# Patient Record
Sex: MALE | Race: WHITE | NOT HISPANIC OR LATINO | URBAN - METROPOLITAN AREA
[De-identification: names, ages, dates, MRNs, and addresses within clinical notes are randomized per-mention and may not be internally consistent; named-entity substitution may affect disease eponyms.]

---

## 2017-01-28 ENCOUNTER — INPATIENT (INPATIENT)
Facility: HOSPITAL | Age: 72
LOS: 5 days | Discharge: HOME | End: 2017-02-03
Payer: COMMERCIAL

## 2017-01-28 ENCOUNTER — OUTPATIENT (OUTPATIENT)
Dept: OUTPATIENT SERVICES | Facility: HOSPITAL | Age: 72
LOS: 1 days | End: 2017-01-28

## 2017-01-28 PROCEDURE — 74177 CT ABD & PELVIS W/CONTRAST: CPT | Mod: 26

## 2017-01-28 PROCEDURE — 71010: CPT | Mod: 26

## 2017-01-28 PROCEDURE — 52332 CYSTOSCOPY AND TREATMENT: CPT | Mod: LT

## 2017-01-28 PROCEDURE — 70450 CT HEAD/BRAIN W/O DYE: CPT | Mod: 26

## 2017-01-28 PROCEDURE — 71275 CT ANGIOGRAPHY CHEST: CPT | Mod: 26

## 2017-01-28 PROCEDURE — 99291 CRITICAL CARE FIRST HOUR: CPT

## 2017-01-29 ENCOUNTER — OUTPATIENT (OUTPATIENT)
Dept: OUTPATIENT SERVICES | Facility: HOSPITAL | Age: 72
LOS: 1 days | End: 2017-01-29

## 2017-01-29 PROCEDURE — 71010: CPT | Mod: 26

## 2017-01-30 ENCOUNTER — OUTPATIENT (OUTPATIENT)
Dept: OUTPATIENT SERVICES | Facility: HOSPITAL | Age: 72
LOS: 1 days | End: 2017-01-30

## 2017-01-30 PROCEDURE — 71010: CPT | Mod: 26

## 2017-01-31 PROCEDURE — 71010: CPT | Mod: 26

## 2017-02-01 ENCOUNTER — OUTPATIENT (OUTPATIENT)
Dept: OUTPATIENT SERVICES | Facility: HOSPITAL | Age: 72
LOS: 1 days | End: 2017-02-01

## 2017-02-01 PROCEDURE — 76857 US EXAM PELVIC LIMITED: CPT | Mod: 26

## 2017-02-02 ENCOUNTER — OUTPATIENT (OUTPATIENT)
Dept: OUTPATIENT SERVICES | Facility: HOSPITAL | Age: 72
LOS: 1 days | End: 2017-02-02

## 2017-02-03 ENCOUNTER — EMERGENCY (EMERGENCY)
Facility: HOSPITAL | Age: 72
LOS: 1 days | End: 2017-02-03
Payer: COMMERCIAL

## 2017-02-03 PROCEDURE — 99283 EMERGENCY DEPT VISIT LOW MDM: CPT

## 2017-02-06 ENCOUNTER — APPOINTMENT (OUTPATIENT)
Dept: UROLOGY | Facility: CLINIC | Age: 72
End: 2017-02-06

## 2017-02-06 DIAGNOSIS — N20.0 CALCULUS OF KIDNEY: ICD-10-CM

## 2017-02-07 ENCOUNTER — INPATIENT (INPATIENT)
Facility: HOSPITAL | Age: 72
LOS: 3 days | Discharge: HOME | End: 2017-02-11
Payer: COMMERCIAL

## 2017-02-07 ENCOUNTER — OUTPATIENT (OUTPATIENT)
Dept: OUTPATIENT SERVICES | Facility: HOSPITAL | Age: 72
LOS: 1 days | End: 2017-02-07

## 2017-02-07 PROCEDURE — 74176 CT ABD & PELVIS W/O CONTRAST: CPT | Mod: 26

## 2017-02-07 PROCEDURE — 99285 EMERGENCY DEPT VISIT HI MDM: CPT | Mod: 25

## 2017-02-08 ENCOUNTER — OUTPATIENT (OUTPATIENT)
Dept: OUTPATIENT SERVICES | Facility: HOSPITAL | Age: 72
LOS: 1 days | End: 2017-02-08

## 2017-02-09 ENCOUNTER — OUTPATIENT (OUTPATIENT)
Dept: OUTPATIENT SERVICES | Facility: HOSPITAL | Age: 72
LOS: 1 days | End: 2017-02-09

## 2017-02-10 ENCOUNTER — OUTPATIENT (OUTPATIENT)
Dept: OUTPATIENT SERVICES | Facility: HOSPITAL | Age: 72
LOS: 1 days | End: 2017-02-10

## 2017-02-11 ENCOUNTER — OUTPATIENT (OUTPATIENT)
Dept: OUTPATIENT SERVICES | Facility: HOSPITAL | Age: 72
LOS: 1 days | End: 2017-02-11

## 2017-02-11 PROCEDURE — 99284 EMERGENCY DEPT VISIT MOD MDM: CPT

## 2017-02-11 PROCEDURE — 71010: CPT | Mod: 26

## 2017-02-12 ENCOUNTER — OUTPATIENT (OUTPATIENT)
Dept: OUTPATIENT SERVICES | Facility: HOSPITAL | Age: 72
LOS: 1 days | End: 2017-02-12

## 2017-02-12 ENCOUNTER — INPATIENT (INPATIENT)
Facility: HOSPITAL | Age: 72
LOS: 2 days | Discharge: HOME HEALTH | End: 2017-02-15
Payer: MEDICARE

## 2017-02-12 PROCEDURE — 99285 EMERGENCY DEPT VISIT HI MDM: CPT

## 2017-02-13 ENCOUNTER — OUTPATIENT (OUTPATIENT)
Dept: OUTPATIENT SERVICES | Facility: HOSPITAL | Age: 72
LOS: 1 days | End: 2017-02-13

## 2017-02-13 PROCEDURE — 99232 SBSQ HOSP IP/OBS MODERATE 35: CPT

## 2017-02-14 ENCOUNTER — OUTPATIENT (OUTPATIENT)
Dept: OUTPATIENT SERVICES | Facility: HOSPITAL | Age: 72
LOS: 1 days | End: 2017-02-14

## 2017-02-14 PROCEDURE — 99232 SBSQ HOSP IP/OBS MODERATE 35: CPT

## 2017-02-14 PROCEDURE — 36569 INSJ PICC 5 YR+ W/O IMAGING: CPT

## 2017-02-14 PROCEDURE — 76937 US GUIDE VASCULAR ACCESS: CPT | Mod: 26

## 2017-02-15 ENCOUNTER — OUTPATIENT (OUTPATIENT)
Dept: OUTPATIENT SERVICES | Facility: HOSPITAL | Age: 72
LOS: 1 days | End: 2017-02-15

## 2017-02-15 PROCEDURE — 93306 TTE W/DOPPLER COMPLETE: CPT | Mod: 26

## 2017-02-15 PROCEDURE — 99232 SBSQ HOSP IP/OBS MODERATE 35: CPT

## 2017-02-16 ENCOUNTER — RX RENEWAL (OUTPATIENT)
Age: 72
End: 2017-02-16

## 2017-02-17 ENCOUNTER — MEDICATION RENEWAL (OUTPATIENT)
Age: 72
End: 2017-02-17

## 2017-02-24 ENCOUNTER — EMERGENCY (EMERGENCY)
Facility: HOSPITAL | Age: 72
LOS: 1 days | End: 2017-02-24
Payer: COMMERCIAL

## 2017-02-24 PROCEDURE — 99283 EMERGENCY DEPT VISIT LOW MDM: CPT

## 2017-03-06 PROCEDURE — 74176 CT ABD & PELVIS W/O CONTRAST: CPT | Mod: 26

## 2017-03-06 PROCEDURE — 99285 EMERGENCY DEPT VISIT HI MDM: CPT

## 2017-03-07 ENCOUNTER — INPATIENT (INPATIENT)
Facility: HOSPITAL | Age: 72
LOS: 0 days | Discharge: HOME | End: 2017-03-07
Payer: MEDICARE

## 2017-03-10 ENCOUNTER — APPOINTMENT (OUTPATIENT)
Dept: CARDIOLOGY | Facility: CLINIC | Age: 72
End: 2017-03-10

## 2017-03-31 ENCOUNTER — APPOINTMENT (OUTPATIENT)
Dept: UROLOGY | Facility: HOSPITAL | Age: 72
End: 2017-03-31

## 2017-03-31 ENCOUNTER — OUTPATIENT (OUTPATIENT)
Dept: OUTPATIENT SERVICES | Facility: HOSPITAL | Age: 72
LOS: 1 days | End: 2017-03-31

## 2017-04-07 ENCOUNTER — APPOINTMENT (OUTPATIENT)
Dept: CARDIOLOGY | Facility: CLINIC | Age: 72
End: 2017-04-07

## 2017-04-10 ENCOUNTER — APPOINTMENT (OUTPATIENT)
Dept: UROLOGY | Facility: CLINIC | Age: 72
End: 2017-04-10

## 2017-05-02 ENCOUNTER — APPOINTMENT (OUTPATIENT)
Dept: CARDIOLOGY | Facility: CLINIC | Age: 72
End: 2017-05-02

## 2017-05-11 ENCOUNTER — APPOINTMENT (OUTPATIENT)
Dept: CARDIOLOGY | Facility: CLINIC | Age: 72
End: 2017-05-11

## 2017-05-17 ENCOUNTER — APPOINTMENT (OUTPATIENT)
Dept: CARDIOLOGY | Facility: CLINIC | Age: 72
End: 2017-05-17

## 2017-05-25 ENCOUNTER — APPOINTMENT (OUTPATIENT)
Dept: CARDIOLOGY | Facility: CLINIC | Age: 72
End: 2017-05-25

## 2017-05-31 ENCOUNTER — APPOINTMENT (OUTPATIENT)
Dept: CARDIOLOGY | Facility: CLINIC | Age: 72
End: 2017-05-31

## 2017-10-19 ENCOUNTER — RX RENEWAL (OUTPATIENT)
Age: 72
End: 2017-10-19

## 2017-11-20 ENCOUNTER — RX RENEWAL (OUTPATIENT)
Age: 72
End: 2017-11-20

## 2018-01-17 ENCOUNTER — APPOINTMENT (OUTPATIENT)
Dept: CARDIOLOGY | Facility: CLINIC | Age: 73
End: 2018-01-17
Payer: COMMERCIAL

## 2018-01-17 VITALS
BODY MASS INDEX: 33.4 KG/M2 | HEIGHT: 73 IN | SYSTOLIC BLOOD PRESSURE: 144 MMHG | DIASTOLIC BLOOD PRESSURE: 96 MMHG | HEART RATE: 66 BPM | OXYGEN SATURATION: 100 % | WEIGHT: 252 LBS

## 2018-01-17 DIAGNOSIS — Z87.891 PERSONAL HISTORY OF NICOTINE DEPENDENCE: ICD-10-CM

## 2018-01-17 DIAGNOSIS — Z86.79 PERSONAL HISTORY OF OTHER DISEASES OF THE CIRCULATORY SYSTEM: ICD-10-CM

## 2018-01-17 DIAGNOSIS — Z86.39 PERSONAL HISTORY OF OTHER ENDOCRINE, NUTRITIONAL AND METABOLIC DISEASE: ICD-10-CM

## 2018-01-17 DIAGNOSIS — H90.5 UNSPECIFIED SENSORINEURAL HEARING LOSS: ICD-10-CM

## 2018-01-17 DIAGNOSIS — Z78.9 OTHER SPECIFIED HEALTH STATUS: ICD-10-CM

## 2018-01-17 DIAGNOSIS — Z87.898 PERSONAL HISTORY OF OTHER SPECIFIED CONDITIONS: ICD-10-CM

## 2018-01-17 PROCEDURE — 99214 OFFICE O/P EST MOD 30 MIN: CPT

## 2018-01-17 RX ORDER — FUROSEMIDE 40 MG/1
40 TABLET ORAL DAILY
Qty: 90 | Refills: 1 | Status: DISCONTINUED | COMMUNITY
Start: 2017-11-20 | End: 2018-01-17

## 2018-06-07 ENCOUNTER — RX RENEWAL (OUTPATIENT)
Age: 73
End: 2018-06-07

## 2018-07-12 ENCOUNTER — RX RENEWAL (OUTPATIENT)
Age: 73
End: 2018-07-12

## 2018-07-12 ENCOUNTER — APPOINTMENT (OUTPATIENT)
Dept: CARDIOLOGY | Facility: CLINIC | Age: 73
End: 2018-07-12
Payer: COMMERCIAL

## 2018-07-12 VITALS
DIASTOLIC BLOOD PRESSURE: 68 MMHG | HEIGHT: 73 IN | SYSTOLIC BLOOD PRESSURE: 130 MMHG | BODY MASS INDEX: 33.53 KG/M2 | WEIGHT: 253 LBS | HEART RATE: 65 BPM

## 2018-07-12 DIAGNOSIS — R60.0 LOCALIZED EDEMA: ICD-10-CM

## 2018-07-12 DIAGNOSIS — E78.00 PURE HYPERCHOLESTEROLEMIA, UNSPECIFIED: ICD-10-CM

## 2018-07-12 PROCEDURE — 99214 OFFICE O/P EST MOD 30 MIN: CPT

## 2018-07-13 PROBLEM — E78.00 PURE HYPERCHOLESTEROLEMIA: Status: ACTIVE | Noted: 2018-01-17

## 2018-07-13 PROBLEM — R60.0 EDEMA EXTREMITIES: Status: ACTIVE | Noted: 2018-01-17

## 2018-07-13 RX ORDER — FUROSEMIDE 40 MG/1
40 TABLET ORAL
Qty: 90 | Refills: 3 | Status: DISCONTINUED | COMMUNITY
Start: 2018-07-12 | End: 2018-07-13

## 2018-07-22 PROBLEM — Z78.9 ALCOHOL USE: Status: ACTIVE | Noted: 2018-01-16

## 2018-12-14 ENCOUNTER — RX RENEWAL (OUTPATIENT)
Age: 73
End: 2018-12-14

## 2019-01-28 ENCOUNTER — APPOINTMENT (OUTPATIENT)
Dept: CARDIOLOGY | Facility: CLINIC | Age: 74
End: 2019-01-28
Payer: COMMERCIAL

## 2019-01-28 ENCOUNTER — NON-APPOINTMENT (OUTPATIENT)
Age: 74
End: 2019-01-28

## 2019-01-28 VITALS
HEIGHT: 73 IN | WEIGHT: 250 LBS | BODY MASS INDEX: 33.13 KG/M2 | SYSTOLIC BLOOD PRESSURE: 140 MMHG | DIASTOLIC BLOOD PRESSURE: 80 MMHG | HEART RATE: 66 BPM

## 2019-01-28 PROCEDURE — 93000 ELECTROCARDIOGRAM COMPLETE: CPT

## 2019-01-28 PROCEDURE — 99214 OFFICE O/P EST MOD 30 MIN: CPT

## 2019-02-25 ENCOUNTER — APPOINTMENT (OUTPATIENT)
Dept: CARDIOLOGY | Facility: CLINIC | Age: 74
End: 2019-02-25
Payer: MEDICARE

## 2019-02-25 PROCEDURE — 78452 HT MUSCLE IMAGE SPECT MULT: CPT

## 2019-02-25 PROCEDURE — A9502: CPT

## 2019-02-25 PROCEDURE — 93306 TTE W/DOPPLER COMPLETE: CPT

## 2019-02-25 PROCEDURE — 93015 CV STRESS TEST SUPVJ I&R: CPT

## 2019-03-11 ENCOUNTER — APPOINTMENT (OUTPATIENT)
Dept: CARDIOLOGY | Facility: CLINIC | Age: 74
End: 2019-03-11
Payer: MEDICARE

## 2019-03-11 VITALS
WEIGHT: 240 LBS | SYSTOLIC BLOOD PRESSURE: 144 MMHG | OXYGEN SATURATION: 96 % | DIASTOLIC BLOOD PRESSURE: 74 MMHG | HEIGHT: 73 IN | BODY MASS INDEX: 31.81 KG/M2 | HEART RATE: 77 BPM

## 2019-03-11 PROCEDURE — 99214 OFFICE O/P EST MOD 30 MIN: CPT

## 2019-03-11 RX ORDER — LISINOPRIL 2.5 MG/1
2.5 TABLET ORAL
Qty: 30 | Refills: 11 | Status: DISCONTINUED | COMMUNITY
Start: 2019-01-28 | End: 2019-03-11

## 2019-03-26 ENCOUNTER — OUTPATIENT (OUTPATIENT)
Dept: OUTPATIENT SERVICES | Facility: HOSPITAL | Age: 74
LOS: 1 days | End: 2019-03-26
Payer: MEDICARE

## 2019-03-26 PROCEDURE — 93459 L HRT ART/GRFT ANGIO: CPT | Mod: 26

## 2019-03-26 PROCEDURE — 93010 ELECTROCARDIOGRAM REPORT: CPT

## 2019-03-28 ENCOUNTER — TRANSCRIPTION ENCOUNTER (OUTPATIENT)
Age: 74
End: 2019-03-28

## 2019-03-28 ENCOUNTER — APPOINTMENT (OUTPATIENT)
Dept: CARDIOLOGY | Facility: CLINIC | Age: 74
End: 2019-03-28
Payer: MEDICARE

## 2019-03-28 VITALS
DIASTOLIC BLOOD PRESSURE: 62 MMHG | WEIGHT: 240 LBS | SYSTOLIC BLOOD PRESSURE: 118 MMHG | HEART RATE: 63 BPM | BODY MASS INDEX: 31.81 KG/M2 | HEIGHT: 73 IN | OXYGEN SATURATION: 98 %

## 2019-03-28 PROCEDURE — 99214 OFFICE O/P EST MOD 30 MIN: CPT

## 2019-04-11 ENCOUNTER — OUTPATIENT (OUTPATIENT)
Dept: OUTPATIENT SERVICES | Facility: HOSPITAL | Age: 74
LOS: 1 days | Discharge: HOME | End: 2019-04-11

## 2019-06-24 ENCOUNTER — APPOINTMENT (OUTPATIENT)
Dept: CARDIOLOGY | Facility: CLINIC | Age: 74
End: 2019-06-24

## 2019-07-12 ENCOUNTER — MEDICATION RENEWAL (OUTPATIENT)
Age: 74
End: 2019-07-12

## 2019-08-12 ENCOUNTER — APPOINTMENT (OUTPATIENT)
Dept: CARDIOLOGY | Facility: CLINIC | Age: 74
End: 2019-08-12

## 2019-10-07 ENCOUNTER — FORM ENCOUNTER (OUTPATIENT)
Age: 74
End: 2019-10-07

## 2019-10-07 ENCOUNTER — APPOINTMENT (OUTPATIENT)
Dept: CARDIOLOGY | Facility: CLINIC | Age: 74
End: 2019-10-07
Payer: MEDICARE

## 2019-10-07 VITALS
DIASTOLIC BLOOD PRESSURE: 80 MMHG | HEART RATE: 64 BPM | OXYGEN SATURATION: 98 % | SYSTOLIC BLOOD PRESSURE: 138 MMHG | HEIGHT: 73 IN | BODY MASS INDEX: 32.47 KG/M2 | WEIGHT: 245 LBS

## 2019-10-07 PROCEDURE — 99214 OFFICE O/P EST MOD 30 MIN: CPT

## 2019-10-07 RX ORDER — OMEPRAZOLE 20 MG/1
20 CAPSULE, DELAYED RELEASE ORAL
Qty: 30 | Refills: 0 | Status: DISCONTINUED | COMMUNITY
Start: 2016-09-09 | End: 2019-10-07

## 2019-10-08 ENCOUNTER — APPOINTMENT (OUTPATIENT)
Dept: ULTRASOUND IMAGING | Facility: CLINIC | Age: 74
End: 2019-10-08
Payer: MEDICARE

## 2019-10-08 PROCEDURE — 93970 EXTREMITY STUDY: CPT

## 2019-11-01 ENCOUNTER — APPOINTMENT (OUTPATIENT)
Dept: RADIOLOGY | Facility: CLINIC | Age: 74
End: 2019-11-01
Payer: MEDICARE

## 2019-11-01 PROCEDURE — 71046 X-RAY EXAM CHEST 2 VIEWS: CPT

## 2019-12-06 ENCOUNTER — OUTPATIENT (OUTPATIENT)
Dept: OUTPATIENT SERVICES | Facility: HOSPITAL | Age: 74
LOS: 1 days | End: 2019-12-06

## 2019-12-06 ENCOUNTER — INPATIENT (INPATIENT)
Facility: HOSPITAL | Age: 74
LOS: 0 days | Discharge: LEFT BEFORE DISCHARGE | End: 2019-12-07
Attending: FAMILY MEDICINE | Admitting: FAMILY MEDICINE
Payer: MEDICARE

## 2019-12-06 PROCEDURE — 71046 X-RAY EXAM CHEST 2 VIEWS: CPT | Mod: 26

## 2019-12-06 PROCEDURE — 99285 EMERGENCY DEPT VISIT HI MDM: CPT

## 2019-12-06 PROCEDURE — 76770 US EXAM ABDO BACK WALL COMP: CPT | Mod: 26

## 2019-12-07 ENCOUNTER — OUTPATIENT (OUTPATIENT)
Dept: OUTPATIENT SERVICES | Facility: HOSPITAL | Age: 74
LOS: 1 days | End: 2019-12-07

## 2019-12-07 PROCEDURE — 99223 1ST HOSP IP/OBS HIGH 75: CPT

## 2020-02-28 ENCOUNTER — NON-APPOINTMENT (OUTPATIENT)
Age: 75
End: 2020-02-28

## 2020-02-28 ENCOUNTER — APPOINTMENT (OUTPATIENT)
Dept: CARDIOLOGY | Facility: CLINIC | Age: 75
End: 2020-02-28
Payer: MEDICARE

## 2020-02-28 VITALS
SYSTOLIC BLOOD PRESSURE: 120 MMHG | WEIGHT: 252 LBS | BODY MASS INDEX: 33.4 KG/M2 | OXYGEN SATURATION: 98 % | HEART RATE: 100 BPM | DIASTOLIC BLOOD PRESSURE: 68 MMHG | HEIGHT: 73 IN

## 2020-02-28 PROCEDURE — 99214 OFFICE O/P EST MOD 30 MIN: CPT

## 2020-02-28 PROCEDURE — 93000 ELECTROCARDIOGRAM COMPLETE: CPT

## 2020-03-03 ENCOUNTER — APPOINTMENT (OUTPATIENT)
Dept: CARDIOLOGY | Facility: CLINIC | Age: 75
End: 2020-03-03
Payer: MEDICARE

## 2020-03-03 PROCEDURE — 93306 TTE W/DOPPLER COMPLETE: CPT

## 2020-03-07 ENCOUNTER — APPOINTMENT (OUTPATIENT)
Dept: CT IMAGING | Facility: CLINIC | Age: 75
End: 2020-03-07
Payer: MEDICARE

## 2020-03-07 PROCEDURE — 71250 CT THORAX DX C-: CPT

## 2020-03-16 ENCOUNTER — APPOINTMENT (OUTPATIENT)
Dept: CARDIOLOGY | Facility: CLINIC | Age: 75
End: 2020-03-16

## 2020-04-17 ENCOUNTER — APPOINTMENT (OUTPATIENT)
Dept: CARDIOLOGY | Facility: CLINIC | Age: 75
End: 2020-04-17

## 2020-05-29 DIAGNOSIS — Z01.818 ENCOUNTER FOR OTHER PREPROCEDURAL EXAMINATION: ICD-10-CM

## 2020-05-30 ENCOUNTER — APPOINTMENT (OUTPATIENT)
Dept: DISASTER EMERGENCY | Facility: CLINIC | Age: 75
End: 2020-05-30

## 2020-05-31 LAB — SARS-COV-2 N GENE NPH QL NAA+PROBE: NOT DETECTED

## 2020-06-01 ENCOUNTER — OUTPATIENT (OUTPATIENT)
Dept: OUTPATIENT SERVICES | Facility: HOSPITAL | Age: 75
LOS: 1 days | End: 2020-06-01
Payer: MEDICARE

## 2020-06-01 PROCEDURE — 92960 CARDIOVERSION ELECTRIC EXT: CPT

## 2020-06-01 PROCEDURE — 93312 ECHO TRANSESOPHAGEAL: CPT | Mod: 26

## 2020-06-01 PROCEDURE — 93320 DOPPLER ECHO COMPLETE: CPT | Mod: 26

## 2020-06-03 ENCOUNTER — APPOINTMENT (OUTPATIENT)
Dept: UROLOGY | Facility: CLINIC | Age: 75
End: 2020-06-03

## 2020-06-11 ENCOUNTER — APPOINTMENT (OUTPATIENT)
Dept: UROLOGY | Facility: CLINIC | Age: 75
End: 2020-06-11
Payer: MEDICARE

## 2020-06-11 PROCEDURE — 99204 OFFICE O/P NEW MOD 45 MIN: CPT | Mod: 95

## 2020-06-11 NOTE — HISTORY OF PRESENT ILLNESS
[Home] : at home, [unfilled] , at the time of the visit. [Medical Office: ([facility])___] : at the medical office located in  [Verbal consent obtained from patient] : the patient, [unfilled] [FreeTextEntry1] : 74y/o man\par Chart reviewed, labs reviewed, latest RANDY report reviewed.\par Hx of retention after surgical procedures.\par Hx of kidney stones, ureteral stone causing sepsis in 2017.-- that stone already treated and ureteral stent removed per patient.\par BPH managed by Dr. Edouard for many year. Now on Cardura and Finasteride. \par Prostate is about 150cc as patient can recall.\par He was referred by Dr. Ye Edouard for HOLEP.\par Records requested from Dr. Edouard by me today.\par Pt has researched and read about surgery online and is ready to proceed if he is a candidate.\par He has a trip planned for 2 weeks in August 2020 and hopes to have surgery before that.\par \par \par Discussed Laser enucleation of prostate with morcellation (HOLEP/ThuLEP).\par \par Indications, options including chronic Love catheter, suprapubic catheter, intermittent self-catheterization, transurethral resection of prostate, transurethral vaporization of prostate with laser or electrocautery, open or laparoscopic enucleation of the prostate, all discussed.\par \par Potential complications of transurethral holmium or thulium laser enucleation of prostate with morcellation discussed. This included risk of infection, bleeding, transfusion, conversion to open enucleation, need for staged procedure, adjacent organ injury, bladder injury, clot retention of urine requiring return to operating room for cystoscopy clot evacuation, prolonged duration of Love catheterization, urethral stricture, transient or permanent urinary incontinence, retrograde ejaculation is a permanent and irreversible complication, redo or staged surgery due to equipment malfunction, anesthetic complications, cardiac complications, all discussed. \par \par Printed information including of the above and other more uncommon complications was mailed to patient.\par \par We'll schedule.\par \par ADDENDUM:\par Records from Dr. Edouard reviewed: \par 169cc prostate by ultrasound measurement March 2018\par PSA 1.93 (April 2020)\par

## 2020-06-12 ENCOUNTER — NON-APPOINTMENT (OUTPATIENT)
Age: 75
End: 2020-06-12

## 2020-06-12 ENCOUNTER — APPOINTMENT (OUTPATIENT)
Dept: CARDIOLOGY | Facility: CLINIC | Age: 75
End: 2020-06-12
Payer: MEDICARE

## 2020-06-12 VITALS
DIASTOLIC BLOOD PRESSURE: 76 MMHG | BODY MASS INDEX: 32.46 KG/M2 | TEMPERATURE: 98 F | WEIGHT: 246 LBS | HEART RATE: 96 BPM | OXYGEN SATURATION: 96 % | SYSTOLIC BLOOD PRESSURE: 128 MMHG

## 2020-06-12 PROCEDURE — 93000 ELECTROCARDIOGRAM COMPLETE: CPT

## 2020-06-12 PROCEDURE — 99214 OFFICE O/P EST MOD 30 MIN: CPT

## 2020-06-26 ENCOUNTER — APPOINTMENT (OUTPATIENT)
Dept: UROLOGY | Facility: CLINIC | Age: 75
End: 2020-06-26
Payer: MEDICARE

## 2020-06-26 VITALS
RESPIRATION RATE: 16 BRPM | BODY MASS INDEX: 32.6 KG/M2 | OXYGEN SATURATION: 97 % | HEIGHT: 73 IN | DIASTOLIC BLOOD PRESSURE: 88 MMHG | HEART RATE: 64 BPM | TEMPERATURE: 98.3 F | SYSTOLIC BLOOD PRESSURE: 140 MMHG | WEIGHT: 246 LBS

## 2020-06-26 DIAGNOSIS — R33.9 RETENTION OF URINE, UNSPECIFIED: ICD-10-CM

## 2020-06-26 PROCEDURE — 99214 OFFICE O/P EST MOD 30 MIN: CPT

## 2020-06-26 NOTE — HISTORY OF PRESENT ILLNESS
[FreeTextEntry1] : Pt here with wife to discuss further before proceeding with surgery.\par We discussed his concerns regarding clot retention given his prior experience when he had a stent and Love placed for sepsis.\par \par we reviewed Laser enucleation of prostate procedure, technique, expected hospital stay (overnight), possibly longer if hematuria does not subside enough and if concern for clot retention. \par We reviewed pertinent procedure images and video.\par \par All questions addressed.\par \par Pt had cardioversion on 5/29 and Cardiologist would like him to stay on Eliquis for full 30 days before interrupting. We will postpone surgery to comply with recommendation from his Cardiologist.

## 2020-06-27 ENCOUNTER — APPOINTMENT (OUTPATIENT)
Dept: DISASTER EMERGENCY | Facility: CLINIC | Age: 75
End: 2020-06-27

## 2020-07-06 DIAGNOSIS — Z01.818 ENCOUNTER FOR OTHER PREPROCEDURAL EXAMINATION: ICD-10-CM

## 2020-07-07 ENCOUNTER — APPOINTMENT (OUTPATIENT)
Dept: DISASTER EMERGENCY | Facility: CLINIC | Age: 75
End: 2020-07-07

## 2020-07-08 ENCOUNTER — TRANSCRIPTION ENCOUNTER (OUTPATIENT)
Age: 75
End: 2020-07-08

## 2020-07-08 ENCOUNTER — OUTPATIENT (OUTPATIENT)
Dept: OUTPATIENT SERVICES | Facility: HOSPITAL | Age: 75
LOS: 1 days | End: 2020-07-08

## 2020-07-08 LAB — SARS-COV-2 N GENE NPH QL NAA+PROBE: NOT DETECTED

## 2020-07-09 ENCOUNTER — INPATIENT (INPATIENT)
Facility: HOSPITAL | Age: 75
LOS: 0 days | Discharge: HOME | DRG: 714 | End: 2020-07-10
Attending: UROLOGY | Admitting: UROLOGY
Payer: MEDICARE

## 2020-07-09 ENCOUNTER — RESULT REVIEW (OUTPATIENT)
Age: 75
End: 2020-07-09

## 2020-07-09 ENCOUNTER — APPOINTMENT (OUTPATIENT)
Dept: UROLOGY | Facility: HOSPITAL | Age: 75
End: 2020-07-09

## 2020-07-09 VITALS
SYSTOLIC BLOOD PRESSURE: 122 MMHG | HEIGHT: 73 IN | OXYGEN SATURATION: 98 % | TEMPERATURE: 98 F | RESPIRATION RATE: 16 BRPM | DIASTOLIC BLOOD PRESSURE: 82 MMHG | HEART RATE: 94 BPM | WEIGHT: 242.07 LBS

## 2020-07-09 DIAGNOSIS — Z01.818 ENCOUNTER FOR OTHER PREPROCEDURAL EXAMINATION: ICD-10-CM

## 2020-07-09 DIAGNOSIS — N40.1 BENIGN PROSTATIC HYPERPLASIA WITH LOWER URINARY TRACT SYMPTOMS: ICD-10-CM

## 2020-07-09 LAB
ANION GAP SERPL CALC-SCNC: 14 MMOL/L — SIGNIFICANT CHANGE UP (ref 5–17)
BLD GP AB SCN SERPL QL: NEGATIVE — SIGNIFICANT CHANGE UP
BUN SERPL-MCNC: 16 MG/DL — SIGNIFICANT CHANGE UP (ref 7–23)
CALCIUM SERPL-MCNC: 8.3 MG/DL — LOW (ref 8.4–10.5)
CHLORIDE SERPL-SCNC: 106 MMOL/L — SIGNIFICANT CHANGE UP (ref 96–108)
CO2 SERPL-SCNC: 21 MMOL/L — LOW (ref 22–31)
CREAT SERPL-MCNC: 1.16 MG/DL — SIGNIFICANT CHANGE UP (ref 0.5–1.3)
GLUCOSE SERPL-MCNC: 118 MG/DL — HIGH (ref 70–99)
HCT VFR BLD CALC: 46.3 % — SIGNIFICANT CHANGE UP (ref 39–50)
HGB BLD-MCNC: 14.6 G/DL — SIGNIFICANT CHANGE UP (ref 13–17)
MCHC RBC-ENTMCNC: 26 PG — LOW (ref 27–34)
MCHC RBC-ENTMCNC: 31.5 GM/DL — LOW (ref 32–36)
MCV RBC AUTO: 82.4 FL — SIGNIFICANT CHANGE UP (ref 80–100)
NRBC # BLD: 0 /100 WBCS — SIGNIFICANT CHANGE UP (ref 0–0)
PLATELET # BLD AUTO: 193 K/UL — SIGNIFICANT CHANGE UP (ref 150–400)
POTASSIUM SERPL-MCNC: 5 MMOL/L — SIGNIFICANT CHANGE UP (ref 3.5–5.3)
POTASSIUM SERPL-SCNC: 5 MMOL/L — SIGNIFICANT CHANGE UP (ref 3.5–5.3)
RBC # BLD: 5.62 M/UL — SIGNIFICANT CHANGE UP (ref 4.2–5.8)
RBC # FLD: 16.5 % — HIGH (ref 10.3–14.5)
RH IG SCN BLD-IMP: POSITIVE — SIGNIFICANT CHANGE UP
RH IG SCN BLD-IMP: POSITIVE — SIGNIFICANT CHANGE UP
SODIUM SERPL-SCNC: 141 MMOL/L — SIGNIFICANT CHANGE UP (ref 135–145)
WBC # BLD: 10.74 K/UL — HIGH (ref 3.8–10.5)
WBC # FLD AUTO: 10.74 K/UL — HIGH (ref 3.8–10.5)

## 2020-07-09 PROCEDURE — 88305 TISSUE EXAM BY PATHOLOGIST: CPT | Mod: 26

## 2020-07-09 PROCEDURE — 74018 RADEX ABDOMEN 1 VIEW: CPT | Mod: 26

## 2020-07-09 RX ORDER — HYDROMORPHONE HYDROCHLORIDE 2 MG/ML
0.5 INJECTION INTRAMUSCULAR; INTRAVENOUS; SUBCUTANEOUS
Refills: 0 | Status: DISCONTINUED | OUTPATIENT
Start: 2020-07-09 | End: 2020-07-09

## 2020-07-09 RX ORDER — HYDROMORPHONE HYDROCHLORIDE 2 MG/ML
1 INJECTION INTRAMUSCULAR; INTRAVENOUS; SUBCUTANEOUS
Refills: 0 | Status: DISCONTINUED | OUTPATIENT
Start: 2020-07-09 | End: 2020-07-09

## 2020-07-09 RX ORDER — PANTOPRAZOLE SODIUM 20 MG/1
40 TABLET, DELAYED RELEASE ORAL
Refills: 0 | Status: DISCONTINUED | OUTPATIENT
Start: 2020-07-09 | End: 2020-07-10

## 2020-07-09 RX ORDER — FUROSEMIDE 40 MG
10 TABLET ORAL ONCE
Refills: 0 | Status: COMPLETED | OUTPATIENT
Start: 2020-07-10 | End: 2020-07-10

## 2020-07-09 RX ORDER — DIAZEPAM 5 MG
5 TABLET ORAL ONCE
Refills: 0 | Status: DISCONTINUED | OUTPATIENT
Start: 2020-07-09 | End: 2020-07-09

## 2020-07-09 RX ORDER — FUROSEMIDE 40 MG
10 TABLET ORAL ONCE
Refills: 0 | Status: COMPLETED | OUTPATIENT
Start: 2020-07-09 | End: 2020-07-09

## 2020-07-09 RX ORDER — ATORVASTATIN CALCIUM 80 MG/1
40 TABLET, FILM COATED ORAL AT BEDTIME
Refills: 0 | Status: DISCONTINUED | OUTPATIENT
Start: 2020-07-09 | End: 2020-07-10

## 2020-07-09 RX ORDER — SODIUM CHLORIDE 9 MG/ML
1000 INJECTION, SOLUTION INTRAVENOUS
Refills: 0 | Status: DISCONTINUED | OUTPATIENT
Start: 2020-07-09 | End: 2020-07-10

## 2020-07-09 RX ORDER — ONDANSETRON 8 MG/1
4 TABLET, FILM COATED ORAL ONCE
Refills: 0 | Status: DISCONTINUED | OUTPATIENT
Start: 2020-07-09 | End: 2020-07-09

## 2020-07-09 RX ORDER — DOXAZOSIN MESYLATE 4 MG
8 TABLET ORAL
Refills: 0 | Status: DISCONTINUED | OUTPATIENT
Start: 2020-07-09 | End: 2020-07-10

## 2020-07-09 RX ORDER — METOPROLOL TARTRATE 50 MG
25 TABLET ORAL DAILY
Refills: 0 | Status: DISCONTINUED | OUTPATIENT
Start: 2020-07-09 | End: 2020-07-10

## 2020-07-09 RX ORDER — ACETAMINOPHEN 500 MG
650 TABLET ORAL EVERY 6 HOURS
Refills: 0 | Status: DISCONTINUED | OUTPATIENT
Start: 2020-07-09 | End: 2020-07-10

## 2020-07-09 RX ORDER — SENNA PLUS 8.6 MG/1
2 TABLET ORAL AT BEDTIME
Refills: 0 | Status: DISCONTINUED | OUTPATIENT
Start: 2020-07-09 | End: 2020-07-10

## 2020-07-09 RX ORDER — AMLODIPINE BESYLATE 2.5 MG/1
5 TABLET ORAL DAILY
Refills: 0 | Status: DISCONTINUED | OUTPATIENT
Start: 2020-07-09 | End: 2020-07-10

## 2020-07-09 RX ORDER — HEPARIN SODIUM 5000 [USP'U]/ML
5000 INJECTION INTRAVENOUS; SUBCUTANEOUS EVERY 8 HOURS
Refills: 0 | Status: DISCONTINUED | OUTPATIENT
Start: 2020-07-09 | End: 2020-07-10

## 2020-07-09 RX ORDER — PIPERACILLIN AND TAZOBACTAM 4; .5 G/20ML; G/20ML
3.38 INJECTION, POWDER, LYOPHILIZED, FOR SOLUTION INTRAVENOUS EVERY 8 HOURS
Refills: 0 | Status: DISCONTINUED | OUTPATIENT
Start: 2020-07-09 | End: 2020-07-10

## 2020-07-09 RX ORDER — POLYETHYLENE GLYCOL 3350 17 G/17G
17 POWDER, FOR SOLUTION ORAL DAILY
Refills: 0 | Status: DISCONTINUED | OUTPATIENT
Start: 2020-07-09 | End: 2020-07-10

## 2020-07-09 RX ORDER — FINASTERIDE 5 MG/1
5 TABLET, FILM COATED ORAL DAILY
Refills: 0 | Status: DISCONTINUED | OUTPATIENT
Start: 2020-07-09 | End: 2020-07-10

## 2020-07-09 RX ADMIN — Medication 5 MILLIGRAM(S): at 23:03

## 2020-07-09 RX ADMIN — PIPERACILLIN AND TAZOBACTAM 25 GRAM(S): 4; .5 INJECTION, POWDER, LYOPHILIZED, FOR SOLUTION INTRAVENOUS at 21:15

## 2020-07-09 RX ADMIN — Medication 8 MILLIGRAM(S): at 17:26

## 2020-07-09 RX ADMIN — Medication 10 MILLIGRAM(S): at 16:00

## 2020-07-09 RX ADMIN — ATORVASTATIN CALCIUM 40 MILLIGRAM(S): 80 TABLET, FILM COATED ORAL at 21:15

## 2020-07-09 RX ADMIN — SENNA PLUS 2 TABLET(S): 8.6 TABLET ORAL at 21:15

## 2020-07-09 RX ADMIN — SODIUM CHLORIDE 100 MILLILITER(S): 9 INJECTION, SOLUTION INTRAVENOUS at 17:54

## 2020-07-09 RX ADMIN — PANTOPRAZOLE SODIUM 40 MILLIGRAM(S): 20 TABLET, DELAYED RELEASE ORAL at 23:03

## 2020-07-09 RX ADMIN — HEPARIN SODIUM 5000 UNIT(S): 5000 INJECTION INTRAVENOUS; SUBCUTANEOUS at 21:15

## 2020-07-09 RX ADMIN — Medication 650 MILLIGRAM(S): at 21:16

## 2020-07-09 NOTE — CHART NOTE - NSCHARTNOTEFT_GEN_A_CORE
Called by RN because pt with complaint of 8/10 pain.  Pt seen and evaluated at the bedside.  Upon entering the room, pt appeared comfortable and was on cell phone without any appearance of acute distress.  Pt states he is feeling a little crampy pain in his lower abdomen below his belly button.  On exam: mild TTP of suprapubic region, 3-way bear secured and draining clear peach urine on CBI.  Discomfort most likely 2/2 bladder spasms. Already received Tylenol which appears to have helped pt a little.   Will try 5mg of Valium PO and reassess for pain. Called by RN because pt with complaint of 8/10 pain.  Pt seen and evaluated at the bedside.  Upon entering the room, pt appeared comfortable and was on cell phone without any appearance of acute distress.  Pt states he is feeling a little crampy pain in his lower abdomen below his belly button.  On exam: mild TTP of suprapubic region, 3-way bear secured and draining clear peach urine on CBI. Flowing freely without concern for obstruction.  Discomfort most likely 2/2 bladder spasms. Already received Tylenol which appears to have helped pt a little.   Will try 5mg of Valium PO and reassess for pain.

## 2020-07-09 NOTE — PRE-ANESTHESIA EVALUATION ADULT - NSANTHPMHFT_GEN_ALL_CORE
75M hx of HTN, HLD, obesity, CAD s/p CABG in 2012, afib (previously on eliquis) s/p DCCV 6/1/20. ECHO from 6/1/20 showing EF 50%. Able to lay flat. Able to walk 2-3 flights of stairs w/o SOB.

## 2020-07-09 NOTE — PROGRESS NOTE ADULT - SUBJECTIVE AND OBJECTIVE BOX
Post op Check    Pt seen and examined without complaints. Pain is controlled. Denies SOB/CP/N/V.     Vital Signs Last 24 Hrs  T(C): 36.3 (09 Jul 2020 16:45), Max: 36.8 (09 Jul 2020 08:24)  T(F): 97.3 (09 Jul 2020 16:45), Max: 98.2 (09 Jul 2020 08:24)  HR: 103 (09 Jul 2020 17:30) (94 - 113)  BP: 132/72 (09 Jul 2020 17:30) (122/82 - 152/81)  BP(mean): 97 (09 Jul 2020 17:30) (97 - 108)  RR: 14 (09 Jul 2020 17:30) (14 - 16)  SpO2: 99% (09 Jul 2020 17:30) (96% - 100%)    I&O's Summary    CBI    Physical Exam  Gen: NAD, A&Ox3  Pulm: No respiratory distress, no subcostal retractions  CV: RRR, no JVD  Abd: Soft, NT, ND  : +bear draining clear on moderate rate CBI                           14.6   10.74 )-----------( 193      ( 09 Jul 2020 16:25 )             46.3       07-09    141  |  106  |  16  ----------------------------<  118<H>  5.0   |  21<L>  |  1.16    Ca    8.3<L>      09 Jul 2020 16:25        KUB- stent in place

## 2020-07-09 NOTE — H&P PST ADULT - NSICDXPASTMEDICALHX_GEN_ALL_CORE_FT
PAST MEDICAL HISTORY:  Afib s/p carsioversion May 2020    BPH with urinary obstruction     CAD in native artery s/p cabg    HLD (hyperlipidemia)     HTN (hypertension)     Obesity     Urolithiasis

## 2020-07-09 NOTE — PRE-ANESTHESIA EVALUATION ADULT - NSANTHPEFT_GEN_ALL_CORE
General: well appearing, appears stated age  Cardiovascular: RRR, no murmurs appreciated  Respiratory: CTA B/L

## 2020-07-09 NOTE — PROGRESS NOTE ADULT - ASSESSMENT
A/P: 75y Male s/p hoLEP and resection over right UO s/p right ureteral stent placement , on CBI  DVT prophylaxis/OOB  Incentive spirometry  CBI  clamp in am   tov in am if stays clear off CBI   Analgesia and antiemetics as needed  regular Diet  AM labs  am lasix

## 2020-07-09 NOTE — H&P PST ADULT - HISTORY OF PRESENT ILLNESS
76 yo male with bph with obstruction here fore laser enucleation of prostate.   pst done in [place] 7/7/2020  recent cardioversion with eliquis on hold

## 2020-07-09 NOTE — H&P PST ADULT - ASSESSMENT
74 yo male for laser enucleation of the prostate today   type and screen to be sent   eliquis on hold after recent cardioversion

## 2020-07-09 NOTE — BRIEF OPERATIVE NOTE - NSICDXBRIEFPROCEDURE_GEN_ALL_CORE_FT
PROCEDURES:  Cystoscopy with ablation of prostate using holmium laser 09-Jul-2020 14:51:52  Rubén Green

## 2020-07-10 ENCOUNTER — TRANSCRIPTION ENCOUNTER (OUTPATIENT)
Age: 75
End: 2020-07-10

## 2020-07-10 VITALS
OXYGEN SATURATION: 95 % | RESPIRATION RATE: 18 BRPM | HEART RATE: 79 BPM | SYSTOLIC BLOOD PRESSURE: 136 MMHG | TEMPERATURE: 98 F | DIASTOLIC BLOOD PRESSURE: 92 MMHG

## 2020-07-10 LAB
ANION GAP SERPL CALC-SCNC: 12 MMOL/L — SIGNIFICANT CHANGE UP (ref 5–17)
BUN SERPL-MCNC: 20 MG/DL — SIGNIFICANT CHANGE UP (ref 7–23)
CALCIUM SERPL-MCNC: 8.1 MG/DL — LOW (ref 8.4–10.5)
CHLORIDE SERPL-SCNC: 105 MMOL/L — SIGNIFICANT CHANGE UP (ref 96–108)
CO2 SERPL-SCNC: 22 MMOL/L — SIGNIFICANT CHANGE UP (ref 22–31)
CREAT SERPL-MCNC: 1.28 MG/DL — SIGNIFICANT CHANGE UP (ref 0.5–1.3)
GLUCOSE SERPL-MCNC: 95 MG/DL — SIGNIFICANT CHANGE UP (ref 70–99)
HCT VFR BLD CALC: 42 % — SIGNIFICANT CHANGE UP (ref 39–50)
HGB BLD-MCNC: 13.5 G/DL — SIGNIFICANT CHANGE UP (ref 13–17)
MCHC RBC-ENTMCNC: 26.1 PG — LOW (ref 27–34)
MCHC RBC-ENTMCNC: 32.1 GM/DL — SIGNIFICANT CHANGE UP (ref 32–36)
MCV RBC AUTO: 81.1 FL — SIGNIFICANT CHANGE UP (ref 80–100)
NRBC # BLD: 0 /100 WBCS — SIGNIFICANT CHANGE UP (ref 0–0)
PLATELET # BLD AUTO: 190 K/UL — SIGNIFICANT CHANGE UP (ref 150–400)
POTASSIUM SERPL-MCNC: 4.2 MMOL/L — SIGNIFICANT CHANGE UP (ref 3.5–5.3)
POTASSIUM SERPL-SCNC: 4.2 MMOL/L — SIGNIFICANT CHANGE UP (ref 3.5–5.3)
RBC # BLD: 5.18 M/UL — SIGNIFICANT CHANGE UP (ref 4.2–5.8)
RBC # FLD: 16.6 % — HIGH (ref 10.3–14.5)
SODIUM SERPL-SCNC: 139 MMOL/L — SIGNIFICANT CHANGE UP (ref 135–145)
WBC # BLD: 8.66 K/UL — SIGNIFICANT CHANGE UP (ref 3.8–10.5)
WBC # FLD AUTO: 8.66 K/UL — SIGNIFICANT CHANGE UP (ref 3.8–10.5)

## 2020-07-10 PROCEDURE — 86901 BLOOD TYPING SEROLOGIC RH(D): CPT

## 2020-07-10 PROCEDURE — 88305 TISSUE EXAM BY PATHOLOGIST: CPT

## 2020-07-10 PROCEDURE — 80048 BASIC METABOLIC PNL TOTAL CA: CPT

## 2020-07-10 PROCEDURE — 85027 COMPLETE CBC AUTOMATED: CPT

## 2020-07-10 PROCEDURE — C2625: CPT

## 2020-07-10 PROCEDURE — 86900 BLOOD TYPING SEROLOGIC ABO: CPT

## 2020-07-10 PROCEDURE — C1782: CPT

## 2020-07-10 PROCEDURE — 74018 RADEX ABDOMEN 1 VIEW: CPT

## 2020-07-10 PROCEDURE — C1769: CPT

## 2020-07-10 PROCEDURE — 86850 RBC ANTIBODY SCREEN: CPT

## 2020-07-10 PROCEDURE — C1758: CPT

## 2020-07-10 PROCEDURE — C1889: CPT

## 2020-07-10 RX ORDER — POLYETHYLENE GLYCOL 3350 17 G/17G
17 POWDER, FOR SOLUTION ORAL
Qty: 0 | Refills: 0 | DISCHARGE
Start: 2020-07-10

## 2020-07-10 RX ORDER — DOXAZOSIN MESYLATE 4 MG
1 TABLET ORAL
Qty: 0 | Refills: 0 | DISCHARGE

## 2020-07-10 RX ORDER — SENNA PLUS 8.6 MG/1
2 TABLET ORAL
Qty: 0 | Refills: 0 | DISCHARGE
Start: 2020-07-10

## 2020-07-10 RX ADMIN — AMLODIPINE BESYLATE 5 MILLIGRAM(S): 2.5 TABLET ORAL at 06:50

## 2020-07-10 RX ADMIN — Medication 25 MILLIGRAM(S): at 06:50

## 2020-07-10 RX ADMIN — Medication 10 MILLIGRAM(S): at 06:59

## 2020-07-10 RX ADMIN — FINASTERIDE 5 MILLIGRAM(S): 5 TABLET, FILM COATED ORAL at 12:18

## 2020-07-10 RX ADMIN — HEPARIN SODIUM 5000 UNIT(S): 5000 INJECTION INTRAVENOUS; SUBCUTANEOUS at 06:50

## 2020-07-10 RX ADMIN — PIPERACILLIN AND TAZOBACTAM 25 GRAM(S): 4; .5 INJECTION, POWDER, LYOPHILIZED, FOR SOLUTION INTRAVENOUS at 06:50

## 2020-07-10 RX ADMIN — Medication 8 MILLIGRAM(S): at 06:50

## 2020-07-10 RX ADMIN — POLYETHYLENE GLYCOL 3350 17 GRAM(S): 17 POWDER, FOR SOLUTION ORAL at 12:18

## 2020-07-10 RX ADMIN — PANTOPRAZOLE SODIUM 40 MILLIGRAM(S): 20 TABLET, DELAYED RELEASE ORAL at 06:58

## 2020-07-10 NOTE — PROGRESS NOTE ADULT - ASSESSMENT
A/P: 75y Male s/p hoLEP and resection over right UO s/p right ureteral stent placement, POD1  DVT prophylaxis/OOB  Incentive spirometry  TOV  PVR  regular Diet  labs  lasix   dc home today with Cipro

## 2020-07-10 NOTE — PROGRESS NOTE ADULT - SUBJECTIVE AND OBJECTIVE BOX
UROLOGY DAILY PROGRESS NOTE:     Subjective: Patient seen and examined at bedside. Had bladder spasms overnight.       Objective:  Vital signs  T(F): , Max: 99.2 (07-10-20 @ 06:11)  HR: 105 (07-10-20 @ 06:11)  BP: 142/86 (07-10-20 @ 06:11)  SpO2: 94% (07-10-20 @ 06:11)  Wt(kg): --    I&O's Summary    09 Jul 2020 07:01  -  10 Jul 2020 06:56  --------------------------------------------------------  IN: 1260 mL / OUT: 0 mL / NET: 1260 mL        Gen: NAD  Pulm: No respiratory distress, no subcostal retractions  CV: RRR, no JVD  Abd: Soft, NT, ND  : CBI off- urine [facility]     Labs:  07-09  10.74 / 46.3  /1.16                           14.6   10.74 )-----------( 193      ( 09 Jul 2020 16:25 )             46.3     07-09    141  |  106  |  16  ----------------------------<  118<H>  5.0   |  21<L>  |  1.16    Ca    8.3<L>      09 Jul 2020 16:25      Urine Cx:

## 2020-07-10 NOTE — DISCHARGE NOTE NURSING/CASE MANAGEMENT/SOCIAL WORK - NSDCPNDISPN_GEN_ALL_CORE
Safe use, storage and disposal of opioids when prescribed/Opioids not applicable/not prescribed/Side effects of pain management treatment/Activities of daily living, including home environment that might     exacerbate pain or reduce effectiveness of the pain management plan of care as well as strategies to address these issues/Education provided on the pain management plan of care

## 2020-07-10 NOTE — DISCHARGE NOTE PROVIDER - CARE PROVIDER_API CALL
Chris Morris  UROLOGY  [address] [place], [place] [address]  Phone: [phone]  Fax: [phone]  Follow Up Time:

## 2020-07-10 NOTE — DISCHARGE NOTE NURSING/CASE MANAGEMENT/SOCIAL WORK - NSDCPNINST_GEN_ALL_CORE
instructed pt on s/s of infection, importance of maintaining hydration, and to call md for f/u appt.

## 2020-07-10 NOTE — DISCHARGE NOTE PROVIDER - NSDCCPCAREPLAN_GEN_ALL_CORE_FT
PRINCIPAL DISCHARGE DIAGNOSIS  Diagnosis: BPH with urinary obstruction  Assessment and Plan of Treatment: Please follow up with Dr. Morris in 1-2 weeks.  Call [phone] to schedule/confirm your appointment.  Call or follow up sooner with fevers, chills, nausea, vomiting, increasing pain, or with other concerns.        SECONDARY DISCHARGE DIAGNOSES  Diagnosis: Atrial fibrillation  Assessment and Plan of Treatment: Can restart Eliquis on Tuesday if urine clear PRINCIPAL DISCHARGE DIAGNOSIS  Diagnosis: BPH with urinary obstruction  Assessment and Plan of Treatment: Please follow up with Dr. Morris in 1-2 weeks.  Call [phone] to schedule/confirm your appointment.  Call or follow up sooner with fevers, chills, nausea, vomiting, increasing pain, or with other concerns.  Finish course of Cipro that you have at home.         SECONDARY DISCHARGE DIAGNOSES  Diagnosis: Atrial fibrillation  Assessment and Plan of Treatment: Can restart Eliquis on Tuesday if urine clear

## 2020-07-10 NOTE — DISCHARGE NOTE NURSING/CASE MANAGEMENT/SOCIAL WORK - PATIENT PORTAL LINK FT
You can access the FollowMyHealth Patient Portal offered by Health system by registering at the following website: [email or web address]. By joining [employer]’s FollowMyHealth portal, you will also be able to view your health information using other applications (apps) compatible with our system.

## 2020-07-10 NOTE — DISCHARGE NOTE PROVIDER - HOSPITAL COURSE
Underwent HOLEP on 7/9/20.  Post op was on CBI.  POD1 urine was light pink and his bear was removed.  He passed TOV.    He was sent home on Cipro, and instructed to restart Eliquis on Tuesday if urine is clear. At the time of discharge, the patient was hemodynamically stable, was tolerating PO diet, was voiding urine and passing stool, was ambulating, and was comfortable with adequate pain control.

## 2020-07-10 NOTE — DISCHARGE NOTE PROVIDER - NSDCMRMEDTOKEN_GEN_ALL_CORE_FT
amLODIPine 5 mg oral tablet: 1 tab(s) orally once a day  atorvastatin 40 mg oral tablet: 1 tab(s) orally once a day  finasteride 5 mg oral tablet: 1 tab(s) orally once a day  metoprolol tartrate 25 mg oral tablet: 1 tab(s) orally once a day  omeprazole 20 mg oral delayed release tablet: 1 tab(s) orally once a day  polyethylene glycol 3350 oral powder for reconstitution: 17 gram(s) orally once a day  senna oral tablet: 2 tab(s) orally once a day (at bedtime) amLODIPine 5 mg oral tablet: 1 tab(s) orally once a day  atorvastatin 40 mg oral tablet: 1 tab(s) orally once a day  Cipro 500 mg oral tablet: 1 tab(s) orally every 12 hours  finasteride 5 mg oral tablet: 1 tab(s) orally once a day  metoprolol tartrate 25 mg oral tablet: 1 tab(s) orally once a day  omeprazole 20 mg oral delayed release tablet: 1 tab(s) orally once a day  polyethylene glycol 3350 oral powder for reconstitution: 17 gram(s) orally once a day  senna oral tablet: 2 tab(s) orally once a day (at bedtime)

## 2020-07-13 LAB — SURGICAL PATHOLOGY STUDY: SIGNIFICANT CHANGE UP

## 2020-07-22 ENCOUNTER — OUTPATIENT (OUTPATIENT)
Dept: OUTPATIENT SERVICES | Facility: HOSPITAL | Age: 75
LOS: 1 days | End: 2020-07-22
Payer: MEDICARE

## 2020-07-22 ENCOUNTER — APPOINTMENT (OUTPATIENT)
Dept: UROLOGY | Facility: CLINIC | Age: 75
End: 2020-07-22
Payer: MEDICARE

## 2020-07-22 VITALS
HEART RATE: 86 BPM | DIASTOLIC BLOOD PRESSURE: 95 MMHG | BODY MASS INDEX: 32.6 KG/M2 | WEIGHT: 246 LBS | SYSTOLIC BLOOD PRESSURE: 144 MMHG | RESPIRATION RATE: 17 BRPM | HEIGHT: 73 IN

## 2020-07-22 VITALS — TEMPERATURE: 97.9 F

## 2020-07-22 DIAGNOSIS — T19.9XXA FOREIGN BODY IN GENITOURINARY TRACT, PART UNSPECIFIED, INITIAL ENCOUNTER: ICD-10-CM

## 2020-07-22 PROCEDURE — 52310 CYSTOSCOPY AND TREATMENT: CPT

## 2020-07-22 PROCEDURE — 52310 CYSTOSCOPY AND TREATMENT: CPT | Mod: 58

## 2020-07-22 PROCEDURE — 99024 POSTOP FOLLOW-UP VISIT: CPT

## 2020-07-22 NOTE — HISTORY OF PRESENT ILLNESS
[FreeTextEntry1] : s/p HOLEP and RIGHT ureteral stent insertion on 7/9/20\par path = 183 grams benign\par \par PVR =0cc\par \par Cystoscopy right stent removal today\par \par Plan\par Uroflow and PVR next visit\par PSA next visit\par f/u 3 months

## 2020-07-23 DIAGNOSIS — T19.9XXA FOREIGN BODY IN GENITOURINARY TRACT, PART UNSPECIFIED, INITIAL ENCOUNTER: ICD-10-CM

## 2020-07-23 DIAGNOSIS — N40.1 BENIGN PROSTATIC HYPERPLASIA WITH LOWER URINARY TRACT SYMPTOMS: ICD-10-CM

## 2020-08-20 ENCOUNTER — LABORATORY RESULT (OUTPATIENT)
Age: 75
End: 2020-08-20

## 2020-08-21 ENCOUNTER — TRANSCRIPTION ENCOUNTER (OUTPATIENT)
Age: 75
End: 2020-08-21

## 2020-08-21 RX ORDER — APIXABAN 5 MG/1
5 TABLET, FILM COATED ORAL
Qty: 180 | Refills: 3 | Status: DISCONTINUED | COMMUNITY
Start: 2020-05-29 | End: 2020-08-21

## 2020-08-24 LAB — BACTERIA UR CULT: NORMAL

## 2020-08-31 ENCOUNTER — NON-APPOINTMENT (OUTPATIENT)
Age: 75
End: 2020-08-31

## 2020-08-31 ENCOUNTER — APPOINTMENT (OUTPATIENT)
Dept: CARDIOLOGY | Facility: CLINIC | Age: 75
End: 2020-08-31
Payer: MEDICARE

## 2020-08-31 VITALS
TEMPERATURE: 98.2 F | SYSTOLIC BLOOD PRESSURE: 134 MMHG | DIASTOLIC BLOOD PRESSURE: 84 MMHG | OXYGEN SATURATION: 96 % | WEIGHT: 242 LBS | HEART RATE: 90 BPM | BODY MASS INDEX: 32.07 KG/M2 | HEIGHT: 73 IN

## 2020-08-31 PROBLEM — N40.1 BENIGN PROSTATIC HYPERPLASIA WITH LOWER URINARY TRACT SYMPTOMS: Chronic | Status: ACTIVE | Noted: 2020-07-09

## 2020-08-31 PROBLEM — I25.10 ATHEROSCLEROTIC HEART DISEASE OF NATIVE CORONARY ARTERY WITHOUT ANGINA PECTORIS: Chronic | Status: ACTIVE | Noted: 2020-07-09

## 2020-08-31 PROBLEM — I48.91 UNSPECIFIED ATRIAL FIBRILLATION: Chronic | Status: ACTIVE | Noted: 2020-07-09

## 2020-08-31 PROBLEM — N20.9 URINARY CALCULUS, UNSPECIFIED: Chronic | Status: ACTIVE | Noted: 2020-07-09

## 2020-08-31 PROBLEM — I10 ESSENTIAL (PRIMARY) HYPERTENSION: Chronic | Status: ACTIVE | Noted: 2020-07-09

## 2020-08-31 PROBLEM — E78.5 HYPERLIPIDEMIA, UNSPECIFIED: Chronic | Status: ACTIVE | Noted: 2020-07-09

## 2020-08-31 PROBLEM — E66.9 OBESITY, UNSPECIFIED: Chronic | Status: ACTIVE | Noted: 2020-07-09

## 2020-08-31 PROCEDURE — 93000 ELECTROCARDIOGRAM COMPLETE: CPT

## 2020-08-31 PROCEDURE — 99214 OFFICE O/P EST MOD 30 MIN: CPT

## 2020-08-31 RX ORDER — DOXAZOSIN 8 MG/1
8 TABLET ORAL
Qty: 180 | Refills: 3 | Status: DISCONTINUED | COMMUNITY
Start: 2016-02-29 | End: 2020-08-31

## 2020-08-31 RX ORDER — CIPROFLOXACIN HYDROCHLORIDE 500 MG/1
500 TABLET, FILM COATED ORAL TWICE DAILY
Qty: 10 | Refills: 0 | Status: DISCONTINUED | COMMUNITY
Start: 2020-08-19 | End: 2020-08-31

## 2020-08-31 RX ORDER — CIPROFLOXACIN HYDROCHLORIDE 500 MG/1
500 TABLET, FILM COATED ORAL
Qty: 14 | Refills: 0 | Status: DISCONTINUED | COMMUNITY
Start: 2020-07-06 | End: 2020-08-31

## 2020-08-31 RX ORDER — FINASTERIDE 5 MG/1
5 TABLET, FILM COATED ORAL DAILY
Refills: 0 | Status: DISCONTINUED | COMMUNITY
Start: 2016-08-03 | End: 2020-08-31

## 2020-09-02 ENCOUNTER — APPOINTMENT (OUTPATIENT)
Dept: ELECTROPHYSIOLOGY | Facility: CLINIC | Age: 75
End: 2020-09-02
Payer: MEDICARE

## 2020-09-02 VITALS
DIASTOLIC BLOOD PRESSURE: 84 MMHG | HEIGHT: 73 IN | BODY MASS INDEX: 32.07 KG/M2 | TEMPERATURE: 97.5 F | WEIGHT: 242 LBS | HEART RATE: 85 BPM | SYSTOLIC BLOOD PRESSURE: 130 MMHG | OXYGEN SATURATION: 99 %

## 2020-09-02 PROCEDURE — 99204 OFFICE O/P NEW MOD 45 MIN: CPT

## 2020-09-02 PROCEDURE — 93000 ELECTROCARDIOGRAM COMPLETE: CPT

## 2020-09-11 LAB
APPEARANCE: ABNORMAL
BACTERIA UR CULT: NORMAL
BACTERIA: NEGATIVE
BILIRUBIN URINE: NEGATIVE
BLOOD URINE: ABNORMAL
COLOR: YELLOW
GLUCOSE QUALITATIVE U: NEGATIVE
HYALINE CASTS: 0 /LPF
KETONES URINE: NEGATIVE
LEUKOCYTE ESTERASE URINE: NEGATIVE
MICROSCOPIC-UA: NORMAL
NITRITE URINE: NEGATIVE
PH URINE: 6
PROTEIN URINE: ABNORMAL
RED BLOOD CELLS URINE: >720 /HPF
SPECIFIC GRAVITY URINE: >=1.03
SQUAMOUS EPITHELIAL CELLS: 1 /HPF
UROBILINOGEN URINE: NORMAL
WHITE BLOOD CELLS URINE: 432 /HPF

## 2020-09-20 NOTE — PHYSICAL EXAM
[General Appearance - Well Developed] : well developed [General Appearance - In No Acute Distress] : no acute distress [Normal Conjunctiva] : the conjunctiva exhibited no abnormalities [Normal Jugular Venous V Waves Present] : normal jugular venous V waves present [Heart Sounds] : normal S1 and S2 [Arterial Pulses Normal] : the arterial pulses were normal [Edema] : no peripheral edema present [Auscultation Breath Sounds / Voice Sounds] : lungs were clear to auscultation bilaterally [Abdomen Soft] : soft [Abdomen Tenderness] : non-tender [Nail Clubbing] : no clubbing of the fingernails [Cyanosis, Localized] : no localized cyanosis [] : no rash [No Venous Stasis] : no venous stasis [Impaired Insight] : insight and judgment were intact

## 2020-09-20 NOTE — HISTORY OF PRESENT ILLNESS
[FreeTextEntry1] : Patient is a 75-year-old man seen in evaluation for atrial fibrillation.  He has a prior history of hypertension treated with amlodipine.  He status post coronary artery bypass surgery in 2012.  Previous echo had showed preserved left ventricular function.  The patient presented with increased shortness of breath and dizziness with routine activities.  He was previously able to swim for laps but was get an increase in symptoms of shortness of breath.  He had an EKG done at his primary care physician office that had revealed atrial fibrillation.  He underwent RANDY cardioversion but had recurrent atrial fibrillation fairly quickly thereafter.  He was not aware whether he felt better while in sinus rhythm.  The patient was on metoprolol 50 mg/day.  In addition he was on Eliquis which was discontinued because of laser prostate procedure 8 weeks ago and he had bleeding.  Plan was to resume his Eliquis which he will do shortly.\par \par \par Echocardiogram from 3/3/2020 showed ejection fraction 45 to 50%, left atrial diameter 6.1 cm 1 left atrial volume index 45 cc/m², mild to moderate mitral regurgitation, mild concentric left ventricular hypertrophy, moderate diastolic dysfunction, no pericardial effusion and mild pulmonary hypertension.  The septal thickness was 1.2 cm.\par \par RANDY performed 6/1/2020 at time of cardioversion did not show any evidence of intracardiac clot.  His ejection fraction then was read as 50%.  The mitral regurgitation was noted as mild.\par \par Cardiac catheterization performed 3/26/2019 showed patent LIMA graft to the LAD, saphenous vein graft to the first diagonal was occluded, saphenous vein graft to the second obtuse marginal had luminal irregularities.  The graft to the PDA was patent.\par \par Nuclear stress test performed February 2019 had shown no evidence of ischemia by EKG but small to medium-sized defect in lateral wall that was reversible suggestive of ischemia.  There was an inferoapical scar ejection fraction was noted 45%.\par \par \par need test for sleep apnea test\par \par does not know if he wants an ablation\par Interested in another CV - but may need AAD\par Will get a sleep study first.\par \par Electrocardiogram from 8/31/2020 showed atrial fibrillation with right bundle branch block/left anterior hemiblock.  Possible anterior septal MI.

## 2020-09-20 NOTE — DISCUSSION/SUMMARY
[FreeTextEntry1] : Patient has prior coronary disease and status post coronary artery bypass surgery.  By echo his ejection fraction is 45 to 50%.  His left atrium diameter is 6.1 cm but volume index 45 cc/m².  He has mild mitral regurgitation.  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  He has persistent atrial fibrillation with symptoms of shortness of breath.  The patient would benefit from restoration of sinus rhythm especially given his symptoms, increased ventricular response and mild cardiomyopathy.  He had a relapse soon after the cardioversion.\par \par Options for maintenance of sinus rhythm discussed with the patient.  This would include antiarrhythmic agent versus catheter ablation procedure.  Because of his underlying conduction disease we would favor catheter ablation procedure. The AF ablation procedure, risk, benefits and alternatives fully discussed. Success rate, recurrence rate, redo rates all discussed\par Complications including perforation, vascular/valvular injuries, lung injury, esophageal injury, bleeding requiring transfusion, mortality discussed. \par He does not want ablation procedure this time but will consider the options presented.  We also discussed lifestyle modification and recommended he get a sleep study.  Patient was instructed to see me in follow-up after the sleep study.  In the meantime I recommended he continue on anticoagulation and rate control with higher doses of beta-blocker if needed.

## 2020-09-20 NOTE — REVIEW OF SYSTEMS
[Feeling Fatigued] : feeling fatigued [see HPI] : see HPI [Urinary Frequency] : urinary frequency [Fever] : no fever [Recent Weight Gain (___ Lbs)] : no recent weight gain [Blurry Vision] : no blurred vision [Sore Throat] : no sore throat [Cough] : no cough [Abdominal Pain] : no abdominal pain [Skin: A Rash] : no rash: [Dizziness] : no dizziness [Anxiety] : no anxiety [Easy Bruising] : no tendency for easy bruising

## 2020-10-03 ENCOUNTER — OUTPATIENT (OUTPATIENT)
Dept: OUTPATIENT SERVICES | Facility: HOSPITAL | Age: 75
LOS: 1 days | End: 2020-10-03
Payer: MEDICARE

## 2020-10-03 DIAGNOSIS — G47.33 OBSTRUCTIVE SLEEP APNEA (ADULT) (PEDIATRIC): ICD-10-CM

## 2020-10-03 PROCEDURE — 95806 SLEEP STUDY UNATT&RESP EFFT: CPT | Mod: 26

## 2020-10-03 PROCEDURE — 95806 SLEEP STUDY UNATT&RESP EFFT: CPT

## 2020-10-03 PROCEDURE — G0399: CPT

## 2020-10-14 ENCOUNTER — APPOINTMENT (OUTPATIENT)
Dept: UROLOGY | Facility: CLINIC | Age: 75
End: 2020-10-14
Payer: MEDICARE

## 2020-10-14 VITALS
BODY MASS INDEX: 32.07 KG/M2 | WEIGHT: 242 LBS | SYSTOLIC BLOOD PRESSURE: 141 MMHG | HEIGHT: 73 IN | RESPIRATION RATE: 17 BRPM | DIASTOLIC BLOOD PRESSURE: 95 MMHG | HEART RATE: 82 BPM

## 2020-10-14 VITALS — TEMPERATURE: 97.9 F

## 2020-10-14 DIAGNOSIS — N13.8 BENIGN PROSTATIC HYPERPLASIA WITH LOWER URINARY TRACT SYMPMS: ICD-10-CM

## 2020-10-14 DIAGNOSIS — N40.1 BENIGN PROSTATIC HYPERPLASIA WITH LOWER URINARY TRACT SYMPMS: ICD-10-CM

## 2020-10-14 DIAGNOSIS — R31.0 GROSS HEMATURIA: ICD-10-CM

## 2020-10-14 DIAGNOSIS — R35.0 FREQUENCY OF MICTURITION: ICD-10-CM

## 2020-10-14 PROCEDURE — 99214 OFFICE O/P EST MOD 30 MIN: CPT

## 2020-10-14 NOTE — PHYSICAL EXAM
[General Appearance - Well Developed] : well developed [Abdomen Soft] : soft [Skin Color & Pigmentation] : normal skin color and pigmentation [Heart Rate And Rhythm] : Heart rate and rhythm were normal [] : no respiratory distress [Oriented To Time, Place, And Person] : oriented to person, place, and time [Normal Station and Gait] : the gait and station were normal for the patient's age [No Focal Deficits] : no focal deficits [Urinary Bladder Findings] : the bladder was normal on palpation

## 2020-10-15 PROBLEM — R31.0 GROSS HEMATURIA: Status: ACTIVE | Noted: 2020-08-14

## 2020-10-15 LAB — PSA SERPL-MCNC: 0.15 NG/ML

## 2020-10-15 NOTE — ASSESSMENT
[FreeTextEntry1] : PVR is 39 ml\par \par Plan\par 1) restart Finasteride 5 mg for xarelto associated hematuria\par 2) PSA today, urine culture today\par 3) increase oxybutynin from 10 mg to 15mg \par 3) RTC in 6 months with Uroflow and PVR

## 2020-10-15 NOTE — HISTORY OF PRESENT ILLNESS
[Nocturia] : nocturia [Hematuria - Gross] : gross hematuria [None] : None [Dysuria] : no dysuria [FreeTextEntry1] : 75 yr old male presents to follow up for BPH. \par Good strong urine flow since surgery.\par Pt complains of urgency, taking oxybutynin 10 mg ER, feels like it is helping. \par Pt doing kegel exercises, but not always compliant.\par Nocturia x 3. \par Pt stopped Xarelto, urine was clear for 2-3 weeks, restarted Xarelto and urine became bloody within 30 mins. He has since discontinued the Xarelto for now.\par \par HOLEP and RIGHT ureteral stent insertion on 7/9/20\par path = 183 grams benign \par Right ureteral stent removed on 7/22/20

## 2020-10-16 LAB — BACTERIA UR CULT: NORMAL

## 2020-11-19 ENCOUNTER — APPOINTMENT (OUTPATIENT)
Dept: PULMONOLOGY | Facility: CLINIC | Age: 75
End: 2020-11-19
Payer: MEDICARE

## 2020-11-19 VITALS
SYSTOLIC BLOOD PRESSURE: 140 MMHG | DIASTOLIC BLOOD PRESSURE: 88 MMHG | OXYGEN SATURATION: 97 % | HEART RATE: 158 BPM | HEIGHT: 73 IN | WEIGHT: 240 LBS | BODY MASS INDEX: 31.81 KG/M2 | RESPIRATION RATE: 16 BRPM

## 2020-11-19 PROCEDURE — 99204 OFFICE O/P NEW MOD 45 MIN: CPT

## 2020-11-19 NOTE — PHYSICAL EXAM
[No Acute Distress] : no acute distress [Elongated Uvula] : elongated uvula [Enlarged Base of the Tongue] : enlarged base of the tongue [II] : Mallampati Class: II [Normal Appearance] : normal appearance [Neck Circumference: ___] : neck circumference: [unfilled] [No Neck Mass] : no neck mass [Normal Rate/Rhythm] : normal rate/rhythm [Normal S1, S2] : normal s1, s2 [No Murmurs] : no murmurs [No Resp Distress] : no resp distress [Clear to Auscultation Bilaterally] : clear to auscultation bilaterally [No Abnormalities] : no abnormalities [Benign] : benign [Normal Gait] : normal gait [No Clubbing] : no clubbing [No Cyanosis] : no cyanosis [No Edema] : no edema [Normal Color/ Pigmentation] : normal color/ pigmentation [No Focal Deficits] : no focal deficits [Oriented x3] : oriented x3 [Normal Affect] : normal affect [TextBox_2] : obese

## 2020-11-19 NOTE — CONSULT LETTER
[Dear  ___] : Dear  [unfilled], [Consult Letter:] : I had the pleasure of evaluating your patient, [unfilled]. [Please see my note below.] : Please see my note below. [Consult Closing:] : Thank you very much for allowing me to participate in the care of this patient.  If you have any questions, please do not hesitate to contact me. [Sincerely,] : Sincerely, [DrRema  ___] : Dr. LARRY

## 2020-11-19 NOTE — HISTORY OF PRESENT ILLNESS
[TextBox_4] : Here to review home sleep test ordered by Dr Wakefield (electrophysiology)\par Afib post failed DCCV on Xarelto and considering ablation\par Sleep onset insomnia - reads from 10-12 then falls asleep rapidly\par Nocturia resolved post procedure for BPH [Daytime Somnolence] : daytime somnolence [Nonrestorative Sleep] : nonrestorative sleep [Snoring] : snoring [Tired while Driving] : tired while driving [TextBox_77] : 12 [TextBox_79] : 7 [TextBox_81] : 5 [TextBox_89] : 1 [ESS] : 12

## 2020-11-19 NOTE — ASSESSMENT
[FreeTextEntry1] : Obesity \par Atrial fibrillation - failed DCCV\par At least moderate ZAY - likely severe given poor sleep the night of the Home Sleep Test

## 2020-11-23 ENCOUNTER — RX RENEWAL (OUTPATIENT)
Age: 75
End: 2020-11-23

## 2020-12-04 ENCOUNTER — NON-APPOINTMENT (OUTPATIENT)
Age: 75
End: 2020-12-04

## 2021-01-11 ENCOUNTER — APPOINTMENT (OUTPATIENT)
Dept: CARDIOLOGY | Facility: CLINIC | Age: 76
End: 2021-01-11
Payer: MEDICARE

## 2021-01-11 ENCOUNTER — NON-APPOINTMENT (OUTPATIENT)
Age: 76
End: 2021-01-11

## 2021-01-11 VITALS
OXYGEN SATURATION: 98 % | TEMPERATURE: 97.3 F | DIASTOLIC BLOOD PRESSURE: 80 MMHG | HEART RATE: 79 BPM | HEIGHT: 73 IN | WEIGHT: 235 LBS | SYSTOLIC BLOOD PRESSURE: 136 MMHG | BODY MASS INDEX: 31.14 KG/M2

## 2021-01-11 PROCEDURE — 99214 OFFICE O/P EST MOD 30 MIN: CPT

## 2021-01-11 PROCEDURE — 93000 ELECTROCARDIOGRAM COMPLETE: CPT

## 2021-03-17 ENCOUNTER — NON-APPOINTMENT (OUTPATIENT)
Age: 76
End: 2021-03-17

## 2021-03-17 ENCOUNTER — APPOINTMENT (OUTPATIENT)
Dept: ELECTROPHYSIOLOGY | Facility: CLINIC | Age: 76
End: 2021-03-17
Payer: MEDICARE

## 2021-03-17 VITALS
OXYGEN SATURATION: 98 % | SYSTOLIC BLOOD PRESSURE: 130 MMHG | HEART RATE: 76 BPM | DIASTOLIC BLOOD PRESSURE: 80 MMHG | BODY MASS INDEX: 31.54 KG/M2 | TEMPERATURE: 97.3 F | HEIGHT: 73 IN | WEIGHT: 238 LBS

## 2021-03-17 DIAGNOSIS — E66.9 OBESITY, UNSPECIFIED: ICD-10-CM

## 2021-03-17 PROCEDURE — 99213 OFFICE O/P EST LOW 20 MIN: CPT

## 2021-03-17 PROCEDURE — 93000 ELECTROCARDIOGRAM COMPLETE: CPT

## 2021-03-17 RX ORDER — ASPIRIN ENTERIC COATED TABLETS 81 MG 81 MG/1
81 TABLET, DELAYED RELEASE ORAL DAILY
Refills: 3 | Status: DISCONTINUED | COMMUNITY
End: 2021-03-17

## 2021-03-17 NOTE — PHYSICAL EXAM
[General Appearance - Well Developed] : well developed [General Appearance - In No Acute Distress] : no acute distress [Normal Conjunctiva] : the conjunctiva exhibited no abnormalities [Normal Jugular Venous V Waves Present] : normal jugular venous V waves present [Auscultation Breath Sounds / Voice Sounds] : lungs were clear to auscultation bilaterally [Heart Sounds] : normal S1 and S2 [Arterial Pulses Normal] : the arterial pulses were normal [Edema] : no peripheral edema present [Abdomen Soft] : soft [Abdomen Tenderness] : non-tender [Nail Clubbing] : no clubbing of the fingernails [Cyanosis, Localized] : no localized cyanosis [] : no rash [No Venous Stasis] : no venous stasis [Impaired Insight] : insight and judgment were intact

## 2021-03-28 PROBLEM — E66.9 OBESITY (BMI 30-39.9): Status: ACTIVE | Noted: 2020-11-19

## 2021-03-28 NOTE — HISTORY OF PRESENT ILLNESS
[FreeTextEntry1] : Follow-up evaluation for 76-year-old man who is persistent atrial fibrillation.\par \par Because of his symptoms and mild myopathy it was recommended on his last visit to consider rhythm control option.  Catheter ablation was discussed with the patient did not want procedures done.\par \par Since then has had a sleep study that was abnormal and CPAP was recommended.\par \par Other history noted for  hypertension treated with amlodipine.  He status post coronary artery bypass surgery in 2012.  Previous echo had showed preserved left ventricular function. \par \par Symptoms: Patient had initially presented with increased shortness of breath and dizziness with routine activities.  He was previously able to swim for laps but was get an increase in symptoms of shortness of breath.  He had an EKG done at his primary care physician office that had revealed atrial fibrillation.  He underwent RANDY cardioversion but had recurrent atrial fibrillation fairly quickly thereafter.  He was not aware whether he felt better while in sinus rhythm.  The patient was on metoprolol 50 mg/day.  In addition he was on Eliquis which was discontinued because of laser prostate procedure 8 weeks ago and he had bleeding.  Eliquis was resumed later.\par \par \par Echocardiogram from 3/3/2020 showed ejection fraction 45 to 50%, left atrial diameter 6.1 cm 1 left atrial volume index 45 cc/m², mild to moderate mitral regurgitation, mild concentric left ventricular hypertrophy, moderate diastolic dysfunction, no pericardial effusion and mild pulmonary hypertension.  The septal thickness was 1.2 cm.\par \par RANDY performed 6/1/2020 at time of cardioversion did not show any evidence of intracardiac clot.  His ejection fraction then was read as 50%.  The mitral regurgitation was noted as mild.\par \par Cardiac catheterization performed 3/26/2019 showed patent LIMA graft to the LAD, saphenous vein graft to the first diagonal was occluded, saphenous vein graft to the second obtuse marginal had luminal irregularities.  The graft to the PDA was patent.\par \par Nuclear stress test performed February 2019 had shown no evidence of ischemia by EKG but small to medium-sized defect in lateral wall that was reversible suggestive of ischemia.  There was an inferoapical scar ejection fraction was noted 45%.\par \par Electrocardiogram ordered to assess rhythm, intervals, QT interval.  It atrial fibrillation with right bundle branch block/left anterior hemiblock.

## 2021-03-28 NOTE — REVIEW OF SYSTEMS
[Feeling Fatigued] : feeling fatigued [see HPI] : see HPI [Fever] : no fever [Recent Weight Gain (___ Lbs)] : no recent weight gain [Blurry Vision] : no blurred vision [Sore Throat] : no sore throat [Cough] : no cough [Abdominal Pain] : no abdominal pain [Urinary Frequency] : no change in urinary frequency [Skin: A Rash] : no rash: [Dizziness] : no dizziness [Anxiety] : no anxiety [Easy Bruising] : no tendency for easy bruising

## 2021-03-28 NOTE — DISCUSSION/SUMMARY
[FreeTextEntry1] : \par Push and record symptoms\par Need echo to reassess LA size. \par \par Patient has prior coronary disease and status post coronary artery bypass surgery.  By echo his ejection fraction is 45 to 50%.  His left atrium diameter is 6.1 cm but volume index 45 cc/m².  He has mild mitral regurgitation.  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  He has persistent atrial fibrillation with symptoms of shortness of breath.  The patient would benefit from restoration of sinus rhythm especially given his symptoms, increased ventricular response and mild cardiomyopathy.  He had a relapse soon after the cardioversion.\par \par Options for maintenance of sinus rhythm discussed with the patient.  This would include antiarrhythmic agent versus catheter ablation procedure.  Because of his underlying conduction disease we would favor catheter ablation procedure. The AF ablation procedure, risk, benefits and alternatives re discussed. Success rate, recurrence rate, redo rates all discussed.\par Complications including perforation, vascular/valvular injuries, lung injury, esophageal injury, bleeding requiring transfusion, mortality discussed.\par \par He is considering the options and will reassess himself by indulging in more exercise and  see how he feels.  If he is very symptomatic the patient would then consider ablation.  He  will contact  us  thereafter if he develops symptoms during exercise.

## 2021-03-29 ENCOUNTER — RX RENEWAL (OUTPATIENT)
Age: 76
End: 2021-03-29

## 2021-03-31 ENCOUNTER — RX RENEWAL (OUTPATIENT)
Age: 76
End: 2021-03-31

## 2021-04-14 ENCOUNTER — APPOINTMENT (OUTPATIENT)
Dept: UROLOGY | Facility: CLINIC | Age: 76
End: 2021-04-14

## 2021-05-03 ENCOUNTER — APPOINTMENT (OUTPATIENT)
Dept: ELECTROPHYSIOLOGY | Facility: CLINIC | Age: 76
End: 2021-05-03

## 2021-05-03 ENCOUNTER — APPOINTMENT (OUTPATIENT)
Dept: CARDIOLOGY | Facility: CLINIC | Age: 76
End: 2021-05-03

## 2021-06-23 ENCOUNTER — APPOINTMENT (OUTPATIENT)
Dept: CARDIOLOGY | Facility: CLINIC | Age: 76
End: 2021-06-23
Payer: MEDICARE

## 2021-06-23 ENCOUNTER — NON-APPOINTMENT (OUTPATIENT)
Age: 76
End: 2021-06-23

## 2021-06-23 ENCOUNTER — APPOINTMENT (OUTPATIENT)
Dept: ELECTROPHYSIOLOGY | Facility: CLINIC | Age: 76
End: 2021-06-23
Payer: MEDICARE

## 2021-06-23 VITALS
HEART RATE: 86 BPM | SYSTOLIC BLOOD PRESSURE: 138 MMHG | HEIGHT: 73 IN | BODY MASS INDEX: 32.6 KG/M2 | DIASTOLIC BLOOD PRESSURE: 90 MMHG | WEIGHT: 246 LBS | TEMPERATURE: 97.3 F | OXYGEN SATURATION: 98 %

## 2021-06-23 PROCEDURE — 93306 TTE W/DOPPLER COMPLETE: CPT

## 2021-06-23 PROCEDURE — 99214 OFFICE O/P EST MOD 30 MIN: CPT

## 2021-06-23 PROCEDURE — 93000 ELECTROCARDIOGRAM COMPLETE: CPT

## 2021-06-23 RX ORDER — OXYBUTYNIN CHLORIDE 15 MG/1
15 TABLET, EXTENDED RELEASE ORAL
Qty: 90 | Refills: 2 | Status: DISCONTINUED | COMMUNITY
Start: 2020-09-11 | End: 2021-06-23

## 2021-06-23 NOTE — REVIEW OF SYSTEMS
[Feeling Fatigued] : not feeling fatigued [Blurry Vision] : no blurred vision [Sore Throat] : no sore throat [SOB] : no shortness of breath [Dyspnea on exertion] : not dyspnea during exertion [Cough] : no cough [Abdominal Pain] : no abdominal pain [Rash] : no rash [Dizziness] : no dizziness [Easy Bleeding] : no tendency for easy bleeding

## 2021-06-23 NOTE — DISCUSSION/SUMMARY
[FreeTextEntry1] : \par The patient has asymptomatic atrial fibrillation.  He is reluctant to undergo catheter ablation procedure because he is feeling well.  He has some baseline conduction disease with a right bundle branch block.  His repeat echocardiogram today shows EF 45 to 50%.  There was some improvement in his left atrial diameter.  We rediscussed ablation option but again because he is feeling well he would prefer not to have it done.  \par The plan is the patient will push himself over the next 3 months and return for follow-up discussion regarding whether he wants to have an ablation or antiarrhythmic and RANDY cardioversion.\par \par In the meantime we will continue him on his current medications which include metoprolol as well as Xarelto.

## 2021-06-23 NOTE — CARDIOLOGY SUMMARY
[de-identified] : Atrial fibrillation \par -Right bundle branch block with left axis -bifascicular block. \par  -Old anteroseptal infarct.

## 2021-06-23 NOTE — HISTORY OF PRESENT ILLNESS
[FreeTextEntry1] : Follow-up evaluation for 76-year-old man who is persistent atrial fibrillation.\par \par He has been feeling well and denies any symptoms.  Patient is very active and is not aware of the A. fib.\par \par He had an echocardiogram today that showed EF 45 to 50%, left atrial diameter 5.3 cm, mild mitral regurgitation, mild aortic stenosis.  Left atrial volume index was 48 cc/m².  There is borderline elevation in pulmonary pressure.  No pericardial effusion.  Compared to the echo from March 2020 there are no significant changes. Catheter ablation was previously discussed with the patient did not want procedures done.\par \par He had an sleep study that was abnormal and CPAP was recommended.\par \par Other history noted for  hypertension treated with amlodipine.  He status post coronary artery bypass surgery in 2012.  Previous echo had showed preserved left ventricular function. \par \par Symptoms: Patient currently has no symptoms.  Previously: Patient had initially presented with increased shortness of breath and dizziness with routine activities.  He was previously able to swim for laps but was get an increase in symptoms of shortness of breath.  He had an EKG done at his primary care physician office that had revealed atrial fibrillation.  He underwent RANDY cardioversion but had recurrent atrial fibrillation fairly quickly thereafter.  He was not aware whether he felt better while in sinus rhythm.  The patient was on metoprolol 50 mg/day.  In addition he was on Eliquis which was discontinued because of laser prostate procedure 8 weeks ago and he had bleeding.  Eliquis was resumed later.\par \par \par Echocardiogram from 3/3/2020 showed ejection fraction 45 to 50%, left atrial diameter 6.1 cm 1 left atrial volume index 45 cc/m², mild to moderate mitral regurgitation, mild concentric left ventricular hypertrophy, moderate diastolic dysfunction, no pericardial effusion and mild pulmonary hypertension.  The septal thickness was 1.2 cm.\par \par RANDY performed 6/1/2020 at time of cardioversion did not show any evidence of intracardiac clot.  His ejection fraction then was read as 50%.  The mitral regurgitation was noted as mild.\par \par Cardiac catheterization performed 3/26/2019 showed patent LIMA graft to the LAD, saphenous vein graft to the first diagonal was occluded, saphenous vein graft to the second obtuse marginal had luminal irregularities.  The graft to the PDA was patent.\par \par Nuclear stress test performed February 2019 had shown no evidence of ischemia by EKG but small to medium-sized defect in lateral wall that was reversible suggestive of ischemia.  There was an inferoapical scar ejection fraction was noted 45%.\par \par Electrocardiogram ordered to assess rhythm, intervals, QT interval.  It atrial fibrillation with right bundle branch block/left anterior hemiblock.

## 2021-06-25 ENCOUNTER — RX RENEWAL (OUTPATIENT)
Age: 76
End: 2021-06-25

## 2021-07-12 ENCOUNTER — APPOINTMENT (OUTPATIENT)
Dept: CARDIOLOGY | Facility: CLINIC | Age: 76
End: 2021-07-12

## 2021-10-04 ENCOUNTER — RX RENEWAL (OUTPATIENT)
Age: 76
End: 2021-10-04

## 2021-11-02 ENCOUNTER — NON-APPOINTMENT (OUTPATIENT)
Age: 76
End: 2021-11-02

## 2021-11-02 ENCOUNTER — APPOINTMENT (OUTPATIENT)
Dept: CARDIOLOGY | Facility: CLINIC | Age: 76
End: 2021-11-02
Payer: MEDICARE

## 2021-11-02 VITALS
HEART RATE: 91 BPM | TEMPERATURE: 97 F | DIASTOLIC BLOOD PRESSURE: 86 MMHG | WEIGHT: 250 LBS | HEIGHT: 73 IN | OXYGEN SATURATION: 96 % | BODY MASS INDEX: 33.13 KG/M2 | SYSTOLIC BLOOD PRESSURE: 124 MMHG

## 2021-11-02 PROCEDURE — 93000 ELECTROCARDIOGRAM COMPLETE: CPT

## 2021-11-02 PROCEDURE — 99214 OFFICE O/P EST MOD 30 MIN: CPT

## 2021-11-02 RX ORDER — OMEPRAZOLE 40 MG/1
40 CAPSULE, DELAYED RELEASE ORAL TWICE DAILY
Refills: 0 | Status: DISCONTINUED | COMMUNITY
End: 2021-11-02

## 2021-11-02 RX ORDER — FINASTERIDE 5 MG/1
5 TABLET, FILM COATED ORAL
Qty: 90 | Refills: 3 | Status: DISCONTINUED | COMMUNITY
Start: 2020-10-14 | End: 2021-11-02

## 2021-11-02 NOTE — CARDIOLOGY SUMMARY
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 [de-identified] : 6/23/2021: Ejection fraction 45 to 50%, mild MR, mild aortic stenosis, moderately dilated left atrium, mild global LV systolic dysfunction, mild concentric LVH, mild right atrial enlargement, minimal TR, borderline elevated PASP at 37 mmHg. [de-identified] : 3/26/2019: LIMA to LAD patent, SVG to RPDA and SVG to OM 2 patent.  SVG to D1 likely occluded

## 2021-11-02 NOTE — HISTORY OF PRESENT ILLNESS
[FreeTextEntry1] : 76M w/ PMH of HTN, HLD, obesity, CAD s/p CABG in 2012 (LIMA to LAD, SVG to OM, Diag, RPDA) presents for routine follow up.  Has noted increased SOB and PAULA with regular activities.  Is able to swim about 4 laps before having to stop.  However, when walking upstairs or for prolonged periods he gets PAULA.  Denies chest pain and palpitations.  Reports chronic right lower extremity edema since the CABG.\par \par He had a recent EKG done at his primary care provider's office which revealed atrial fibrillation in addition to stable conduction disease.  He continues to experience dyspnea on mild exertion.  He denies anginal chest pains or lower extremity edema. \par \par 11/2/2021:\par Since our last visit he has had relatively stable symptoms of shortness of breath/dyspnea on exertion.  He had seen Dr. Wakefield of electrophysiology and was offered catheter ablation versus continued observation.  Given the absence of progression of symptoms, he opted for conservative management with blood thinning, rate control and no catheter ablation.  He denies anginal chest pains.  He uses Lasix about twice per week.

## 2021-11-02 NOTE — PHYSICAL EXAM
[Well Developed] : well developed [Well Nourished] : well nourished [No Acute Distress] : no acute distress [Normal Conjunctiva] : normal conjunctiva [Normal Venous Pressure] : normal venous pressure [No Carotid Bruit] : no carotid bruit [Normal S1, S2] : normal S1, S2 [No Rub] : no rub [No Gallop] : no gallop [Clear Lung Fields] : clear lung fields [Good Air Entry] : good air entry [No Respiratory Distress] : no respiratory distress  [Soft] : abdomen soft [Non Tender] : non-tender [No Masses/organomegaly] : no masses/organomegaly [Normal Bowel Sounds] : normal bowel sounds [Normal Gait] : normal gait [No Edema] : no edema [No Cyanosis] : no cyanosis [No Clubbing] : no clubbing [No Varicosities] : no varicosities [No Rash] : no rash [No Skin Lesions] : no skin lesions [Moves all extremities] : moves all extremities [No Focal Deficits] : no focal deficits [Normal Speech] : normal speech [Alert and Oriented] : alert and oriented [Normal memory] : normal memory [de-identified] : 1/6 systolic murmur over the second right intercostal space. [de-identified] : Irregularly irregular

## 2021-11-02 NOTE — DISCUSSION/SUMMARY
[Patient] : the patient [With Me] : with me [___ Month(s)] : in [unfilled] month(s) [FreeTextEntry1] : 76M w/ PMH as above presenting for routine follow up.  In persistent atrial fibrillation, but with overall stable symptoms.\par \par 1. CAD - continue ASA 81 mg PO daily, lipitor 40 mg PO QHS, no myalgias despite mildly elevated CPK\par 2. AF - back in AF, feeling well.  Continue Xarelto 20 mg PO daily - EP follow-up as previously planned.\par 3. HTN - well controlled, continue toprol 50 mg PO daily.  Given LV systolic dysfunction, will switch amlodipine to losartan 25 mg p.o. daily.  I will see him back in 3 months for up titration of his medications.  His creatinine on last lab check was within normal range.\par \par RTC 3 months

## 2022-01-18 ENCOUNTER — NON-APPOINTMENT (OUTPATIENT)
Age: 77
End: 2022-01-18

## 2022-01-18 ENCOUNTER — APPOINTMENT (OUTPATIENT)
Dept: CARDIOLOGY | Facility: CLINIC | Age: 77
End: 2022-01-18
Payer: MEDICARE

## 2022-01-18 VITALS
OXYGEN SATURATION: 96 % | HEART RATE: 65 BPM | DIASTOLIC BLOOD PRESSURE: 98 MMHG | WEIGHT: 240 LBS | BODY MASS INDEX: 31.81 KG/M2 | TEMPERATURE: 97.7 F | HEIGHT: 73 IN | SYSTOLIC BLOOD PRESSURE: 156 MMHG

## 2022-01-18 PROCEDURE — 93306 TTE W/DOPPLER COMPLETE: CPT

## 2022-01-18 PROCEDURE — 99215 OFFICE O/P EST HI 40 MIN: CPT

## 2022-01-18 PROCEDURE — 93000 ELECTROCARDIOGRAM COMPLETE: CPT

## 2022-01-18 RX ORDER — OXYBUTYNIN CHLORIDE 10 MG/1
10 TABLET, EXTENDED RELEASE ORAL
Qty: 30 | Refills: 0 | Status: DISCONTINUED | COMMUNITY
Start: 2020-09-11 | End: 2022-01-18

## 2022-01-18 RX ORDER — AMLODIPINE BESYLATE 5 MG/1
5 TABLET ORAL DAILY
Qty: 90 | Refills: 3 | Status: DISCONTINUED | COMMUNITY
End: 2022-01-18

## 2022-01-18 RX ORDER — PANTOPRAZOLE 20 MG/1
20 TABLET, DELAYED RELEASE ORAL DAILY
Qty: 90 | Refills: 3 | Status: DISCONTINUED | COMMUNITY
End: 2022-01-18

## 2022-02-02 ENCOUNTER — APPOINTMENT (OUTPATIENT)
Dept: ELECTROPHYSIOLOGY | Facility: CLINIC | Age: 77
End: 2022-02-02
Payer: MEDICARE

## 2022-02-02 VITALS
HEIGHT: 73 IN | TEMPERATURE: 97.3 F | OXYGEN SATURATION: 100 % | DIASTOLIC BLOOD PRESSURE: 90 MMHG | BODY MASS INDEX: 31.81 KG/M2 | HEART RATE: 81 BPM | WEIGHT: 240 LBS | SYSTOLIC BLOOD PRESSURE: 146 MMHG

## 2022-02-02 PROCEDURE — 99214 OFFICE O/P EST MOD 30 MIN: CPT

## 2022-02-02 PROCEDURE — 93000 ELECTROCARDIOGRAM COMPLETE: CPT

## 2022-02-03 ENCOUNTER — NON-APPOINTMENT (OUTPATIENT)
Age: 77
End: 2022-02-03

## 2022-02-04 ENCOUNTER — NON-APPOINTMENT (OUTPATIENT)
Age: 77
End: 2022-02-04

## 2022-02-07 ENCOUNTER — LABORATORY RESULT (OUTPATIENT)
Age: 77
End: 2022-02-07

## 2022-02-09 ENCOUNTER — NON-APPOINTMENT (OUTPATIENT)
Age: 77
End: 2022-02-09

## 2022-02-09 ENCOUNTER — RX RENEWAL (OUTPATIENT)
Age: 77
End: 2022-02-09

## 2022-02-10 ENCOUNTER — OUTPATIENT (OUTPATIENT)
Dept: OUTPATIENT SERVICES | Facility: HOSPITAL | Age: 77
LOS: 1 days | End: 2022-02-10
Payer: MEDICARE

## 2022-02-10 PROCEDURE — 93461 R&L HRT ART/VENTRICLE ANGIO: CPT | Mod: 26

## 2022-02-10 PROCEDURE — 93010 ELECTROCARDIOGRAM REPORT: CPT

## 2022-02-11 NOTE — REVIEW OF SYSTEMS
[Feeling Fatigued] : feeling fatigued [Blurry Vision] : no blurred vision [Sore Throat] : no sore throat [SOB] : shortness of breath [Dyspnea on exertion] : dyspnea during exertion [Chest Discomfort] : no chest discomfort [Cough] : no cough [Abdominal Pain] : no abdominal pain [Rash] : no rash [Dizziness] : no dizziness [Easy Bleeding] : no tendency for easy bleeding

## 2022-02-11 NOTE — REASON FOR VISIT
[Arrhythmia/ECG Abnorrmalities] : arrhythmia/ECG abnormalities [Spouse] : spouse [Atrial Fibrillation] : atrial fibrillation [Other: ____] : [unfilled] [FreeTextEntry2] : Follow up [FreeTextEntry1] : PVCs

## 2022-02-11 NOTE — PHYSICAL EXAM
[General Appearance - Well Developed] : well developed [General Appearance - In No Acute Distress] : no acute distress [Normal Conjunctiva] : the conjunctiva exhibited no abnormalities [Normal Jugular Venous V Waves Present] : normal jugular venous V waves present [Auscultation Breath Sounds / Voice Sounds] : lungs were clear to auscultation bilaterally [Heart Sounds] : normal S1 and S2 [Arterial Pulses Normal] : the arterial pulses were normal [Edema] : no peripheral edema present [Abdomen Soft] : soft [Abdomen Tenderness] : non-tender [Nail Clubbing] : no clubbing of the fingernails [Cyanosis, Localized] : no localized cyanosis [] : no rash [No Venous Stasis] : no venous stasis [Impaired Insight] : insight and judgment were intact [FreeTextEntry1] : Irregularly irregular

## 2022-02-11 NOTE — HISTORY OF PRESENT ILLNESS
[FreeTextEntry1] : Patient is a 76-year-old male who is seen for follow-up evaluation for his persistent atrial fibrillation.  He has had increased shortness of breath with fatigue recently.  He denies chest pain.  He denies awareness of a rapid heartbeat sensation in his chest.  \par Previous echocardiogram t showed EF 45 to 50%, left atrial diameter 5.3 cm, mild mitral regurgitation, mild aortic stenosis.  Left atrial volume index was 48 cc/m².  There is borderline elevation in pulmonary pressure.  No pericardial effusion.  Compared to the echo from March 2020 there are no significant changes. Catheter ablation was previously discussed with the patient did not want procedures done.\par \par He had an sleep study that was abnormal and CPAP was recommended.\par \par Other history noted for  hypertension treated with amlodipine.  He status post coronary artery bypass surgery in 2012.  Previous echo had showed preserved left ventricular function. \par \par Previous history: He had an EKG done at his primary care physician office that had revealed atrial fibrillation.  He underwent RANDY cardioversion but had recurrent atrial fibrillation fairly quickly thereafter.  He was not aware whether he felt better while in sinus rhythm.  \par \par Echocardiogram from 3/3/2020 showed ejection fraction 45 to 50%, left atrial diameter 6.1 cm 1 left atrial volume index 45 cc/m², mild to moderate mitral regurgitation, mild concentric left ventricular hypertrophy, moderate diastolic dysfunction, no pericardial effusion and mild pulmonary hypertension.  The septal thickness was 1.2 cm.\par \par RANDY performed 6/1/2020 at time of cardioversion did not show any evidence of intracardiac clot.  His ejection fraction then was read as 50%.  The mitral regurgitation was noted as mild.\par \par Cardiac catheterization performed 3/26/2019 showed patent LIMA graft to the LAD, saphenous vein graft to the first diagonal was occluded, saphenous vein graft to the second obtuse marginal had luminal irregularities.  The graft to the PDA was patent.\par \par Nuclear stress test performed February 2019 had shown no evidence of ischemia by EKG but small to medium-sized defect in lateral wall that was reversible suggestive of ischemia.  There was an inferoapical scar ejection fraction was noted 45%.\par \par Electrocardiogram ordered to assess rhythm, intervals, QT interval.  It atrial fibrillation with right bundle branch block/left anterior hemiblock.

## 2022-02-11 NOTE — DISCUSSION/SUMMARY
[FreeTextEntry1] : \par The patient previously had asymptomatic atrial fibrillation and now has increased shortness of breath.  Is not clear whether this is related to A. fib.  He has no exertional chest discomfort.  He has a prior history of CABG and coronary angiogram 2019 showed graft to the LAD was patent, graft to the first diagonal was occluded and graft to the obtuse marginal had luminal irregularities.  Because of his onset of shortness of breath when he was markedly short of breath during A. fib we would like to evaluate his coronaries first.  If this does not show any significant progression of disease then we would readdress his A. fib in terms of restoring sinus rhythm.  The options for restore sinus rhythm discussed with patient including possible RANDY cardioversion with antiarrhythmic versus ablation.  Plan is to obtain a coronary angiogram and then we will rediscuss further management\par

## 2022-02-14 ENCOUNTER — APPOINTMENT (OUTPATIENT)
Dept: CARDIOLOGY | Facility: CLINIC | Age: 77
End: 2022-02-14
Payer: MEDICARE

## 2022-02-14 VITALS
HEIGHT: 73 IN | HEART RATE: 68 BPM | OXYGEN SATURATION: 96 % | SYSTOLIC BLOOD PRESSURE: 140 MMHG | BODY MASS INDEX: 32.07 KG/M2 | WEIGHT: 242 LBS | DIASTOLIC BLOOD PRESSURE: 90 MMHG | TEMPERATURE: 97.3 F

## 2022-02-14 DIAGNOSIS — I25.10 ATHEROSCLEROTIC HEART DISEASE OF NATIVE CORONARY ARTERY WITHOUT ANGINA PECTORIS: ICD-10-CM

## 2022-02-14 DIAGNOSIS — I11.0 HYPERTENSIVE HEART DISEASE WITH HEART FAILURE: ICD-10-CM

## 2022-02-14 DIAGNOSIS — Z79.82 LONG TERM (CURRENT) USE OF ASPIRIN: ICD-10-CM

## 2022-02-14 DIAGNOSIS — Z79.01 LONG TERM (CURRENT) USE OF ANTICOAGULANTS: ICD-10-CM

## 2022-02-14 DIAGNOSIS — E66.9 OBESITY, UNSPECIFIED: ICD-10-CM

## 2022-02-14 DIAGNOSIS — E78.5 HYPERLIPIDEMIA, UNSPECIFIED: ICD-10-CM

## 2022-02-14 DIAGNOSIS — I08.8 OTHER RHEUMATIC MULTIPLE VALVE DISEASES: ICD-10-CM

## 2022-02-14 DIAGNOSIS — I27.20 PULMONARY HYPERTENSION, UNSPECIFIED: ICD-10-CM

## 2022-02-14 DIAGNOSIS — N40.0 BENIGN PROSTATIC HYPERPLASIA WITHOUT LOWER URINARY TRACT SYMPTOMS: ICD-10-CM

## 2022-02-14 DIAGNOSIS — I48.19 OTHER PERSISTENT ATRIAL FIBRILLATION: ICD-10-CM

## 2022-02-14 DIAGNOSIS — R60.0 LOCALIZED EDEMA: ICD-10-CM

## 2022-02-14 DIAGNOSIS — I25.82 CHRONIC TOTAL OCCLUSION OF CORONARY ARTERY: ICD-10-CM

## 2022-02-14 DIAGNOSIS — Z87.891 PERSONAL HISTORY OF NICOTINE DEPENDENCE: ICD-10-CM

## 2022-02-14 DIAGNOSIS — I50.9 HEART FAILURE, UNSPECIFIED: ICD-10-CM

## 2022-02-14 DIAGNOSIS — Z95.1 PRESENCE OF AORTOCORONARY BYPASS GRAFT: ICD-10-CM

## 2022-02-14 PROCEDURE — 99214 OFFICE O/P EST MOD 30 MIN: CPT

## 2022-02-14 RX ORDER — LOSARTAN POTASSIUM 25 MG/1
25 TABLET, FILM COATED ORAL
Qty: 90 | Refills: 3 | Status: DISCONTINUED | COMMUNITY
Start: 2021-11-02 | End: 2022-02-14

## 2022-02-15 ENCOUNTER — NON-APPOINTMENT (OUTPATIENT)
Age: 77
End: 2022-02-15

## 2022-02-17 ENCOUNTER — NON-APPOINTMENT (OUTPATIENT)
Age: 77
End: 2022-02-17

## 2022-02-18 ENCOUNTER — NON-APPOINTMENT (OUTPATIENT)
Age: 77
End: 2022-02-18

## 2022-03-21 ENCOUNTER — NON-APPOINTMENT (OUTPATIENT)
Age: 77
End: 2022-03-21

## 2022-03-21 ENCOUNTER — APPOINTMENT (OUTPATIENT)
Dept: ELECTROPHYSIOLOGY | Facility: CLINIC | Age: 77
End: 2022-03-21
Payer: MEDICARE

## 2022-03-21 VITALS
TEMPERATURE: 97.3 F | OXYGEN SATURATION: 97 % | HEIGHT: 73 IN | DIASTOLIC BLOOD PRESSURE: 88 MMHG | SYSTOLIC BLOOD PRESSURE: 160 MMHG | HEART RATE: 69 BPM | BODY MASS INDEX: 31.94 KG/M2 | WEIGHT: 241 LBS

## 2022-03-21 PROCEDURE — 93000 ELECTROCARDIOGRAM COMPLETE: CPT

## 2022-03-21 PROCEDURE — 99214 OFFICE O/P EST MOD 30 MIN: CPT

## 2022-04-08 ENCOUNTER — OUTPATIENT (OUTPATIENT)
Dept: OUTPATIENT SERVICES | Facility: HOSPITAL | Age: 77
LOS: 1 days | Discharge: HOME | End: 2022-04-08
Payer: MEDICARE

## 2022-04-08 ENCOUNTER — NON-APPOINTMENT (OUTPATIENT)
Age: 77
End: 2022-04-08

## 2022-04-08 PROCEDURE — 93325 DOPPLER ECHO COLOR FLOW MAPG: CPT | Mod: 26

## 2022-04-08 PROCEDURE — 92960 CARDIOVERSION ELECTRIC EXT: CPT

## 2022-04-08 PROCEDURE — 93320 DOPPLER ECHO COMPLETE: CPT | Mod: 26

## 2022-04-08 PROCEDURE — 93312 ECHO TRANSESOPHAGEAL: CPT | Mod: 26

## 2022-04-12 ENCOUNTER — APPOINTMENT (OUTPATIENT)
Dept: CARDIOLOGY | Facility: CLINIC | Age: 77
End: 2022-04-12
Payer: MEDICARE

## 2022-04-12 ENCOUNTER — NON-APPOINTMENT (OUTPATIENT)
Age: 77
End: 2022-04-12

## 2022-04-12 VITALS
TEMPERATURE: 97.3 F | WEIGHT: 235 LBS | HEART RATE: 75 BPM | OXYGEN SATURATION: 96 % | DIASTOLIC BLOOD PRESSURE: 82 MMHG | SYSTOLIC BLOOD PRESSURE: 130 MMHG | HEIGHT: 73 IN | BODY MASS INDEX: 31.14 KG/M2

## 2022-04-12 DIAGNOSIS — I11.0 HYPERTENSIVE HEART DISEASE WITH HEART FAILURE: ICD-10-CM

## 2022-04-12 DIAGNOSIS — I10 ESSENTIAL (PRIMARY) HYPERTENSION: ICD-10-CM

## 2022-04-12 DIAGNOSIS — I25.10 ATHEROSCLEROTIC HEART DISEASE OF NATIVE CORONARY ARTERY WITHOUT ANGINA PECTORIS: ICD-10-CM

## 2022-04-12 DIAGNOSIS — I42.9 CARDIOMYOPATHY, UNSPECIFIED: ICD-10-CM

## 2022-04-12 DIAGNOSIS — I48.91 UNSPECIFIED ATRIAL FIBRILLATION: ICD-10-CM

## 2022-04-12 DIAGNOSIS — I70.0 ATHEROSCLEROSIS OF AORTA: ICD-10-CM

## 2022-04-12 DIAGNOSIS — R94.31 ABNORMAL ELECTROCARDIOGRAM [ECG] [EKG]: ICD-10-CM

## 2022-04-12 DIAGNOSIS — I50.9 HEART FAILURE, UNSPECIFIED: ICD-10-CM

## 2022-04-12 DIAGNOSIS — I08.1 RHEUMATIC DISORDERS OF BOTH MITRAL AND TRICUSPID VALVES: ICD-10-CM

## 2022-04-12 DIAGNOSIS — Z79.01 LONG TERM (CURRENT) USE OF ANTICOAGULANTS: ICD-10-CM

## 2022-04-12 DIAGNOSIS — G47.30 SLEEP APNEA, UNSPECIFIED: ICD-10-CM

## 2022-04-12 PROCEDURE — 99214 OFFICE O/P EST MOD 30 MIN: CPT

## 2022-04-12 PROCEDURE — 93000 ELECTROCARDIOGRAM COMPLETE: CPT

## 2022-04-17 NOTE — REVIEW OF SYSTEMS
[Feeling Fatigued] : feeling fatigued [SOB] : shortness of breath [Dyspnea on exertion] : dyspnea during exertion [Blurry Vision] : no blurred vision [Sore Throat] : no sore throat [Chest Discomfort] : no chest discomfort [Cough] : no cough [Abdominal Pain] : no abdominal pain [Rash] : no rash [Dizziness] : no dizziness [Easy Bleeding] : no tendency for easy bleeding

## 2022-04-17 NOTE — HISTORY OF PRESENT ILLNESS
[FreeTextEntry1] : Patient seen in follow-up regarding symptoms of shortness of breath and persistent A. fib.  As part of his evaluation he underwent coronary angiography on 2/10/2022.  It showed that his LIMA to the LAD was patent.  His saphenous vein vein graft to obtuse marginal and RPDA were also patent.  It was known that he had an occluded graft to the diagonal branch and we do not think this is the cause of his symptoms.  It was felt that intervention was not needed.  Patient returns today with similar complaints of shortness of breath with mild exertion.  He also describes shortness of breath when bending to tie his shoe.\par \par His last echo from January 2022 which showed an EF of 45 to 50%.\par \par The patient was switched from losartan to Entresto and he initially felt somewhat better after this change in medication.  But he felt he was urinating a fair amount and had decrease his Entresto to once a day.\par \par \par Previous echocardiogram t showed EF 45 to 50%, left atrial diameter 5.3 cm, mild mitral regurgitation, mild aortic stenosis.  Left atrial volume index was 48 cc/m².  There is borderline elevation in pulmonary pressure.  No pericardial effusion.  Compared to the echo from March 2020 there are no significant changes. Catheter ablation was previously discussed with the patient did not want procedures done.\par \par He had an sleep study that was abnormal and CPAP was recommended.\par \par Other history noted for  hypertension treated with amlodipine.  He status post coronary artery bypass surgery in 2012.  Previous echo had showed preserved left ventricular function. \par \par Previous history: He had an EKG done at his primary care physician office that had revealed atrial fibrillation.  He underwent RANDY cardioversion but had recurrent atrial fibrillation fairly quickly thereafter.  He was not aware whether he felt better while in sinus rhythm.  \par \par Echocardiogram from 3/3/2020 showed ejection fraction 45 to 50%, left atrial diameter 6.1 cm 1 left atrial volume index 45 cc/m², mild to moderate mitral regurgitation, mild concentric left ventricular hypertrophy, moderate diastolic dysfunction, no pericardial effusion and mild pulmonary hypertension.  The septal thickness was 1.2 cm.\par \par RANDY performed 6/1/2020 at time of cardioversion did not show any evidence of intracardiac clot.  His ejection fraction then was read as 50%.  The mitral regurgitation was noted as mild.\par \par Cardiac catheterization performed 3/26/2019 showed patent LIMA graft to the LAD, saphenous vein graft to the first diagonal was occluded, saphenous vein graft to the second obtuse marginal had luminal irregularities.  The graft to the PDA was patent.\par \par Nuclear stress test performed February 2019 had shown no evidence of ischemia by EKG but small to medium-sized defect in lateral wall that was reversible suggestive of ischemia.  There was an inferoapical scar ejection fraction was noted 45%.\par \par

## 2022-04-17 NOTE — PHYSICAL EXAM
[General Appearance - Well Developed] : well developed [General Appearance - In No Acute Distress] : no acute distress [Normal Conjunctiva] : the conjunctiva exhibited no abnormalities [Normal Jugular Venous V Waves Present] : normal jugular venous V waves present [Auscultation Breath Sounds / Voice Sounds] : lungs were clear to auscultation bilaterally [Heart Sounds] : normal S1 and S2 [Arterial Pulses Normal] : the arterial pulses were normal [Edema] : no peripheral edema present [Abdomen Soft] : soft [Abdomen Tenderness] : non-tender [Nail Clubbing] : no clubbing of the fingernails [Cyanosis, Localized] : no localized cyanosis [No Venous Stasis] : no venous stasis [] : no rash [Impaired Insight] : insight and judgment were intact [FreeTextEntry1] : Irregularly irregular

## 2022-04-17 NOTE — CARDIOLOGY SUMMARY
[de-identified] : Atrial fibrillation \par -Right bundle branch block with left axis -bifascicular block. \par

## 2022-04-17 NOTE — DISCUSSION/SUMMARY
[FreeTextEntry1] : Patient ejection fraction is 45 to 50% and he has patent grafts with exception of the diagonal graft.  This should not be causing his symptoms of shortness of breath.  At this point his shortness of breath is either related to pulmonary conditions or possibly the A. fib.  The best option in this patient this point would be for us to restore sinus rhythm and see if his  symptoms improve.\par We discussed options in terms of restoring sinus rhythm.  Options would be electrical cardioversion versus electrical cardioversion on antiarrhythmic therapy versus catheter ablation procedure.  Would recommend restoring sinus rhythm and see if patient feels better in sinus rhythm.  If he feels better in sinus rhythm then we will consider catheter ablation.    Patient understands and would prefer that approach.  We do hope that he stays in sinus rhythm at least for a period time to assess his response.    We discussed the option of an antiarrhythmic to help maintain  sinus rhythm but concerned that he might be more symptomatic from the antiarrhythmic.  We also discussed his dosage of metoprolol ER 100mg daily.  Of note his symptoms of shortness of breath seem to have increased after the dose was increased.  Patient currently needs a higher dose for mainly rate control.  We might consider decreasing dose of metoprolol and adding adding a different agent to help with rate control.\par \par Plans to proceed with a RANDY/cardioversion.  We will schedule.\par \par Patient also will concurrently see a pulmonologist to reassess his lung function.\par \par \par \par

## 2022-04-21 NOTE — PRE-ANESTHESIA EVALUATION ADULT - NSPROPOSEDPROCEDFT_GEN_ALL_CORE
Cystoscopy, laser enucleation of prostate with morcellation Erythromycin Pregnancy And Lactation Text: This medication is Pregnancy Category B and is considered safe during pregnancy. It is also excreted in breast milk.

## 2022-05-13 ENCOUNTER — APPOINTMENT (OUTPATIENT)
Dept: CARDIOLOGY | Facility: CLINIC | Age: 77
End: 2022-05-13
Payer: MEDICARE

## 2022-05-13 ENCOUNTER — NON-APPOINTMENT (OUTPATIENT)
Age: 77
End: 2022-05-13

## 2022-05-13 VITALS
BODY MASS INDEX: 31.81 KG/M2 | OXYGEN SATURATION: 97 % | DIASTOLIC BLOOD PRESSURE: 80 MMHG | HEART RATE: 61 BPM | SYSTOLIC BLOOD PRESSURE: 132 MMHG | WEIGHT: 240 LBS | HEIGHT: 73 IN | TEMPERATURE: 97.1 F

## 2022-05-13 PROCEDURE — 93000 ELECTROCARDIOGRAM COMPLETE: CPT

## 2022-05-13 PROCEDURE — 99214 OFFICE O/P EST MOD 30 MIN: CPT

## 2022-06-01 ENCOUNTER — APPOINTMENT (OUTPATIENT)
Dept: ELECTROPHYSIOLOGY | Facility: CLINIC | Age: 77
End: 2022-06-01
Payer: MEDICARE

## 2022-06-01 ENCOUNTER — NON-APPOINTMENT (OUTPATIENT)
Age: 77
End: 2022-06-01

## 2022-06-01 VITALS
HEIGHT: 73 IN | BODY MASS INDEX: 31.81 KG/M2 | OXYGEN SATURATION: 100 % | HEART RATE: 77 BPM | SYSTOLIC BLOOD PRESSURE: 130 MMHG | DIASTOLIC BLOOD PRESSURE: 84 MMHG | WEIGHT: 240 LBS

## 2022-06-01 DIAGNOSIS — G47.33 OBSTRUCTIVE SLEEP APNEA (ADULT) (PEDIATRIC): ICD-10-CM

## 2022-06-01 PROCEDURE — 93000 ELECTROCARDIOGRAM COMPLETE: CPT

## 2022-06-01 PROCEDURE — 99214 OFFICE O/P EST MOD 30 MIN: CPT

## 2022-06-05 PROBLEM — G47.33 OSA (OBSTRUCTIVE SLEEP APNEA): Status: ACTIVE | Noted: 2020-11-19

## 2022-06-28 ENCOUNTER — NON-APPOINTMENT (OUTPATIENT)
Age: 77
End: 2022-06-28

## 2022-06-29 NOTE — CARDIOLOGY SUMMARY
[de-identified] : Atrial fibrillation \par Right bundle branch block with left axis -bifascicular block.

## 2022-06-29 NOTE — HISTORY OF PRESENT ILLNESS
[FreeTextEntry1] : Follow-up evaluation for 77-year-old man who has had symptoms of shortness of breath and persistent atrial fibrillation.\par \par He underwent RANDY cardioversion 4/8/2022.  While in sinus rhythm he felt better.  He has recurrence of atrial fibrillation and has recurrence of shortness of breath/fatigue.  \par Coronary angiography on 2/10/2022: LIMA to the LAD was patent.  His saphenous vein vein graft to obtuse marginal and RPDA were also patent.  It was known that he had an occluded graft to the diagonal branch and we do not think this is the cause of his symptoms.  It was felt that intervention was not needed. \par \par Echo from January 2022 which showed an EF of 45 to 50%.\par The patient was switched from losartan to Entresto and he initially felt somewhat better after this change in medication.  But he felt he was urinating a fair amount and had decrease his Entresto to once a day.\par \par \par Previous echocardiogram  showed EF 45 to 50%, left atrial diameter 5.3 cm, mild mitral regurgitation, mild aortic stenosis.  Left atrial volume index was 48 cc/m².  There is borderline elevation in pulmonary pressure.  No pericardial effusion.  Compared to the echo from March 2020 there are no significant changes. Catheter ablation was previously discussed with the patient did not want procedures done.\par \par He had an sleep study that was abnormal and CPAP was recommended.\par \par Other history noted for  hypertension treated with amlodipine.  He status post coronary artery bypass surgery in 2012.  Previous echo had showed preserved left ventricular function. \par \par \par Echocardiogram from 3/3/2020 showed ejection fraction 45 to 50%, left atrial diameter 6.1 cm , left atrial volume index 45 cc/m², mild to moderate mitral regurgitation, mild concentric left ventricular hypertrophy, moderate diastolic dysfunction, no pericardial effusion and mild pulmonary hypertension.  The septal thickness was 1.2 cm.\par \par RANDY performed 6/1/2020 at time of cardioversion did not show any evidence of intracardiac clot.  His ejection fraction then was read as 50%.  The mitral regurgitation was noted as mild.\par \par Cardiac catheterization performed 3/26/2019 showed patent LIMA graft to the LAD, saphenous vein graft to the first diagonal was occluded, saphenous vein graft to the second obtuse marginal had luminal irregularities.  The graft to the PDA was patent.\par \par Nuclear stress test performed February 2019 had shown no evidence of ischemia by EKG but small to medium-sized defect in lateral wall that was reversible suggestive of ischemia.  There was an inferoapical scar ejection fraction was noted 45%.\par \par

## 2022-06-29 NOTE — DISCUSSION/SUMMARY
[FreeTextEntry1] : Patient is a 77-year-old man with recurrent atrial fibrillation persistent and symptoms of shortness of breath as well as fatigue.  I have shown that when he was in sinus rhythm his symptoms improved.  Options discussed in detail with the patient.  Catheter ablation procedure discussed.  The success rate, complication rate, recurrence rate, redo rates were all discussed.  He does have enlarged left atrium longstanding A. fib and his success rate may be somewhat reduced.  \par He like to consider repeat cardioversion and antiarrhythmic.  We discussed amiodarone includes efficacy and side effects.  We will start amnio and schedule cardioversion.  He will decide on catheter ablation afterwards.\par \par \par

## 2022-06-30 ENCOUNTER — NON-APPOINTMENT (OUTPATIENT)
Age: 77
End: 2022-06-30

## 2022-07-07 ENCOUNTER — NON-APPOINTMENT (OUTPATIENT)
Age: 77
End: 2022-07-07

## 2022-07-08 ENCOUNTER — NON-APPOINTMENT (OUTPATIENT)
Age: 77
End: 2022-07-08

## 2022-07-12 ENCOUNTER — OUTPATIENT (OUTPATIENT)
Dept: OUTPATIENT SERVICES | Facility: HOSPITAL | Age: 77
LOS: 1 days | Discharge: HOME | End: 2022-07-12

## 2022-07-12 PROCEDURE — 93306 TTE W/DOPPLER COMPLETE: CPT | Mod: 26

## 2022-07-12 PROCEDURE — 92960 CARDIOVERSION ELECTRIC EXT: CPT

## 2022-07-12 PROCEDURE — 93010 ELECTROCARDIOGRAM REPORT: CPT

## 2022-07-14 ENCOUNTER — NON-APPOINTMENT (OUTPATIENT)
Age: 77
End: 2022-07-14

## 2022-07-15 ENCOUNTER — NON-APPOINTMENT (OUTPATIENT)
Age: 77
End: 2022-07-15

## 2022-07-15 ENCOUNTER — APPOINTMENT (OUTPATIENT)
Dept: CARDIOLOGY | Facility: CLINIC | Age: 77
End: 2022-07-15

## 2022-07-15 VITALS
HEART RATE: 94 BPM | HEIGHT: 73 IN | BODY MASS INDEX: 31.81 KG/M2 | SYSTOLIC BLOOD PRESSURE: 120 MMHG | DIASTOLIC BLOOD PRESSURE: 70 MMHG | TEMPERATURE: 97.5 F | WEIGHT: 240 LBS | OXYGEN SATURATION: 97 %

## 2022-07-15 PROCEDURE — 99214 OFFICE O/P EST MOD 30 MIN: CPT

## 2022-07-20 ENCOUNTER — OUTPATIENT (OUTPATIENT)
Dept: OUTPATIENT SERVICES | Facility: HOSPITAL | Age: 77
LOS: 1 days | End: 2022-07-20
Payer: MEDICAID

## 2022-07-20 PROCEDURE — 88305 TISSUE EXAM BY PATHOLOGIST: CPT | Mod: 26

## 2022-07-20 PROCEDURE — 88312 SPECIAL STAINS GROUP 1: CPT | Mod: 26

## 2022-07-21 ENCOUNTER — NON-APPOINTMENT (OUTPATIENT)
Age: 77
End: 2022-07-21

## 2022-07-21 DIAGNOSIS — I37.1 NONRHEUMATIC PULMONARY VALVE INSUFFICIENCY: ICD-10-CM

## 2022-07-21 DIAGNOSIS — Z01.810 ENCOUNTER FOR PREPROCEDURAL CARDIOVASCULAR EXAMINATION: ICD-10-CM

## 2022-07-21 DIAGNOSIS — I34.0 NONRHEUMATIC MITRAL (VALVE) INSUFFICIENCY: ICD-10-CM

## 2022-07-21 DIAGNOSIS — I07.1 RHEUMATIC TRICUSPID INSUFFICIENCY: ICD-10-CM

## 2022-07-26 ENCOUNTER — OUTPATIENT (OUTPATIENT)
Dept: OUTPATIENT SERVICES | Facility: HOSPITAL | Age: 77
LOS: 1 days | End: 2022-07-26
Payer: MEDICARE

## 2022-07-26 DIAGNOSIS — D64.9 ANEMIA, UNSPECIFIED: ICD-10-CM

## 2022-07-27 ENCOUNTER — RESULT REVIEW (OUTPATIENT)
Age: 77
End: 2022-07-27

## 2022-07-27 ENCOUNTER — APPOINTMENT (OUTPATIENT)
Age: 77
End: 2022-07-27

## 2022-07-27 VITALS
DIASTOLIC BLOOD PRESSURE: 76 MMHG | SYSTOLIC BLOOD PRESSURE: 147 MMHG | BODY MASS INDEX: 31.81 KG/M2 | HEART RATE: 73 BPM | HEIGHT: 73 IN | WEIGHT: 240 LBS | TEMPERATURE: 97.2 F | OXYGEN SATURATION: 95 %

## 2022-07-27 LAB
BASOPHILS # BLD AUTO: 0.05 K/UL — SIGNIFICANT CHANGE UP (ref 0–0.2)
BASOPHILS NFR BLD AUTO: 0.9 % — SIGNIFICANT CHANGE UP (ref 0–2)
EOSINOPHIL # BLD AUTO: 0.15 K/UL — SIGNIFICANT CHANGE UP (ref 0–0.5)
EOSINOPHIL NFR BLD AUTO: 2.6 % — SIGNIFICANT CHANGE UP (ref 0–6)
HCT VFR BLD CALC: 30.9 % — LOW (ref 39–50)
HGB BLD-MCNC: 9.2 G/DL — LOW (ref 13–17)
IMM GRANULOCYTES NFR BLD AUTO: 0.4 % — SIGNIFICANT CHANGE UP (ref 0–1.5)
LYMPHOCYTES # BLD AUTO: 1.53 K/UL — SIGNIFICANT CHANGE UP (ref 1–3.3)
LYMPHOCYTES # BLD AUTO: 26.8 % — SIGNIFICANT CHANGE UP (ref 13–44)
MCHC RBC-ENTMCNC: 21.4 PG — LOW (ref 27–34)
MCHC RBC-ENTMCNC: 29.8 GM/DL — LOW (ref 32–36)
MCV RBC AUTO: 71.9 FL — LOW (ref 80–100)
MONOCYTES # BLD AUTO: 0.66 K/UL — SIGNIFICANT CHANGE UP (ref 0–0.9)
MONOCYTES NFR BLD AUTO: 11.6 % — SIGNIFICANT CHANGE UP (ref 2–14)
NEUTROPHILS # BLD AUTO: 3.29 K/UL — SIGNIFICANT CHANGE UP (ref 1.8–7.4)
NEUTROPHILS NFR BLD AUTO: 57.7 % — SIGNIFICANT CHANGE UP (ref 43–77)
NRBC # BLD: 0 /100 WBCS — SIGNIFICANT CHANGE UP (ref 0–0)
PLATELET # BLD AUTO: 226 K/UL — SIGNIFICANT CHANGE UP (ref 150–400)
RBC # BLD: 4.3 M/UL — SIGNIFICANT CHANGE UP (ref 4.2–5.8)
RBC # FLD: 16.7 % — HIGH (ref 10.3–14.5)
WBC # BLD: 5.7 K/UL — SIGNIFICANT CHANGE UP (ref 3.8–10.5)
WBC # FLD AUTO: 5.7 K/UL — SIGNIFICANT CHANGE UP (ref 3.8–10.5)

## 2022-07-27 PROCEDURE — 99204 OFFICE O/P NEW MOD 45 MIN: CPT

## 2022-07-28 LAB
FOLATE SERPL-MCNC: 17.5 NG/ML
VIT B12 SERPL-MCNC: 240 PG/ML

## 2022-07-28 NOTE — CONSULT LETTER
[Dear  ___] : Dear ~NILDA, [Courtesy Letter:] : I had the pleasure of seeing your patient, [unfilled], in my office today. [Please see my note below.] : Please see my note below. [Consult Closing:] : Thank you very much for allowing me to participate in the care of this patient.  If you have any questions, please do not hesitate to contact me. [Sincerely,] : Sincerely, [FreeTextEntry2] : Dr. Rubén Drake [FreeTextEntry3] : Eladio Solis MD\par

## 2022-07-28 NOTE — HISTORY OF PRESENT ILLNESS
[de-identified] : Mr. Morocho has been following with his cardiologist for atrial fibrillation, a new diagnosis.  He has extensive coronary artery disease history, but had been noticing worsening fatigue while exercising, with associated shortness of breath and dyspnea on exertion with regular activities.\par \par After a cardioversion for his atrial fibrillation, he felt some relief.  However, when the fatigue and other symptoms persisted, he went back to his cardiologist.  There, he was found to have a significant drop in his hemoglobin.  On my review of his labs, in February, 2022, he had a hemoglobin of 13.6 g.\par \par Labs drawn on July 13, revealed a hemoglobin of 8.7 g.  MCV 74.6.  RDW 16%.  There were no other CBC abnormalities.  He underwent EGD and colonoscopy with Dr. Estevez, who found no evidence of bleeding.  He is pending capsule endoscopy.\par \par Ferritin checked at a later visit resulted at 8, percent saturation 4. He states that he is not a red meat eater.\par \par When questioned, Ye states that he has not a few weeks of dark stools in May, but did not make much of it.  He has been on aspirin and xarelto, the latter of which was initially held, then restarted at a lower dose 15 mg.  His stools are now of normal coloration.

## 2022-07-28 NOTE — REVIEW OF SYSTEMS
[Patient Intake Form Reviewed] : Patient intake form was reviewed [Fatigue] : fatigue [SOB on Exertion] : shortness of breath during exertion [Fever] : no fever [Chills] : no chills [Chest Pain] : no chest pain [Abdominal Pain] : no abdominal pain [Joint Pain] : no joint pain [Joint Stiffness] : no joint stiffness [Anxiety] : no anxiety [Depression] : no depression [Easy Bleeding] : no tendency for easy bleeding [Easy Bruising] : no tendency for easy bruising [Swollen Glands] : no swollen glands [FreeTextEntry4] : GERD history, had been significant

## 2022-07-28 NOTE — PHYSICAL EXAM
[Restricted in physically strenuous activity but ambulatory and able to carry out work of a light or sedentary nature] : Status 1- Restricted in physically strenuous activity but ambulatory and able to carry out work of a light or sedentary nature, e.g., light house work, office work [Normal] : affect appropriate [de-identified] : anicteric

## 2022-07-28 NOTE — REASON FOR VISIT
[Initial Consultation] : an initial consultation for [Spouse] : spouse [FreeTextEntry2] : Iron Deficiency Anemia

## 2022-07-28 NOTE — RESULTS/DATA
[FreeTextEntry1] : Mr. Morocho is a pleasant 77 year-old man with significant cardiac history, on aspirin and xarelto, here for iron deficiency anemia.

## 2022-07-29 ENCOUNTER — APPOINTMENT (OUTPATIENT)
Age: 77
End: 2022-07-29

## 2022-07-29 DIAGNOSIS — I50.22 CHRONIC SYSTOLIC (CONGESTIVE) HEART FAILURE: ICD-10-CM

## 2022-07-29 DIAGNOSIS — K44.9 DIAPHRAGMATIC HERNIA WITHOUT OBSTRUCTION OR GANGRENE: ICD-10-CM

## 2022-07-29 DIAGNOSIS — N40.1 BENIGN PROSTATIC HYPERPLASIA WITH LOWER URINARY TRACT SYMPTOMS: ICD-10-CM

## 2022-07-29 DIAGNOSIS — K57.90 DIVERTICULOSIS OF INTESTINE, PART UNSPECIFIED, WITHOUT PERFORATION OR ABSCESS WITHOUT BLEEDING: ICD-10-CM

## 2022-07-29 DIAGNOSIS — I11.0 HYPERTENSIVE HEART DISEASE WITH HEART FAILURE: ICD-10-CM

## 2022-07-29 DIAGNOSIS — D50.9 IRON DEFICIENCY ANEMIA, UNSPECIFIED: ICD-10-CM

## 2022-07-29 DIAGNOSIS — I25.10 ATHEROSCLEROTIC HEART DISEASE OF NATIVE CORONARY ARTERY WITHOUT ANGINA PECTORIS: ICD-10-CM

## 2022-07-29 DIAGNOSIS — Z95.1 PRESENCE OF AORTOCORONARY BYPASS GRAFT: ICD-10-CM

## 2022-07-29 DIAGNOSIS — Z79.01 LONG TERM (CURRENT) USE OF ANTICOAGULANTS: ICD-10-CM

## 2022-07-29 DIAGNOSIS — K64.8 OTHER HEMORRHOIDS: ICD-10-CM

## 2022-07-29 DIAGNOSIS — E78.5 HYPERLIPIDEMIA, UNSPECIFIED: ICD-10-CM

## 2022-07-29 DIAGNOSIS — I48.91 UNSPECIFIED ATRIAL FIBRILLATION: ICD-10-CM

## 2022-07-29 DIAGNOSIS — N13.8 OTHER OBSTRUCTIVE AND REFLUX UROPATHY: ICD-10-CM

## 2022-07-29 RX ORDER — FINASTERIDE 5 MG/1
1 TABLET, FILM COATED ORAL
Qty: 0 | Refills: 0 | DISCHARGE

## 2022-07-29 RX ORDER — MOXIFLOXACIN HYDROCHLORIDE TABLETS, 400 MG 400 MG/1
1 TABLET, FILM COATED ORAL
Qty: 0 | Refills: 0 | DISCHARGE

## 2022-07-29 RX ORDER — AMLODIPINE BESYLATE 2.5 MG/1
1 TABLET ORAL
Qty: 0 | Refills: 0 | DISCHARGE

## 2022-08-01 ENCOUNTER — APPOINTMENT (OUTPATIENT)
Dept: ELECTROPHYSIOLOGY | Facility: CLINIC | Age: 77
End: 2022-08-01

## 2022-08-01 DIAGNOSIS — D50.9 IRON DEFICIENCY ANEMIA, UNSPECIFIED: ICD-10-CM

## 2022-08-04 ENCOUNTER — APPOINTMENT (OUTPATIENT)
Age: 77
End: 2022-08-04

## 2022-08-04 ENCOUNTER — RESULT CHARGE (OUTPATIENT)
Age: 77
End: 2022-08-04

## 2022-08-04 PROCEDURE — 96365 THER/PROPH/DIAG IV INF INIT: CPT

## 2022-08-04 PROCEDURE — 96374 THER/PROPH/DIAG INJ IV PUSH: CPT

## 2022-08-04 PROCEDURE — 85027 COMPLETE CBC AUTOMATED: CPT

## 2022-08-05 ENCOUNTER — NON-APPOINTMENT (OUTPATIENT)
Age: 77
End: 2022-08-05

## 2022-08-05 ENCOUNTER — APPOINTMENT (OUTPATIENT)
Dept: CARDIOLOGY | Facility: CLINIC | Age: 77
End: 2022-08-05

## 2022-08-05 VITALS
BODY MASS INDEX: 32.74 KG/M2 | HEIGHT: 73 IN | HEART RATE: 82 BPM | OXYGEN SATURATION: 96 % | WEIGHT: 247 LBS | SYSTOLIC BLOOD PRESSURE: 136 MMHG | TEMPERATURE: 97.1 F | DIASTOLIC BLOOD PRESSURE: 74 MMHG

## 2022-08-05 DIAGNOSIS — R93.1 ABNORMAL FINDINGS ON DIAGNOSTIC IMAGING OF HEART AND CORONARY CIRCULATION: ICD-10-CM

## 2022-08-05 DIAGNOSIS — R06.02 SHORTNESS OF BREATH: ICD-10-CM

## 2022-08-05 PROCEDURE — 93000 ELECTROCARDIOGRAM COMPLETE: CPT

## 2022-08-05 PROCEDURE — 99214 OFFICE O/P EST MOD 30 MIN: CPT

## 2022-08-05 RX ORDER — SACUBITRIL AND VALSARTAN 24; 26 MG/1; MG/1
24-26 TABLET, FILM COATED ORAL TWICE DAILY
Qty: 180 | Refills: 3 | Status: DISCONTINUED | COMMUNITY
Start: 2022-02-14 | End: 2022-08-05

## 2022-08-05 RX ORDER — RIVAROXABAN 20 MG/1
20 TABLET, FILM COATED ORAL
Qty: 90 | Refills: 3 | Status: DISCONTINUED | COMMUNITY
Start: 2020-08-21 | End: 2022-08-05

## 2022-08-08 PROBLEM — R06.02 SHORT OF BREATH ON EXERTION: Status: ACTIVE | Noted: 2022-02-02

## 2022-08-08 PROBLEM — R93.1 DECREASED CARDIAC EJECTION FRACTION: Status: ACTIVE | Noted: 2022-08-08

## 2022-08-08 RX ORDER — RIVAROXABAN 20 MG/1
20 TABLET, FILM COATED ORAL
Qty: 90 | Refills: 1 | Status: DISCONTINUED | COMMUNITY
End: 2022-08-08

## 2022-08-08 NOTE — REASON FOR VISIT
[Symptom and Test Evaluation] : symptom and test evaluation [Cardiac Failure] : cardiac failure [Arrhythmia/ECG Abnorrmalities] : arrhythmia/ECG abnormalities [Hyperlipidemia] : hyperlipidemia [Hypertension] : hypertension [Coronary Artery Disease] : coronary artery disease [Family Member] : family member [FreeTextEntry3] : Dr. Reno Lund

## 2022-08-08 NOTE — CARDIOLOGY SUMMARY
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 [de-identified] : 3/26/2019: LIMA to LAD patent, SVG to RPDA and SVG to OM 2 patent.  SVG to D1 likely occluded\par 2//2022: LIMA to LAD patent, SVG to RPDA and SVG to OM 2 patent.  SVG to D1 likely occluded

## 2022-08-08 NOTE — DISCUSSION/SUMMARY
[Patient] : the patient [With Me] : with me [___ Week(s)] : in [unfilled] week(s) [FreeTextEntry1] : 77M w/ PMH as above presenting for routine follow up.  He remains in atrial fibrillation after cardioversion.  His course has been complicated by a newly diagnosed anemia. He has had 2 iron infusion. He is waiting for his Capsule study\par Pt was cleared to restart Xarelto 20mg by hematology. Rx sent\par \par 1. CAD -   Continue drop lipitor 40 mg PO QHS, no myalgias despite mildly elevated CPK\par 2. AF -Restart Xarelto 20mg, on Amiodarone and Toprol \par 3. HTN/chronic systolic CHF - controlled, continue Toprol 100 mg p.o. daily and\par 4. Reduced EF 35% on recent Echo: pt did not restart Entresto previously. I have advised him to restart Entresto 24-26 mg p.o. twice daily. new Rx sent\par 5. Anemia. Hgb 9.2 on last CBC. no active bleeding at this time\par \par Pt has a follow up at the end of the month\par Repeat BMP 1 week after starting Entresto. Baseline Creat 1.34\par \par Sincerely,\par \par Margaret Ginnyfe ANP-C\par Patients history, testing, and plan reviewed with supervising MD: Dr. Elgin Pisano MD, [facility], [facility]

## 2022-08-08 NOTE — PHYSICAL EXAM
[Well Developed] : well developed [Well Nourished] : well nourished [No Acute Distress] : no acute distress [Normal S1, S2] : normal S1, S2 [No Rub] : no rub [No Gallop] : no gallop [Clear Lung Fields] : clear lung fields [Good Air Entry] : good air entry [No Respiratory Distress] : no respiratory distress  [Normal Gait] : normal gait [No Rash] : no rash [Moves all extremities] : moves all extremities [Normal Speech] : normal speech [Alert and Oriented] : alert and oriented [Normal memory] : normal memory [de-identified] : pale conjunctiva [de-identified] : 1/6 systolic murmur over the second right intercostal space. [de-identified] : Irregularly irregular [de-identified] : trace edema

## 2022-08-08 NOTE — REVIEW OF SYSTEMS
[Feeling Fatigued] : feeling fatigued [Dyspnea on exertion] : dyspnea during exertion [Negative] : Heme/Lymph [SOB] : no shortness of breath

## 2022-08-08 NOTE — HISTORY OF PRESENT ILLNESS
[FreeTextEntry1] : 77M w/ PMH of HTN, HLD, obesity, CAD s/p CABG in 2012 (LIMA to LAD, SVG to OM, Diag, RPDA) presents for routine follow up.  Has noted increased SOB and PAULA with regular activities.  Is able to swim about 4 laps before having to stop.  However, when walking upstairs or for prolonged periods he gets PAULA.  Denies chest pain and palpitations.  Reports chronic right lower extremity edema since the CABG.\par \par He had a recent EKG done at his primary care provider's office which revealed atrial fibrillation in addition to stable conduction disease.  He continues to experience dyspnea on mild exertion.  He denies anginal chest pains or lower extremity edema. \par \par 7/15/2022:\par Since our last visit he underwent cardioversion and had recurrence of his atrial fibrillation after 1 to 2 hours.  He did feel symptomatically better while walking around the recovery room after the cardioversion.  He then began to feel fatigue and shortness of breath.  He had repeat blood work which revealed a five-point drop in his hemoglobin.  He is due to see his primary care doctor next week.  He denies any black stools or bright red blood per rectum.\par \par 8/5/2022:\par Patient still complains of fatigue however improving and shortness of breath that has remained the same. he is waiting for his Capsule study. He is now following with hematology.  He has had 2 iron infusions.  Hematology cleared him to restart Xarelto 20 mg daily.  Most recent hemoglobin 9.2.  Last creatinine 1.34.  Patient does not remember why he stopped his Entresto however now with decreased EF to 35% on recent echo from hospital 7/12/2022.  Patient follows with Dr. Wakefield\par \par EKG today showing he is back in Afib rate controlled, RBBB with LAFB. He is maintained on Xarelto, Amiodarone and Metoprolol.

## 2022-09-12 ENCOUNTER — OUTPATIENT (OUTPATIENT)
Dept: OUTPATIENT SERVICES | Facility: HOSPITAL | Age: 77
LOS: 1 days | End: 2022-09-12
Payer: MEDICARE

## 2022-09-12 ENCOUNTER — RESULT REVIEW (OUTPATIENT)
Age: 77
End: 2022-09-12

## 2022-09-12 ENCOUNTER — APPOINTMENT (OUTPATIENT)
Age: 77
End: 2022-09-12

## 2022-09-12 VITALS
HEART RATE: 73 BPM | DIASTOLIC BLOOD PRESSURE: 92 MMHG | OXYGEN SATURATION: 98 % | HEIGHT: 73 IN | BODY MASS INDEX: 32.2 KG/M2 | TEMPERATURE: 97.4 F | SYSTOLIC BLOOD PRESSURE: 147 MMHG | WEIGHT: 243 LBS

## 2022-09-12 DIAGNOSIS — D50.9 IRON DEFICIENCY ANEMIA, UNSPECIFIED: ICD-10-CM

## 2022-09-12 DIAGNOSIS — D64.9 ANEMIA, UNSPECIFIED: ICD-10-CM

## 2022-09-12 LAB
BASOPHILS # BLD AUTO: 0.05 K/UL — SIGNIFICANT CHANGE UP (ref 0–0.2)
BASOPHILS NFR BLD AUTO: 0.8 % — SIGNIFICANT CHANGE UP (ref 0–2)
EOSINOPHIL # BLD AUTO: 0.12 K/UL — SIGNIFICANT CHANGE UP (ref 0–0.5)
EOSINOPHIL NFR BLD AUTO: 2 % — SIGNIFICANT CHANGE UP (ref 0–6)
HCT VFR BLD CALC: 41.8 % — SIGNIFICANT CHANGE UP (ref 39–50)
HGB BLD-MCNC: 12.9 G/DL — LOW (ref 13–17)
IMM GRANULOCYTES NFR BLD AUTO: 0.2 % — SIGNIFICANT CHANGE UP (ref 0–1.5)
LYMPHOCYTES # BLD AUTO: 1.06 K/UL — SIGNIFICANT CHANGE UP (ref 1–3.3)
LYMPHOCYTES # BLD AUTO: 17.9 % — SIGNIFICANT CHANGE UP (ref 13–44)
MCHC RBC-ENTMCNC: 22.8 PG — LOW (ref 27–34)
MCHC RBC-ENTMCNC: 30.9 GM/DL — LOW (ref 32–36)
MCV RBC AUTO: 73.9 FL — LOW (ref 80–100)
MONOCYTES # BLD AUTO: 0.61 K/UL — SIGNIFICANT CHANGE UP (ref 0–0.9)
MONOCYTES NFR BLD AUTO: 10.3 % — SIGNIFICANT CHANGE UP (ref 2–14)
NEUTROPHILS # BLD AUTO: 4.06 K/UL — SIGNIFICANT CHANGE UP (ref 1.8–7.4)
NEUTROPHILS NFR BLD AUTO: 68.8 % — SIGNIFICANT CHANGE UP (ref 43–77)
NRBC # BLD: 0 /100 WBCS — SIGNIFICANT CHANGE UP (ref 0–0)
PLATELET # BLD AUTO: 246 K/UL — SIGNIFICANT CHANGE UP (ref 150–400)
RBC # BLD: 5.66 M/UL — SIGNIFICANT CHANGE UP (ref 4.2–5.8)
RBC # FLD: 22.9 % — HIGH (ref 10.3–14.5)
WBC # BLD: 5.91 K/UL — SIGNIFICANT CHANGE UP (ref 3.8–10.5)
WBC # FLD AUTO: 5.91 K/UL — SIGNIFICANT CHANGE UP (ref 3.8–10.5)

## 2022-09-12 PROCEDURE — 99213 OFFICE O/P EST LOW 20 MIN: CPT

## 2022-09-12 NOTE — PHYSICAL EXAM
[Restricted in physically strenuous activity but ambulatory and able to carry out work of a light or sedentary nature] : Status 1- Restricted in physically strenuous activity but ambulatory and able to carry out work of a light or sedentary nature, e.g., light house work, office work [Normal] : affect appropriate [de-identified] : anicteric

## 2022-09-12 NOTE — REVIEW OF SYSTEMS
[Fatigue] : fatigue [SOB on Exertion] : shortness of breath during exertion [Fever] : no fever [Chills] : no chills [Chest Pain] : no chest pain [Abdominal Pain] : no abdominal pain [Joint Pain] : no joint pain [Joint Stiffness] : no joint stiffness [Anxiety] : no anxiety [Depression] : no depression [Easy Bleeding] : no tendency for easy bleeding [Easy Bruising] : no tendency for easy bruising [Swollen Glands] : no swollen glands [FreeTextEntry4] : GERD history, had been significant

## 2022-09-12 NOTE — REASON FOR VISIT
[Spouse] : spouse [Follow-Up Visit] : a follow-up visit for [FreeTextEntry2] : Iron Deficiency Anemia

## 2022-09-12 NOTE — HISTORY OF PRESENT ILLNESS
[de-identified] : Mr. Morocho has been following with his cardiologist for atrial fibrillation, a new diagnosis.  He has extensive coronary artery disease history, but had been noticing worsening fatigue while exercising, with associated shortness of breath and dyspnea on exertion with regular activities.\par \par After a cardioversion for his atrial fibrillation, he felt some relief.  However, when the fatigue and other symptoms persisted, he went back to his cardiologist.  There, he was found to have a significant drop in his hemoglobin.  On my review of his labs, in February, 2022, he had a hemoglobin of 13.6 g.\par \par Labs drawn on July 13, revealed a hemoglobin of 8.7 g.  MCV 74.6.  RDW 16%.  There were no other CBC abnormalities.  He underwent EGD and colonoscopy with Dr. Estevez, who found no evidence of bleeding.  He is pending capsule endoscopy.\par \par Ferritin checked at a later visit resulted at 8, percent saturation 4. He states that he is not a red meat eater.\par \par When questioned, Ye states that he has not a few weeks of dark stools in May, but did not make much of it.  He has been on aspirin and xarelto, the latter of which was initially held, then restarted at a lower dose 15 mg.  His stools are now of normal coloration. [de-identified] : Received IV iron at the end of July and early August of this year.  Tolerated well without any toxicity. Initially felt very well, went on vacation to [place]. has been feeling less well more recently. Has been changing his entresto dosing because it makes him urinate.\par \par Pending capsule endoscopy.

## 2022-09-13 LAB — FERRITIN SERPL-MCNC: 24 NG/ML

## 2022-09-20 ENCOUNTER — APPOINTMENT (OUTPATIENT)
Age: 77
End: 2022-09-20

## 2022-09-20 RX ORDER — OMEPRAZOLE 10 MG/1
1 CAPSULE, DELAYED RELEASE ORAL
Qty: 0 | Refills: 0 | DISCHARGE

## 2022-09-27 ENCOUNTER — NON-APPOINTMENT (OUTPATIENT)
Age: 77
End: 2022-09-27

## 2022-09-27 ENCOUNTER — APPOINTMENT (OUTPATIENT)
Age: 77
End: 2022-09-27

## 2022-09-27 ENCOUNTER — APPOINTMENT (OUTPATIENT)
Dept: CARDIOLOGY | Facility: CLINIC | Age: 77
End: 2022-09-27

## 2022-09-27 VITALS
SYSTOLIC BLOOD PRESSURE: 138 MMHG | HEIGHT: 73 IN | TEMPERATURE: 97.3 F | OXYGEN SATURATION: 96 % | WEIGHT: 239 LBS | HEART RATE: 70 BPM | BODY MASS INDEX: 31.68 KG/M2 | DIASTOLIC BLOOD PRESSURE: 80 MMHG

## 2022-09-27 PROCEDURE — 96374 THER/PROPH/DIAG INJ IV PUSH: CPT

## 2022-09-27 PROCEDURE — 96365 THER/PROPH/DIAG IV INF INIT: CPT

## 2022-09-27 PROCEDURE — 99215 OFFICE O/P EST HI 40 MIN: CPT

## 2022-09-27 PROCEDURE — 36415 COLL VENOUS BLD VENIPUNCTURE: CPT

## 2022-09-27 PROCEDURE — 85027 COMPLETE CBC AUTOMATED: CPT

## 2022-09-27 RX ORDER — PANTOPRAZOLE 40 MG/1
40 TABLET, DELAYED RELEASE ORAL DAILY
Refills: 0 | Status: DISCONTINUED | COMMUNITY
End: 2022-09-27

## 2022-09-27 RX ORDER — DEXLANSOPRAZOLE 60 MG/1
60 CAPSULE, DELAYED RELEASE ORAL DAILY
Refills: 0 | Status: ACTIVE | COMMUNITY

## 2022-10-06 ENCOUNTER — NON-APPOINTMENT (OUTPATIENT)
Age: 77
End: 2022-10-06

## 2022-10-06 ENCOUNTER — APPOINTMENT (OUTPATIENT)
Dept: ELECTROPHYSIOLOGY | Facility: CLINIC | Age: 77
End: 2022-10-06

## 2022-10-06 VITALS
WEIGHT: 242 LBS | OXYGEN SATURATION: 96 % | SYSTOLIC BLOOD PRESSURE: 120 MMHG | HEART RATE: 76 BPM | BODY MASS INDEX: 32.07 KG/M2 | DIASTOLIC BLOOD PRESSURE: 72 MMHG | TEMPERATURE: 98.9 F | HEIGHT: 73 IN

## 2022-10-06 PROCEDURE — 93000 ELECTROCARDIOGRAM COMPLETE: CPT

## 2022-10-06 PROCEDURE — 99205 OFFICE O/P NEW HI 60 MIN: CPT

## 2022-10-06 RX ORDER — ASPIRIN 81 MG
81 TABLET, DELAYED RELEASE (ENTERIC COATED) ORAL DAILY
Refills: 0 | Status: DISCONTINUED | COMMUNITY
End: 2022-10-06

## 2022-10-06 RX ORDER — HYDROCODONE BITARTRATE AND ACETAMINOPHEN 10; 325 MG/1; MG/1
10-325 TABLET ORAL
Qty: 60 | Refills: 0 | Status: DISCONTINUED | COMMUNITY
Start: 2020-12-17 | End: 2022-10-06

## 2022-10-06 RX ORDER — HYDROCODONE BITARTRATE AND HOMATROPINE METHYLBROMIDE 5; 1.5 MG/5ML; MG/5ML
5-1.5 SYRUP ORAL
Qty: 473 | Refills: 0 | Status: DISCONTINUED | COMMUNITY
Start: 2020-12-17 | End: 2022-10-06

## 2022-10-06 RX ORDER — ZOLPIDEM TARTRATE 5 MG/1
5 TABLET ORAL
Qty: 10 | Refills: 0 | Status: DISCONTINUED | COMMUNITY
Start: 2022-02-09 | End: 2022-10-06

## 2022-10-06 NOTE — CARDIOLOGY SUMMARY
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  SVG to D1 likely occluded\par 2//2022: LIMA to LAD patent, SVG to RPDA and SVG to OM 2 patent.  SVG to D1 likely occluded

## 2022-10-06 NOTE — HISTORY OF PRESENT ILLNESS
[FreeTextEntry1] : 77 gentleman with history of HTN, HLD, obesity, CAD s/p CABG in 2012 (LIMA to LAD, SVG to OM, Diag, RPDA), RBBB/bifasicular block, who presents for evaluation of persistent atrial fibrillation.   \par \par He was initially diagnosed with AF 2/2020 on routine ECG with his primary doctor. He reported associated increased SOB and PAULA with regular activities. He has been an active swimmer and is able to swim about 4 laps before having to stop. However, when walking upstairs or exercising for prolonged periods he gets short of breath. He underwent cardioversion 6/1/2020, but had recurrence of AF 2-3 months later. He remained in atrial fibrillation until 4/8/22, when cardioversion was performed again. However, he then had recurrent AF within 1 month.  He was started on amiodarone and has continued on it for the last several months. He had another DCCV in 7/2022, but again AF recurred several weeks later. He does note that he feels better after DCCV for a couple weeks, with improved energy and dyspnea, but gets worse again when AF recurs.  \par \par Complicating this has been recurrent anemia. Due to his fatigue he has also had workup notable for microcytic anemia with hgb 8.7 in 7/2022. He underwent colonoscopy and EGD which were unremarkable. Recently he had pill endoscopy and is awaiting follow-up- he was told there was something noted, and he was told to stop ASA. He has follow-up with GI pending. He has also received iron transfusions, and felt better after this, and notes his Hgb did go up to >12 with this. He has also been noted to have GERD and hiatal hernia, and is now on PPI.  \par \par He had been on Eliquis for anticoagulation initially, but changed to Xarelto due to stomach upset. He reports he is tolerating Xarelto better.  \par \par He was previously seen by Dr. Wakefield and AF ablation was planned, but this was deferred due to anemia, and now he requested another opinion.  \par \par In addition, he has seen Dr Park in the past who has recommended left atrial appendage occlusion.  \par \par ECG today reveals atrial fibrillation with RBBB and LAFB, rate 74 bpm,  ms.  \par \par Of note ECG in 2019 noted LAFB without RBBB (qR, QRS duration 118 ms). By ECG 2/2020 bifasicular block and AF were present.  \par \par

## 2022-10-06 NOTE — DISCUSSION/SUMMARY
[FreeTextEntry1] : 77 gentleman with history of HTN, HLD, obesity, CAD s/p CABG in 2012 (LIMA to LAD, SVG to OM, Diag, RPDA), RBBB/bifasicular block, who presents for evaluation of persistent atrial fibrillation.  He has had persistent atrial fibrillation for the last 2.5 years, which has been associated with exertional dyspnea, exercise intolerance and fatigue. He has felt better after DCCV, but has had recurrent persistent atrial fibrillation shortly after DCCV on 3 occasions, the last one (in 7/2022) AF recurred despite starting amiodarone. His course has been complicated by symptomatic anemia with likely slow GI bleeding, and he has felt worse when he was both anemic and in atrial fibrillation, and has had some improvement his his anemia has improved with iron infusions. Despite this, he remains symptomatic likely in part related to persistent atrial fibrillation. In addition, he has had progressive cardiomyopathy with LVEF noted to be 45-50% in setting of AF (whereas LVEF was 60% in 6/2021). Given this, I do think he will feel better with rhythm control. Given recurrence despite amiodarone and now essentially long-standing persistent AF, an AF ablation would be the best option for attempted rhythm control. We did discuss AF ablation in detail, including suboptimal success rates in the setting of longstanding persistent AF, and potential procedure related complications including bleeding, vascular injury, cardiac perforation, stroke and esophageal injury. He expressed understanding, and does want to proceed with AF Ablation.  \par \par We did also discuss the importance of continued anticoagulation for the post-procedure period, which could be complicated by suspected GI bleed. He will followup with GI for the results of his endoscopy, and if intervention is required this should be done prior to ablation. Furthermore, if there is a source of bleeding that may impede his ability to stay on anticoagulation, this may preclude AF ablation at this time. Given his high risk of bleeding complications with long-term anticoagulation, as well as need for concomitant antiplatelet therapy, he is also a good candidate for left atrial appendage occlusion- we reviewed this as well, and he did express interest in this. Given his symptoms, will prioritize AF ablation, and proceed with LAAO in the future to avoid long-term anticoagulation.  \par \par -AF ablation. RANDY pre ablation (evaluate for LAAO as well). Continue Xarelto through day prior to procedure. Will do EP study to evaluate extent of conduction disease during ablation. Given bifaiscular block, there is a chance he will need ppm in future as well, which we also discussed.  \par \par -GI follow-up as above to be done prior to proceeding with ablation \par \par -can hold ASA for now but will followup pending course.  \par \par -likely LAAO in future.   [EKG obtained to assist in diagnosis and management of assessed problem(s)] : EKG obtained to assist in diagnosis and management of assessed problem(s)

## 2022-10-06 NOTE — REVIEW OF SYSTEMS
[Feeling Fatigued] : feeling fatigued [Dyspnea on exertion] : dyspnea during exertion [Negative] : Heme/Lymph [SOB] : no shortness of breath [Lower Ext Edema] : no extremity edema [Palpitations] : no palpitations [Syncope] : no syncope

## 2022-10-06 NOTE — PHYSICAL EXAM
[Well Developed] : well developed [Well Nourished] : well nourished [No Acute Distress] : no acute distress [Normal Conjunctiva] : normal conjunctiva [Normal Venous Pressure] : normal venous pressure [No Carotid Bruit] : no carotid bruit [Normal S1, S2] : normal S1, S2 [No Rub] : no rub [No Gallop] : no gallop [Clear Lung Fields] : clear lung fields [Good Air Entry] : good air entry [No Respiratory Distress] : no respiratory distress  [Soft] : abdomen soft [Non Tender] : non-tender [No Masses/organomegaly] : no masses/organomegaly [Normal Bowel Sounds] : normal bowel sounds [Normal Gait] : normal gait [No Edema] : no edema [No Cyanosis] : no cyanosis [No Clubbing] : no clubbing [No Varicosities] : no varicosities [No Rash] : no rash [No Skin Lesions] : no skin lesions [Moves all extremities] : moves all extremities [No Focal Deficits] : no focal deficits [Normal Speech] : normal speech [Alert and Oriented] : alert and oriented [Normal memory] : normal memory [de-identified] : 1/6 systolic murmur over the second right intercostal space. [de-identified] : Irregularly irregular

## 2022-10-06 NOTE — REASON FOR VISIT
[Arrhythmia/ECG Abnorrmalities] : arrhythmia/ECG abnormalities [Spouse] : spouse [FreeTextEntry3] : Dr Darke

## 2022-10-26 ENCOUNTER — OUTPATIENT (OUTPATIENT)
Dept: OUTPATIENT SERVICES | Facility: HOSPITAL | Age: 77
LOS: 1 days | End: 2022-10-26
Payer: MEDICARE

## 2022-10-26 DIAGNOSIS — C50.921 MALIGNANT NEOPLASM OF UNSPECIFIED SITE OF RIGHT MALE BREAST: ICD-10-CM

## 2022-10-31 ENCOUNTER — RESULT REVIEW (OUTPATIENT)
Age: 77
End: 2022-10-31

## 2022-10-31 ENCOUNTER — APPOINTMENT (OUTPATIENT)
Age: 77
End: 2022-10-31

## 2022-10-31 LAB
BASOPHILS # BLD AUTO: 0.04 K/UL — SIGNIFICANT CHANGE UP (ref 0–0.2)
BASOPHILS NFR BLD AUTO: 0.7 % — SIGNIFICANT CHANGE UP (ref 0–2)
EOSINOPHIL # BLD AUTO: 0.12 K/UL — SIGNIFICANT CHANGE UP (ref 0–0.5)
EOSINOPHIL NFR BLD AUTO: 2.1 % — SIGNIFICANT CHANGE UP (ref 0–6)
HCT VFR BLD CALC: 52.8 % — HIGH (ref 39–50)
HGB BLD-MCNC: 16.6 G/DL — SIGNIFICANT CHANGE UP (ref 13–17)
IMM GRANULOCYTES NFR BLD AUTO: 0.2 % — SIGNIFICANT CHANGE UP (ref 0–0.9)
LYMPHOCYTES # BLD AUTO: 1.42 K/UL — SIGNIFICANT CHANGE UP (ref 1–3.3)
LYMPHOCYTES # BLD AUTO: 25 % — SIGNIFICANT CHANGE UP (ref 13–44)
MCHC RBC-ENTMCNC: 24.3 PG — LOW (ref 27–34)
MCHC RBC-ENTMCNC: 31.4 GM/DL — LOW (ref 32–36)
MCV RBC AUTO: 77.3 FL — LOW (ref 80–100)
MONOCYTES # BLD AUTO: 0.63 K/UL — SIGNIFICANT CHANGE UP (ref 0–0.9)
MONOCYTES NFR BLD AUTO: 11.1 % — SIGNIFICANT CHANGE UP (ref 2–14)
NEUTROPHILS # BLD AUTO: 3.45 K/UL — SIGNIFICANT CHANGE UP (ref 1.8–7.4)
NEUTROPHILS NFR BLD AUTO: 60.9 % — SIGNIFICANT CHANGE UP (ref 43–77)
NRBC # BLD: 0 /100 WBCS — SIGNIFICANT CHANGE UP (ref 0–0)
PLATELET # BLD AUTO: 164 K/UL — SIGNIFICANT CHANGE UP (ref 150–400)
RBC # BLD: 6.83 M/UL — HIGH (ref 4.2–5.8)
RBC # FLD: 24.1 % — HIGH (ref 10.3–14.5)
WBC # BLD: 5.67 K/UL — SIGNIFICANT CHANGE UP (ref 3.8–10.5)
WBC # FLD AUTO: 5.67 K/UL — SIGNIFICANT CHANGE UP (ref 3.8–10.5)

## 2022-10-31 PROCEDURE — 99213 OFFICE O/P EST LOW 20 MIN: CPT

## 2022-10-31 PROCEDURE — 85027 COMPLETE CBC AUTOMATED: CPT

## 2022-10-31 NOTE — HISTORY OF PRESENT ILLNESS
[de-identified] : Mr. Morocho has been following with his cardiologist for atrial fibrillation, a new diagnosis.  He has extensive coronary artery disease history, but had been noticing worsening fatigue while exercising, with associated shortness of breath and dyspnea on exertion with regular activities.\par \par After a cardioversion for his atrial fibrillation, he felt some relief.  However, when the fatigue and other symptoms persisted, he went back to his cardiologist.  There, he was found to have a significant drop in his hemoglobin.  On my review of his labs, in February, 2022, he had a hemoglobin of 13.6 g.\par \par Labs drawn on July 13, revealed a hemoglobin of 8.7 g.  MCV 74.6.  RDW 16%.  There were no other CBC abnormalities.  He underwent EGD and colonoscopy with Dr. Estevez, who found no evidence of bleeding.  He is pending capsule endoscopy.\par \par Ferritin checked at a later visit resulted at 8, percent saturation 4. He states that he is not a red meat eater.\par \par When questioned, Ye states that he has not a few weeks of dark stools in May, but did not make much of it.  He has been on aspirin and xarelto, the latter of which was initially held, then restarted at a lower dose 15 mg.  His stools are now of normal coloration.\par \par 9/22 - Received IV iron at the end of July and early August of this year.  Tolerated well without any toxicity. Initially felt very well, went on vacation to [place]. [de-identified] : On Xarelto for atrial fibrillation that recurred after cardioversion.  Referred back to electrophysiology for consideration of ablation at his last visit with Dr. Drake, may have procedure done as soon as January. Capsule endoscopy reportedly showed an area of ulceration. He has stopped aspirin.\par \par Ferritin at our last visit was 24. More doses of IV iron in September as a result. Feels well.\par \par Hgb today over 16g.

## 2022-10-31 NOTE — RESULTS/DATA
[FreeTextEntry1] : Mr. Morocho is a pleasant 77 year-old man with significant cardiac history, previously on aspirin and xarelto (now just the latter), here for iron deficiency anemia.

## 2022-10-31 NOTE — REVIEW OF SYSTEMS
[SOB on Exertion] : shortness of breath during exertion [Fever] : no fever [Chills] : no chills [Fatigue] : no fatigue [Chest Pain] : no chest pain [Abdominal Pain] : no abdominal pain [Joint Pain] : no joint pain [Joint Stiffness] : no joint stiffness [Anxiety] : no anxiety [Depression] : no depression [Easy Bleeding] : no tendency for easy bleeding [Easy Bruising] : no tendency for easy bruising [Swollen Glands] : no swollen glands

## 2022-10-31 NOTE — PHYSICAL EXAM
[Restricted in physically strenuous activity but ambulatory and able to carry out work of a light or sedentary nature] : Status 1- Restricted in physically strenuous activity but ambulatory and able to carry out work of a light or sedentary nature, e.g., light house work, office work [Normal] : affect appropriate [de-identified] : anicteric

## 2022-10-31 NOTE — REASON FOR VISIT
[Follow-Up Visit] : a follow-up visit for [Spouse] : spouse [FreeTextEntry2] : Iron Deficiency Anemia

## 2022-11-01 LAB
FERRITIN SERPL-MCNC: 82 NG/ML
IRON SATN MFR SERPL: 49 %
IRON SERPL-MCNC: 127 UG/DL
TIBC SERPL-MCNC: 262 UG/DL
TRANSFERRIN SERPL-MCNC: 209 MG/DL
UIBC SERPL-MCNC: 135 UG/DL
VIT B12 SERPL-MCNC: 259 PG/ML

## 2022-11-08 ENCOUNTER — NON-APPOINTMENT (OUTPATIENT)
Age: 77
End: 2022-11-08

## 2022-11-08 DIAGNOSIS — Z79.899 OTHER LONG TERM (CURRENT) DRUG THERAPY: ICD-10-CM

## 2022-12-13 ENCOUNTER — APPOINTMENT (OUTPATIENT)
Age: 77
End: 2022-12-13

## 2022-12-22 ENCOUNTER — APPOINTMENT (OUTPATIENT)
Dept: HEMATOLOGY ONCOLOGY | Facility: CLINIC | Age: 77
End: 2022-12-22

## 2023-01-06 ENCOUNTER — OUTPATIENT (OUTPATIENT)
Dept: OUTPATIENT SERVICES | Facility: HOSPITAL | Age: 78
LOS: 1 days | End: 2023-01-06
Payer: MEDICARE

## 2023-01-06 VITALS
DIASTOLIC BLOOD PRESSURE: 90 MMHG | OXYGEN SATURATION: 98 % | RESPIRATION RATE: 18 BRPM | HEART RATE: 68 BPM | TEMPERATURE: 97 F | HEIGHT: 73 IN | SYSTOLIC BLOOD PRESSURE: 160 MMHG | WEIGHT: 233.69 LBS

## 2023-01-06 DIAGNOSIS — I48.91 UNSPECIFIED ATRIAL FIBRILLATION: ICD-10-CM

## 2023-01-06 DIAGNOSIS — I10 ESSENTIAL (PRIMARY) HYPERTENSION: ICD-10-CM

## 2023-01-06 DIAGNOSIS — Z29.9 ENCOUNTER FOR PROPHYLACTIC MEASURES, UNSPECIFIED: ICD-10-CM

## 2023-01-06 DIAGNOSIS — Z01.818 ENCOUNTER FOR OTHER PREPROCEDURAL EXAMINATION: ICD-10-CM

## 2023-01-06 DIAGNOSIS — G47.33 OBSTRUCTIVE SLEEP APNEA (ADULT) (PEDIATRIC): ICD-10-CM

## 2023-01-06 DIAGNOSIS — Z95.1 PRESENCE OF AORTOCORONARY BYPASS GRAFT: Chronic | ICD-10-CM

## 2023-01-06 DIAGNOSIS — I25.10 ATHEROSCLEROTIC HEART DISEASE OF NATIVE CORONARY ARTERY WITHOUT ANGINA PECTORIS: ICD-10-CM

## 2023-01-06 LAB
ANION GAP SERPL CALC-SCNC: 11 MMOL/L — SIGNIFICANT CHANGE UP (ref 5–17)
APTT BLD: 37.2 SEC — HIGH (ref 27.5–35.5)
BASOPHILS # BLD AUTO: 0.03 K/UL — SIGNIFICANT CHANGE UP (ref 0–0.2)
BASOPHILS NFR BLD AUTO: 0.6 % — SIGNIFICANT CHANGE UP (ref 0–2)
BLD GP AB SCN SERPL QL: SIGNIFICANT CHANGE UP
BUN SERPL-MCNC: 21 MG/DL — HIGH (ref 8–20)
CALCIUM SERPL-MCNC: 8.6 MG/DL — SIGNIFICANT CHANGE UP (ref 8.4–10.5)
CHLORIDE SERPL-SCNC: 109 MMOL/L — HIGH (ref 96–108)
CO2 SERPL-SCNC: 23 MMOL/L — SIGNIFICANT CHANGE UP (ref 22–29)
CREAT SERPL-MCNC: 1.23 MG/DL — SIGNIFICANT CHANGE UP (ref 0.5–1.3)
EGFR: 61 ML/MIN/1.73M2 — SIGNIFICANT CHANGE UP
EOSINOPHIL # BLD AUTO: 0.08 K/UL — SIGNIFICANT CHANGE UP (ref 0–0.5)
EOSINOPHIL NFR BLD AUTO: 1.5 % — SIGNIFICANT CHANGE UP (ref 0–6)
GLUCOSE SERPL-MCNC: 97 MG/DL — SIGNIFICANT CHANGE UP (ref 70–99)
HCT VFR BLD CALC: 50.4 % — HIGH (ref 39–50)
HGB BLD-MCNC: 16.2 G/DL — SIGNIFICANT CHANGE UP (ref 13–17)
IMM GRANULOCYTES NFR BLD AUTO: 0.2 % — SIGNIFICANT CHANGE UP (ref 0–0.9)
INR BLD: 2.51 RATIO — HIGH (ref 0.88–1.16)
LYMPHOCYTES # BLD AUTO: 1.14 K/UL — SIGNIFICANT CHANGE UP (ref 1–3.3)
LYMPHOCYTES # BLD AUTO: 21.3 % — SIGNIFICANT CHANGE UP (ref 13–44)
MAGNESIUM SERPL-MCNC: 1.9 MG/DL — SIGNIFICANT CHANGE UP (ref 1.6–2.6)
MCHC RBC-ENTMCNC: 26.3 PG — LOW (ref 27–34)
MCHC RBC-ENTMCNC: 32.1 GM/DL — SIGNIFICANT CHANGE UP (ref 32–36)
MCV RBC AUTO: 81.7 FL — SIGNIFICANT CHANGE UP (ref 80–100)
MONOCYTES # BLD AUTO: 0.72 K/UL — SIGNIFICANT CHANGE UP (ref 0–0.9)
MONOCYTES NFR BLD AUTO: 13.4 % — SIGNIFICANT CHANGE UP (ref 2–14)
NEUTROPHILS # BLD AUTO: 3.38 K/UL — SIGNIFICANT CHANGE UP (ref 1.8–7.4)
NEUTROPHILS NFR BLD AUTO: 63 % — SIGNIFICANT CHANGE UP (ref 43–77)
PLATELET # BLD AUTO: 189 K/UL — SIGNIFICANT CHANGE UP (ref 150–400)
POTASSIUM SERPL-MCNC: 4.5 MMOL/L — SIGNIFICANT CHANGE UP (ref 3.5–5.3)
POTASSIUM SERPL-SCNC: 4.5 MMOL/L — SIGNIFICANT CHANGE UP (ref 3.5–5.3)
PROTHROM AB SERPL-ACNC: 29.4 SEC — HIGH (ref 10.5–13.4)
RBC # BLD: 6.17 M/UL — HIGH (ref 4.2–5.8)
RBC # FLD: 19.2 % — HIGH (ref 10.3–14.5)
SARS-COV-2 RNA SPEC QL NAA+PROBE: SIGNIFICANT CHANGE UP
SODIUM SERPL-SCNC: 142 MMOL/L — SIGNIFICANT CHANGE UP (ref 135–145)
WBC # BLD: 5.36 K/UL — SIGNIFICANT CHANGE UP (ref 3.8–10.5)
WBC # FLD AUTO: 5.36 K/UL — SIGNIFICANT CHANGE UP (ref 3.8–10.5)

## 2023-01-06 PROCEDURE — 93010 ELECTROCARDIOGRAM REPORT: CPT

## 2023-01-06 PROCEDURE — 93005 ELECTROCARDIOGRAM TRACING: CPT

## 2023-01-06 RX ORDER — METOPROLOL TARTRATE 50 MG
1 TABLET ORAL
Qty: 0 | Refills: 0 | DISCHARGE

## 2023-01-06 NOTE — H&P PST ADULT - HISTORY OF PRESENT ILLNESS
77y Male with history of HTN, HLD, CAD s/p CABG x4 in 2010 (LIMA to LAD, SVG to OM, Diag, RPDA, RBBB/fasciular block, BPH s/p enucleation, and symptomatic **paroxysmal or persistent** atrial fibrillation.   diagnosed with AF 2/2020, s/p cardioversion 6/2020 with reoccurence 2-3  months later, s/p cardioversion April 2022 and July 2022,  reports dyspnea on exertion  denies palpitations, chest pain, dizziness, LE edema, near syncope or syncope, melena, brbpr        Male now presents in anticipation of elective radiofrequency ablation of atrial fibrillation.       Echocardiogram (date):   Stress Test (date):  Cardiac CT or MRI (date):   Cardiac Cath (date):   Cardiac surgery (date):  77y Male with history of HTN, HLD, CAD s/p CABG x4 in 2010 (LIMA to LAD, SVG to OM, Diag, RPDA, RBBB/fasciular block, anemia s/p iron transfusion, BPH s/p enucleation 2020, and symptomatic persistent atrial fibrillation. Patient was first diagnosed with AF 2/2020, s/p cardioversion 6/2020 with reoccurrence 2-3 months later, s/p cardioversion April 2022 and July 2022 without resolution of AFIB.  Patient presents to PST today with complaint of persistent reports dyspnea on exertion. He reports anemia work up was negative, however there was concern of GI bleeding from aspirin use, and aspirin was discontinued.  He continues to take Xarelto, amiodarone metoprolol and Entresto. He denies palpitations, chest pain, dizziness, LE edema, near syncope or syncope, melena, brbpr.        Male now presents in anticipation of elective radiofrequency ablation of atrial fibrillation.       Echocardiogram 7/2022 moderately reduced LV systolic function, moderate MR, moderate TR, moderate phtn   Cardiac Cath 2/2022: LIMA to LAD patent, SVG to RPDA and SVG to OM 2 patent, SVG to D1 likely occluded.   Cardiac surgery: CABG x4 2010

## 2023-01-06 NOTE — H&P PST ADULT - ASSESSMENT
CAPRINI SCORE    AGE RELATED RISK FACTORS                                                             [ ] Age 41-60 years                                            (1 Point)  [ ] Age: 61-74 years                                           (2 Points)                 [x ] Age= 75 years                                                (3 Points)             DISEASE RELATED RISK FACTORS                                                       [ ] Edema in the lower extremities                 (1 Point)                     [ ] Varicose veins                                               (1 Point)                                 [x ] BMI > 25 Kg/m2                                            (1 Point)                                  [ ] Serious infection (ie PNA)                            (1 Point)                     [ ] Lung disease ( COPD, Emphysema)            (1 Point)                                                                          [ ] Acute myocardial infarction                         (1 Point)                  [ ] Congestive heart failure (in the previous month)  (1 Point)         [ ] Inflammatory bowel disease                            (1 Point)                  [ ] Central venous access, PICC or Port               (2 points)       (within the last month)                                                                [ ] Stroke (in the previous month)                        (5 Points)    [ ] Previous or present malignancy                       (2 points)                                                                                                                                                         HEMATOLOGY RELATED FACTORS                                                         [ ] Prior episodes of VTE                                     (3 Points)                     [ ] Positive family history for VTE                      (3 Points)                  [ ] Prothrombin 76809 A                                     (3 Points)                     [ ] Factor V Leiden                                                (3 Points)                        [ ] Lupus anticoagulants                                      (3 Points)                                                           [ ] Anticardiolipin antibodies                              (3 Points)                                                       [ ] High homocysteine in the blood                   (3 Points)                                             [ ] Other congenital or acquired thrombophilia      (3 Points)                                                [ ] Heparin induced thrombocytopenia                  (3 Points)                                        MOBILITY RELATED FACTORS  [ ] Bed rest                                                         (1 Point)  [ ] Plaster cast                                                    (2 points)  [ ] Bed bound for more than 72 hours           (2 Points)    GENDER SPECIFIC FACTORS  [ ] Pregnancy or had a baby within the last month   (1 Point)  [ ] Post-partum < 6 weeks                                   (1 Point)  [ ] Hormonal therapy  or oral contraception   (1 Point)  [ ] History of pregnancy complications              (1 point)  [ ] Unexplained or recurrent              (1 Point)    OTHER RISK FACTORS                                           (1 Point)  [ ] BMI >40, smoking, diabetes requiring insulin, chemotherapy  blood transfusions and length of surgery over 2 hours    SURGERY RELATED RISK FACTORS  [ ]  Section within the last month     (1 Point)  [ ] Minor surgery                                                  (1 Point)  [ ] Arthroscopic surgery                                       (2 Points)  [x] Planned major surgery lasting more            (2 Points)      than 45 minutes     [ ] Elective hip or knee joint replacement       (5 points)       surgery                                                TRAUMA RELATED RISK FACTORS  [ ] Fracture of the hip, pelvis, or leg                       (5 Points)  [ ] Spinal cord injury resulting in paralysis             (5 points)       (in the previous month)    [ ] Paralysis  (less than 1 month)                             (5 Points)  [ ] Multiple Trauma within 1 month                        (5 Points)    Total Score [        ]    Caprini Score 0-2: Low Risk, NO VTE prophylaxis required for most patients, encourage ambulation  Caprini Score 3-6: Moderate Risk , pharmacologic VTE prophylaxis is indicated for most patients (in the absence of contraindications)  Caprini Score Greater than or =7: High risk, pharmocologic VTE prophylaxis indicated for most patients (in the absence of contraindications)      OPIOID RISK TOOL    VENTURA EACH BOX THAT APPLIES AND ADD TOTALS AT THE END    FAMILY HISTORY OF SUBSTANCE ABUSE                 FEMALE         MALE                                                Alcohol                             [  ]1 pt          [  ]3pts                                               Illegal Durgs                     [  ]2 pts        [  ]3pts                                               Rx Drugs                           [  ]4 pts        [  ]4 pts    PERSONAL HISTORY OF SUBSTANCE ABUSE                                                                                          Alcohol                             [  ]3 pts       [  ]3 pts                                               Illegal Durgs                     [  ]4 pts        [  ]4 pts                                               Rx Drugs                           [  ]5 pts        [  ]5 pts    AGE BETWEEN 16-45 YEARS                                      [  ]1 pt         [  ]1 pt    HISTORY OF PREADOLESCENT   SEXUAL ABUSE                                                             [  ]3 pts        [  ]0pts    PSYCHOLOGICAL DISEASE                     ADD, OCD, Bipolar, Schizophrenia        [  ]2 pts         [  ]2 pts                      Depression                                               [  ]1 pt           [  ]1 pt           SCORING TOTAL   0                                A score of 3 or lower indicated LOW risk for future opiod abuse  A score of 4 to 7 indicated moderate risk for future opiod abuse  A score of 8 or higher indicates a high risk for opiod abuse               CAPRINI SCORE    AGE RELATED RISK FACTORS                                                             [ ] Age 41-60 years                                            (1 Point)  [ ] Age: 61-74 years                                           (2 Points)                 [x ] Age= 75 years                                                (3 Points)             DISEASE RELATED RISK FACTORS                                                       [ ] Edema in the lower extremities                 (1 Point)                     [ ] Varicose veins                                               (1 Point)                                 [x ] BMI > 25 Kg/m2                                            (1 Point)                                  [ ] Serious infection (ie PNA)                            (1 Point)                     [ ] Lung disease ( COPD, Emphysema)            (1 Point)                                                                          [ ] Acute myocardial infarction                         (1 Point)                  [ ] Congestive heart failure (in the previous month)  (1 Point)         [ ] Inflammatory bowel disease                            (1 Point)                  [ ] Central venous access, PICC or Port               (2 points)       (within the last month)                                                                [ ] Stroke (in the previous month)                        (5 Points)    [ ] Previous or present malignancy                       (2 points)                                                                                                                                                         HEMATOLOGY RELATED FACTORS                                                         [ ] Prior episodes of VTE                                     (3 Points)                     [ ] Positive family history for VTE                      (3 Points)                  [ ] Prothrombin 03484 A                                     (3 Points)                     [ ] Factor V Leiden                                                (3 Points)                        [ ] Lupus anticoagulants                                      (3 Points)                                                           [ ] Anticardiolipin antibodies                              (3 Points)                                                       [ ] High homocysteine in the blood                   (3 Points)                                             [ ] Other congenital or acquired thrombophilia      (3 Points)                                                [ ] Heparin induced thrombocytopenia                  (3 Points)                                        MOBILITY RELATED FACTORS  [ ] Bed rest                                                         (1 Point)  [ ] Plaster cast                                                    (2 points)  [ ] Bed bound for more than 72 hours           (2 Points)    GENDER SPECIFIC FACTORS  [ ] Pregnancy or had a baby within the last month   (1 Point)  [ ] Post-partum < 6 weeks                                   (1 Point)  [ ] Hormonal therapy  or oral contraception   (1 Point)  [ ] History of pregnancy complications              (1 point)  [ ] Unexplained or recurrent              (1 Point)    OTHER RISK FACTORS                                           (1 Point)  [ ] BMI >40, smoking, diabetes requiring insulin, chemotherapy  blood transfusions and length of surgery over 2 hours    SURGERY RELATED RISK FACTORS  [ ]  Section within the last month     (1 Point)  [ ] Minor surgery                                                  (1 Point)  [ ] Arthroscopic surgery                                       (2 Points)  [x] Planned major surgery lasting more            (2 Points)      than 45 minutes     [ ] Elective hip or knee joint replacement       (5 points)       surgery                                                TRAUMA RELATED RISK FACTORS  [ ] Fracture of the hip, pelvis, or leg                       (5 Points)  [ ] Spinal cord injury resulting in paralysis             (5 points)       (in the previous month)    [ ] Paralysis  (less than 1 month)                             (5 Points)  [ ] Multiple Trauma within 1 month                        (5 Points)    Total Score [     6   ]    Caprini Score 0-2: Low Risk, NO VTE prophylaxis required for most patients, encourage ambulation  Caprini Score 3-6: Moderate Risk , pharmacologic VTE prophylaxis is indicated for most patients (in the absence of contraindications)  Caprini Score Greater than or =7: High risk, pharmocologic VTE prophylaxis indicated for most patients (in the absence of contraindications)      OPIOID RISK TOOL    VENTURA EACH BOX THAT APPLIES AND ADD TOTALS AT THE END    FAMILY HISTORY OF SUBSTANCE ABUSE                 FEMALE         MALE                                                Alcohol                             [  ]1 pt          [  ]3pts                                               Illegal Durgs                     [  ]2 pts        [  ]3pts                                               Rx Drugs                           [  ]4 pts        [  ]4 pts    PERSONAL HISTORY OF SUBSTANCE ABUSE                                                                                          Alcohol                             [  ]3 pts       [  ]3 pts                                               Illegal Durgs                     [  ]4 pts        [  ]4 pts                                               Rx Drugs                           [  ]5 pts        [  ]5 pts    AGE BETWEEN 16-45 YEARS                                      [  ]1 pt         [  ]1 pt    HISTORY OF PREADOLESCENT   SEXUAL ABUSE                                                             [  ]3 pts        [  ]0pts    PSYCHOLOGICAL DISEASE                     ADD, OCD, Bipolar, Schizophrenia        [  ]2 pts         [  ]2 pts                      Depression                                               [  ]1 pt           [  ]1 pt           SCORING TOTAL   0                                A score of 3 or lower indicated LOW risk for future opiod abuse  A score of 4 to 7 indicated moderate risk for future opiod abuse  A score of 8 or higher indicates a high risk for opiod abuse      Plan: AFIB ablation/RANDY/Carto with Dr Sherman on 1/10/23  Labs pending.   Pre-procedure instructions provided (verbal & written) as follows: Patient educated on preadmission instructions, and day of procedure medications, verbalizes understanding. Pt instructed to stop vitamins/supplements/herbal medications/NSAIDS for one week prior to surgery and discuss with PMD.   - Continue xarelto uninterrupted.    - NPO after midnight prior except meds w/ sips of water.

## 2023-01-06 NOTE — H&P PST ADULT - NSICDXFAMILYHX_GEN_ALL_CORE_FT
FAMILY HISTORY:  Father  Still living? Unknown  FH: CVA (cerebrovascular accident), Age at diagnosis: Age Unknown

## 2023-01-06 NOTE — H&P PST ADULT - MUSCULOSKELETAL
ROM intact/no joint swelling/no joint erythema/no calf tenderness/normal gait/strength 5/5 bilateral upper extremities/strength 5/5 bilateral lower extremities details…

## 2023-01-10 ENCOUNTER — TRANSCRIPTION ENCOUNTER (OUTPATIENT)
Age: 78
End: 2023-01-10

## 2023-01-10 ENCOUNTER — OUTPATIENT (OUTPATIENT)
Dept: OUTPATIENT SERVICES | Facility: HOSPITAL | Age: 78
LOS: 1 days | End: 2023-01-10
Payer: MEDICARE

## 2023-01-10 DIAGNOSIS — Z95.1 PRESENCE OF AORTOCORONARY BYPASS GRAFT: Chronic | ICD-10-CM

## 2023-01-10 RX ORDER — ATORVASTATIN CALCIUM 80 MG/1
1 TABLET, FILM COATED ORAL
Qty: 0 | Refills: 0 | DISCHARGE

## 2023-01-10 RX ORDER — FAMOTIDINE 10 MG/ML
1 INJECTION INTRAVENOUS
Qty: 0 | Refills: 0 | DISCHARGE

## 2023-01-10 RX ORDER — OXYBUTYNIN CHLORIDE 5 MG
1 TABLET ORAL
Qty: 0 | Refills: 0 | DISCHARGE

## 2023-01-10 RX ORDER — RIVAROXABAN 15 MG-20MG
1 KIT ORAL
Qty: 0 | Refills: 0 | DISCHARGE

## 2023-01-10 RX ORDER — METOPROLOL TARTRATE 50 MG
1 TABLET ORAL
Qty: 0 | Refills: 0 | DISCHARGE

## 2023-01-10 RX ORDER — SACUBITRIL AND VALSARTAN 24; 26 MG/1; MG/1
1 TABLET, FILM COATED ORAL
Qty: 0 | Refills: 0 | DISCHARGE

## 2023-01-11 ENCOUNTER — TRANSCRIPTION ENCOUNTER (OUTPATIENT)
Age: 78
End: 2023-01-11

## 2023-01-11 ENCOUNTER — OUTPATIENT (OUTPATIENT)
Dept: OUTPATIENT SERVICES | Facility: HOSPITAL | Age: 78
LOS: 1 days | End: 2023-01-11
Payer: MEDICARE

## 2023-01-11 DIAGNOSIS — C50.921 MALIGNANT NEOPLASM OF UNSPECIFIED SITE OF RIGHT MALE BREAST: ICD-10-CM

## 2023-01-11 DIAGNOSIS — Z95.1 PRESENCE OF AORTOCORONARY BYPASS GRAFT: Chronic | ICD-10-CM

## 2023-01-11 PROCEDURE — 93320 DOPPLER ECHO COMPLETE: CPT

## 2023-01-11 PROCEDURE — C1732: CPT

## 2023-01-11 PROCEDURE — 36415 COLL VENOUS BLD VENIPUNCTURE: CPT

## 2023-01-11 PROCEDURE — 93005 ELECTROCARDIOGRAM TRACING: CPT

## 2023-01-11 PROCEDURE — C1894: CPT

## 2023-01-11 PROCEDURE — 83735 ASSAY OF MAGNESIUM: CPT

## 2023-01-11 PROCEDURE — 93655 ICAR CATH ABLTJ DSCRT ARRHYT: CPT

## 2023-01-11 PROCEDURE — 80048 BASIC METABOLIC PNL TOTAL CA: CPT

## 2023-01-11 PROCEDURE — C1889: CPT

## 2023-01-11 PROCEDURE — C1759: CPT

## 2023-01-11 PROCEDURE — 93325 DOPPLER ECHO COLOR FLOW MAPG: CPT

## 2023-01-11 PROCEDURE — 93312 ECHO TRANSESOPHAGEAL: CPT

## 2023-01-11 PROCEDURE — 85027 COMPLETE CBC AUTOMATED: CPT

## 2023-01-11 PROCEDURE — C1766: CPT

## 2023-01-11 PROCEDURE — C1731: CPT

## 2023-01-11 PROCEDURE — 93656 COMPRE EP EVAL ABLTJ ATR FIB: CPT

## 2023-01-11 RX ORDER — AMIODARONE HYDROCHLORIDE 400 MG/1
1 TABLET ORAL
Qty: 0 | Refills: 0 | DISCHARGE

## 2023-01-13 ENCOUNTER — RESULT REVIEW (OUTPATIENT)
Age: 78
End: 2023-01-13

## 2023-01-13 ENCOUNTER — APPOINTMENT (OUTPATIENT)
Age: 78
End: 2023-01-13
Payer: MEDICARE

## 2023-01-13 VITALS
HEART RATE: 56 BPM | OXYGEN SATURATION: 97 % | TEMPERATURE: 97.2 F | WEIGHT: 233.6 LBS | RESPIRATION RATE: 16 BRPM | SYSTOLIC BLOOD PRESSURE: 167 MMHG | BODY MASS INDEX: 30.82 KG/M2 | DIASTOLIC BLOOD PRESSURE: 87 MMHG

## 2023-01-13 LAB
BASOPHILS # BLD AUTO: 0.03 K/UL — SIGNIFICANT CHANGE UP (ref 0–0.2)
BASOPHILS NFR BLD AUTO: 0.5 % — SIGNIFICANT CHANGE UP (ref 0–2)
EOSINOPHIL # BLD AUTO: 0.32 K/UL — SIGNIFICANT CHANGE UP (ref 0–0.5)
EOSINOPHIL NFR BLD AUTO: 5.3 % — SIGNIFICANT CHANGE UP (ref 0–6)
HCT VFR BLD CALC: 48.7 % — SIGNIFICANT CHANGE UP (ref 39–50)
HGB BLD-MCNC: 15.8 G/DL — SIGNIFICANT CHANGE UP (ref 13–17)
IMM GRANULOCYTES NFR BLD AUTO: 0.5 % — SIGNIFICANT CHANGE UP (ref 0–0.9)
LYMPHOCYTES # BLD AUTO: 1.51 K/UL — SIGNIFICANT CHANGE UP (ref 1–3.3)
LYMPHOCYTES # BLD AUTO: 25.1 % — SIGNIFICANT CHANGE UP (ref 13–44)
MCHC RBC-ENTMCNC: 26.9 PG — LOW (ref 27–34)
MCHC RBC-ENTMCNC: 32.4 GM/DL — SIGNIFICANT CHANGE UP (ref 32–36)
MCV RBC AUTO: 83 FL — SIGNIFICANT CHANGE UP (ref 80–100)
MONOCYTES # BLD AUTO: 1 K/UL — HIGH (ref 0–0.9)
MONOCYTES NFR BLD AUTO: 16.6 % — HIGH (ref 2–14)
NEUTROPHILS # BLD AUTO: 3.12 K/UL — SIGNIFICANT CHANGE UP (ref 1.8–7.4)
NEUTROPHILS NFR BLD AUTO: 52 % — SIGNIFICANT CHANGE UP (ref 43–77)
NRBC # BLD: 0 /100 WBCS — SIGNIFICANT CHANGE UP (ref 0–0)
PLATELET # BLD AUTO: 170 K/UL — SIGNIFICANT CHANGE UP (ref 150–400)
RBC # BLD: 5.87 M/UL — HIGH (ref 4.2–5.8)
RBC # FLD: 16.8 % — HIGH (ref 10.3–14.5)
WBC # BLD: 6.01 K/UL — SIGNIFICANT CHANGE UP (ref 3.8–10.5)
WBC # FLD AUTO: 6.01 K/UL — SIGNIFICANT CHANGE UP (ref 3.8–10.5)

## 2023-01-13 PROCEDURE — 99213 OFFICE O/P EST LOW 20 MIN: CPT

## 2023-01-13 PROCEDURE — 85027 COMPLETE CBC AUTOMATED: CPT

## 2023-01-17 ENCOUNTER — APPOINTMENT (OUTPATIENT)
Age: 78
End: 2023-01-17

## 2023-01-19 DIAGNOSIS — I48.91 UNSPECIFIED ATRIAL FIBRILLATION: ICD-10-CM

## 2023-01-19 LAB
ALBUMIN SERPL ELPH-MCNC: 4 G/DL
ALP BLD-CCNC: 102 U/L
ALT SERPL-CCNC: 18 U/L
ANION GAP SERPL CALC-SCNC: 11 MMOL/L
AST SERPL-CCNC: 21 U/L
BILIRUB SERPL-MCNC: 1.3 MG/DL
BUN SERPL-MCNC: 22 MG/DL
CALCIUM SERPL-MCNC: 8.9 MG/DL
CHLORIDE SERPL-SCNC: 101 MMOL/L
CO2 SERPL-SCNC: 28 MMOL/L
CREAT SERPL-MCNC: 1.34 MG/DL
EGFR: 55 ML/MIN/1.73M2
FERRITIN SERPL-MCNC: 150 NG/ML
GLUCOSE SERPL-MCNC: 102 MG/DL
IRON SATN MFR SERPL: 23 %
IRON SERPL-MCNC: 65 UG/DL
POTASSIUM SERPL-SCNC: 4.4 MMOL/L
PROT SERPL-MCNC: 7 G/DL
SODIUM SERPL-SCNC: 140 MMOL/L
TIBC SERPL-MCNC: 279 UG/DL
TRANSFERRIN SERPL-MCNC: 208 MG/DL
UIBC SERPL-MCNC: 213 UG/DL

## 2023-02-13 ENCOUNTER — APPOINTMENT (OUTPATIENT)
Dept: OPHTHALMOLOGY | Facility: CLINIC | Age: 78
End: 2023-02-13
Payer: MEDICARE

## 2023-02-13 ENCOUNTER — NON-APPOINTMENT (OUTPATIENT)
Age: 78
End: 2023-02-13

## 2023-02-13 PROCEDURE — 99203 OFFICE O/P NEW LOW 30 MIN: CPT

## 2023-03-02 ENCOUNTER — APPOINTMENT (OUTPATIENT)
Dept: ELECTROPHYSIOLOGY | Facility: CLINIC | Age: 78
End: 2023-03-02
Payer: MEDICARE

## 2023-03-02 ENCOUNTER — APPOINTMENT (OUTPATIENT)
Dept: OPHTHALMOLOGY | Facility: CLINIC | Age: 78
End: 2023-03-02

## 2023-03-02 ENCOUNTER — NON-APPOINTMENT (OUTPATIENT)
Age: 78
End: 2023-03-02

## 2023-03-02 VITALS
HEIGHT: 73 IN | TEMPERATURE: 98.7 F | DIASTOLIC BLOOD PRESSURE: 72 MMHG | WEIGHT: 235 LBS | BODY MASS INDEX: 31.14 KG/M2 | HEART RATE: 60 BPM | OXYGEN SATURATION: 96 % | SYSTOLIC BLOOD PRESSURE: 134 MMHG

## 2023-03-02 PROCEDURE — 93000 ELECTROCARDIOGRAM COMPLETE: CPT

## 2023-03-02 PROCEDURE — 99214 OFFICE O/P EST MOD 30 MIN: CPT

## 2023-03-02 RX ORDER — AMIODARONE HYDROCHLORIDE 200 MG/1
200 TABLET ORAL DAILY
Qty: 90 | Refills: 0 | Status: DISCONTINUED | COMMUNITY
Start: 2022-06-01 | End: 2023-03-02

## 2023-03-02 NOTE — DISCUSSION/SUMMARY
[EKG obtained to assist in diagnosis and management of assessed problem(s)] : EKG obtained to assist in diagnosis and management of assessed problem(s) [FreeTextEntry1] : 77 year old gentleman with history of HTN, HLD, CAD s/p CABG, bifascicular block, and symptomatic drug refractory long-standing persistent AF with associated cardiomyopathy (LVEF 30-35%) now s/p AF ablation who presents for post ablation follow-up. Amiodarone was discontinued post procedure. Maintaining Sinus rhythm and reports significant symptomatic improvement since ablation with decreased PAULA and improved energy levels. Overall doing well and denies CP, SOB, PAULA, dizziness, palpitations, syncope or presyncope.\par \par - Doing well and maintaining SR off ATT\par - Continue a/c with Xarelto.\par - Decrease Metoprolol to 50mg daily in the setting of bradycardia, although asymptomatic\par - RANDY at time of ablation (presented in AF) showed LVEF 30-35%. On GDMT for LV dysfunction. Recommend TTE at 3 months post ablation to confirm LV function improved. \par - Discussed long term monitoring for AF recurrence including ILR vs noninvasive. GIven h/o AF associated CM, may consider ILR but expressed consider about prior sternotomy and known sternal click. Will start with 1 week MCOT at 3 months post ablation and consider ILR at next f/up with Dr. Sherman\par \par Danyelle Quinn PAC\par

## 2023-03-02 NOTE — HISTORY OF PRESENT ILLNESS
[FreeTextEntry1] : 77 year old gentleman with history of HTN, HLD, CAD s/p CABG, bifascicular block, and symptomatic long-standing persistent AF with associated cardiomyopathy now AF ablation who presents for post ablation follow-up. He was initially found to have AF in 2/2020 and has had progressive dyspnea and decreased exercise tolerance in this setting. He has undergone prior cardioversion 6/2020 and again in 4/2022, but had recurrent AF. He was started on amiodarone and again underwent DCCV in 7/2022, but had recurrent AF several weeks later. He has noted symptomatic improvement in sinus rhythm. Last TTE did note mild LV dysfunction in the setting of AF. He has had anemia and suspected GI bleeding, but has been stable and has been cleared to remain on anticoagulation. \par Was in AF upon presentation for AF ablation. RANDY showed moderate to severe LV dysfunction EF 35% and no intracardiac thrombus. \par \par Presents for post ablation follow-up. Amiodarone was discontinued post procedure. Significant symptomatic improvement since ablation with decreased PAULA and improved energy levels.

## 2023-03-02 NOTE — PHYSICAL EXAM
[Well Developed] : well developed [No Acute Distress] : no acute distress [No Respiratory Distress] : no respiratory distress  [Normal Gait] : normal gait [No Edema] : no edema [No Rash] : no rash [Moves all extremities] : moves all extremities [Alert and Oriented] : alert and oriented

## 2023-04-13 ENCOUNTER — OUTPATIENT (OUTPATIENT)
Dept: OUTPATIENT SERVICES | Facility: HOSPITAL | Age: 78
LOS: 1 days | End: 2023-04-13
Payer: MEDICARE

## 2023-04-13 DIAGNOSIS — C50.921 MALIGNANT NEOPLASM OF UNSPECIFIED SITE OF RIGHT MALE BREAST: ICD-10-CM

## 2023-04-13 DIAGNOSIS — Z95.1 PRESENCE OF AORTOCORONARY BYPASS GRAFT: Chronic | ICD-10-CM

## 2023-04-13 NOTE — HISTORY OF PRESENT ILLNESS
[de-identified] : Mr. Morocho has been following with his cardiologist for atrial fibrillation, a new diagnosis.  He has extensive coronary artery disease history, but had been noticing worsening fatigue while exercising, with associated shortness of breath and dyspnea on exertion with regular activities.\par \par After a cardioversion for his atrial fibrillation, he felt some relief.  However, when the fatigue and other symptoms persisted, he went back to his cardiologist.  There, he was found to have a significant drop in his hemoglobin.  On my review of his labs, in February, 2022, he had a hemoglobin of 13.6 g.\par \par Labs drawn on July 13, revealed a hemoglobin of 8.7 g.  MCV 74.6.  RDW 16%.  There were no other CBC abnormalities.  He underwent EGD and colonoscopy with Dr. Estevez, who found no evidence of bleeding.  He is pending capsule endoscopy.\par \par Ferritin checked at a later visit resulted at 8, percent saturation 4. He states that he is not a red meat eater.\par \par When questioned, Ye states that he has not a few weeks of dark stools in May, but did not make much of it.  He has been on aspirin and xarelto, the latter of which was initially held, then restarted at a lower dose 15 mg.  His stools are now of normal coloration.\par \par 9/22 - Received IV iron at the end of July and early August of this year.  Tolerated well without any toxicity. Initially felt very well, went on vacation to [place]. [de-identified] : S/P cardioversion this week. He is feeling well. Improved fatigue and no SOB/PAULA. Denies dark stools, hematuria or blood per rectum. Capsule endoscopy reportedly showed an area of ulceration. He has stopped aspirin.\par \par Ferritin at our last visit was up to 82. Hgb today is 15.8g.

## 2023-04-13 NOTE — PHYSICAL EXAM
[Restricted in physically strenuous activity but ambulatory and able to carry out work of a light or sedentary nature] : Status 1- Restricted in physically strenuous activity but ambulatory and able to carry out work of a light or sedentary nature, e.g., light house work, office work [Normal] : affect appropriate [de-identified] : anicteric [de-identified] : Gr 1/6 ROLO, RRR

## 2023-04-13 NOTE — RESULTS/DATA
[FreeTextEntry1] : Mr. Morocho is a pleasant 77 year-old man with significant cardiac history, previously on aspirin and xarelto (now just the latter), here for iron deficiency anemia. Patient being seen as per physician's primary plan of care.\par

## 2023-04-13 NOTE — REVIEW OF SYSTEMS
[Fever] : no fever [Chills] : no chills [Fatigue] : no fatigue [Chest Pain] : no chest pain [Palpitations] : no palpitations [Shortness Of Breath] : no shortness of breath [Wheezing] : no wheezing [SOB on Exertion] : no shortness of breath during exertion [Abdominal Pain] : no abdominal pain [Vomiting] : no vomiting [Constipation] : no constipation [Diarrhea] : no diarrhea [Joint Pain] : no joint pain [Joint Stiffness] : no joint stiffness [Dizziness] : no dizziness [Difficulty Walking] : no difficulty walking [Anxiety] : no anxiety [Depression] : no depression [Muscle Weakness] : no muscle weakness [Easy Bleeding] : no tendency for easy bleeding [Easy Bruising] : no tendency for easy bruising [Swollen Glands] : no swollen glands [FreeTextEntry5] : occ. RLE edema from prior SVG for CABG

## 2023-04-17 ENCOUNTER — APPOINTMENT (OUTPATIENT)
Age: 78
End: 2023-04-17
Payer: MEDICARE

## 2023-04-17 ENCOUNTER — NON-APPOINTMENT (OUTPATIENT)
Age: 78
End: 2023-04-17

## 2023-05-06 NOTE — HISTORY OF PRESENT ILLNESS
[de-identified] : Mr. Morocho has been following with his cardiologist for atrial fibrillation, a new diagnosis.  He has extensive coronary artery disease history, but had been noticing worsening fatigue while exercising, with associated shortness of breath and dyspnea on exertion with regular activities.\par \par After a cardioversion for his atrial fibrillation, he felt some relief.  However, when the fatigue and other symptoms persisted, he went back to his cardiologist.  There, he was found to have a significant drop in his hemoglobin.  On my review of his labs, in February, 2022, he had a hemoglobin of 13.6 g.\par \par Labs drawn on July 13, revealed a hemoglobin of 8.7 g.  MCV 74.6.  RDW 16%.  There were no other CBC abnormalities.  He underwent EGD and colonoscopy with Dr. Estevez, who found no evidence of bleeding.  He is pending capsule endoscopy.\par \par Ferritin checked at a later visit resulted at 8, percent saturation 4. He states that he is not a red meat eater.\par \par When questioned, Ye states that he has not a few weeks of dark stools in May, but did not make much of it.  He has been on aspirin and xarelto, the latter of which was initially held, then restarted at a lower dose 15 mg.  His stools are now of normal coloration.\par \par 9/22 - Received IV iron at the end of July and early August of this year.  Tolerated well without any toxicity. Initially felt very well, went on vacation to [place]. [de-identified] : S/P cardioversion this week. He is feeling well. Improved fatigue and no SOB/PAULA. Denies dark stools, hematuria or blood per rectum. Capsule endoscopy reportedly showed an area of ulceration. He has stopped aspirin.\par \par Ferritin at our last visit was up to 82. Hgb today is 15.8g.

## 2023-05-08 ENCOUNTER — APPOINTMENT (OUTPATIENT)
Age: 78
End: 2023-05-08
Payer: MEDICARE

## 2023-05-08 ENCOUNTER — NON-APPOINTMENT (OUTPATIENT)
Age: 78
End: 2023-05-08

## 2023-05-09 ENCOUNTER — RX RENEWAL (OUTPATIENT)
Age: 78
End: 2023-05-09

## 2023-05-18 ENCOUNTER — NON-APPOINTMENT (OUTPATIENT)
Age: 78
End: 2023-05-18

## 2023-05-18 ENCOUNTER — APPOINTMENT (OUTPATIENT)
Dept: ELECTROPHYSIOLOGY | Facility: CLINIC | Age: 78
End: 2023-05-18
Payer: MEDICARE

## 2023-05-18 VITALS
BODY MASS INDEX: 31.54 KG/M2 | OXYGEN SATURATION: 96 % | DIASTOLIC BLOOD PRESSURE: 80 MMHG | HEART RATE: 60 BPM | SYSTOLIC BLOOD PRESSURE: 148 MMHG | WEIGHT: 238 LBS | HEIGHT: 73 IN

## 2023-05-18 DIAGNOSIS — Z98.890 OTHER SPECIFIED POSTPROCEDURAL STATES: ICD-10-CM

## 2023-05-18 DIAGNOSIS — Z86.79 OTHER SPECIFIED POSTPROCEDURAL STATES: ICD-10-CM

## 2023-05-18 PROCEDURE — 93000 ELECTROCARDIOGRAM COMPLETE: CPT | Mod: 59

## 2023-05-18 PROCEDURE — 99214 OFFICE O/P EST MOD 30 MIN: CPT

## 2023-05-18 PROCEDURE — 93242 EXT ECG>48HR<7D RECORDING: CPT

## 2023-05-18 NOTE — PHYSICAL EXAM
[No Respiratory Distress] : no respiratory distress  [No Edema] : no edema [Moves all extremities] : moves all extremities [Alert and Oriented] : alert and oriented

## 2023-05-18 NOTE — ADDENDUM
[FreeTextEntry1] : I was present for the above evaluation and agree with the history, physical exam, assessment and plan as above.  Pt doing very well following Ablation of persistent AF with associated CMP, with symptomatic resolution and in sinus rhythm today. Will repeat monitorign and TTE to reassess as above. \par He will remain on anticoagulation for now. If bleeding concerns on long-term anticoagulation can consider CORBIN occlusion in the future.

## 2023-05-18 NOTE — HISTORY OF PRESENT ILLNESS
[FreeTextEntry1] : 78 year old gentleman with history of HTN, HLD, CAD s/p CABG, bifascicular block, and symptomatic long-standing persistent AF with associated cardiomyopathy now AF ablation who presents for post ablation follow-up. He was initially found to have AF in 2/2020 and has had progressive dyspnea and decreased exercise tolerance in this setting. He has undergone prior cardioversion 6/2020 and again in 4/2022, but had recurrent AF. He was started on amiodarone and again underwent DCCV in 7/2022, but had recurrent AF several weeks later. He has noted symptomatic improvement in sinus rhythm. Last TTE did note mild LV dysfunction in the setting of AF. He has had anemia and suspected GI bleeding, but has been stable and has been cleared to remain on anticoagulation. \par Was in AF upon presentation for AF ablation. RANDY showed moderate to severe LV dysfunction EF 35% and no intracardiac thrombus. \par \par Underwent ablation for persistent AF ( PVI, posterior wall isolation, focal ablation also performed at regions of scar/fractionation around the anterior mitral annulus, CTI line) on 1/10/23. \par \par Today, reports he has been feeling very well with significant symptomatic improvement post procedure. PAULA resolved, more exercise tolerance. ECG reveals SR with bifascicular block c/w history. Has not yet worn external monitoring however utilizing [employer] ricardo at home which has revealed no abnormal readings. \par \par Tolerating Xarelto without c/o easy bruising, bleeding, or falls. Following with hematology for anemia which has been stable per patient with no iron infusions in approximately 6 months.

## 2023-05-18 NOTE — DISCUSSION/SUMMARY
[EKG obtained to assist in diagnosis and management of assessed problem(s)] : EKG obtained to assist in diagnosis and management of assessed problem(s) [FreeTextEntry1] : 78 year old gentleman with history of HTN, HLD, CAD s/p CABG, bifascicular block, and symptomatic long-standing persistent AF with associated cardiomyopathy now AF ablation who presents for post ablation follow-up. He was initially found to have AF in 2/2020 and has had progressive dyspnea and decreased exercise tolerance in this setting. He has undergone prior cardioversion 6/2020 and again in 4/2022, but had recurrent AF. He was started on amiodarone and again underwent DCCV in 7/2022, but had recurrent AF several weeks later. He has noted symptomatic improvement in sinus rhythm. Last TTE did note mild LV dysfunction in the setting of AF. He has had anemia and suspected GI bleeding, but has been stable and has been cleared to remain on anticoagulation. \par Was in AF upon presentation for AF ablation. RANDY showed moderate to severe LV dysfunction EF 35% and no intracardiac thrombus. \par \par Underwent ablation for persistent AF ( PVI, posterior wall isolation, focal ablation also performed at regions of scar/fractionation around the anterior mitral annulus, CTI line) on 1/10/23. \par \par Today, reports he has been feeling very well with significant symptomatic improvement post procedure. PAULA resolved, more exercise tolerance. ECG reveals SR with bifascicular block c/w history. Has not yet worn external monitoring however utilizing [email or web address] ricardo at home which has revealed no abnormal readings. \par \par Tolerating Xarelto without c/o easy bruising, bleeding, or falls. Following with hematology for anemia which has been stable per patient with no iron infusions in approximately 6 months. \par \par Recommendation:\par \par Mr. Morocho has been doing very well s/p ablation for persistent AF 1/10/23 reporting significant symptomatic improvement in office today. Will place 1 week extended holter in office for arrhythmia surveillance today. Repeat echo to reassess EF in SR (prefers to have arranged at Dr. Drake office. Briefly discussed if EF remained severely depressed the need for f/u to discuss device therapy. [id number] , to continue Xarelto for stroke prophylaxis. If progressive anemia noted or concern for bleeding in future we discussed LAAO as alternative. \par -EP follow up in 6 months or sooner PRN.\par \par Chantale Bradshaw ANP-C \par \par

## 2023-05-18 NOTE — REVIEW OF SYSTEMS
[Feeling Fatigued] : not feeling fatigued [Dyspnea on exertion] : not dyspnea during exertion [Chest Discomfort] : no chest discomfort [Palpitations] : no palpitations [Dizziness] : no dizziness [Easy Bleeding] : no tendency for easy bleeding

## 2023-06-09 ENCOUNTER — RESULT REVIEW (OUTPATIENT)
Age: 78
End: 2023-06-09

## 2023-06-09 ENCOUNTER — APPOINTMENT (OUTPATIENT)
Age: 78
End: 2023-06-09
Payer: MEDICARE

## 2023-06-09 VITALS
TEMPERATURE: 97.3 F | WEIGHT: 238 LBS | DIASTOLIC BLOOD PRESSURE: 91 MMHG | SYSTOLIC BLOOD PRESSURE: 165 MMHG | OXYGEN SATURATION: 96 % | BODY MASS INDEX: 31.54 KG/M2 | HEIGHT: 73 IN | HEART RATE: 62 BPM

## 2023-06-09 DIAGNOSIS — D50.9 IRON DEFICIENCY ANEMIA, UNSPECIFIED: ICD-10-CM

## 2023-06-09 LAB
BASOPHILS # BLD AUTO: 0.05 K/UL — SIGNIFICANT CHANGE UP (ref 0–0.2)
BASOPHILS NFR BLD AUTO: 0.8 % — SIGNIFICANT CHANGE UP (ref 0–2)
EOSINOPHIL # BLD AUTO: 0.12 K/UL — SIGNIFICANT CHANGE UP (ref 0–0.5)
EOSINOPHIL NFR BLD AUTO: 1.8 % — SIGNIFICANT CHANGE UP (ref 0–6)
HCT VFR BLD CALC: 49.8 % — SIGNIFICANT CHANGE UP (ref 39–50)
HGB BLD-MCNC: 15.9 G/DL — SIGNIFICANT CHANGE UP (ref 13–17)
IMM GRANULOCYTES NFR BLD AUTO: 0.2 % — SIGNIFICANT CHANGE UP (ref 0–0.9)
LYMPHOCYTES # BLD AUTO: 1.71 K/UL — SIGNIFICANT CHANGE UP (ref 1–3.3)
LYMPHOCYTES # BLD AUTO: 25.9 % — SIGNIFICANT CHANGE UP (ref 13–44)
MCHC RBC-ENTMCNC: 27.2 PG — SIGNIFICANT CHANGE UP (ref 27–34)
MCHC RBC-ENTMCNC: 31.9 GM/DL — LOW (ref 32–36)
MCV RBC AUTO: 85.1 FL — SIGNIFICANT CHANGE UP (ref 80–100)
MONOCYTES # BLD AUTO: 0.85 K/UL — SIGNIFICANT CHANGE UP (ref 0–0.9)
MONOCYTES NFR BLD AUTO: 12.9 % — SIGNIFICANT CHANGE UP (ref 2–14)
NEUTROPHILS # BLD AUTO: 3.87 K/UL — SIGNIFICANT CHANGE UP (ref 1.8–7.4)
NEUTROPHILS NFR BLD AUTO: 58.4 % — SIGNIFICANT CHANGE UP (ref 43–77)
NRBC # BLD: 0 /100 WBCS — SIGNIFICANT CHANGE UP (ref 0–0)
PLATELET # BLD AUTO: 203 K/UL — SIGNIFICANT CHANGE UP (ref 150–400)
RBC # BLD: 5.85 M/UL — HIGH (ref 4.2–5.8)
RBC # FLD: 13.9 % — SIGNIFICANT CHANGE UP (ref 10.3–14.5)
WBC # BLD: 6.61 K/UL — SIGNIFICANT CHANGE UP (ref 3.8–10.5)
WBC # FLD AUTO: 6.61 K/UL — SIGNIFICANT CHANGE UP (ref 3.8–10.5)

## 2023-06-09 PROCEDURE — 99213 OFFICE O/P EST LOW 20 MIN: CPT

## 2023-06-09 PROCEDURE — 85027 COMPLETE CBC AUTOMATED: CPT

## 2023-06-09 RX ORDER — SUCRALFATE 1 G/1
1 TABLET ORAL
Qty: 28 | Refills: 0 | Status: DISCONTINUED | COMMUNITY
Start: 2023-01-13 | End: 2023-06-09

## 2023-06-09 RX ORDER — FAMOTIDINE 40 MG/1
40 TABLET, FILM COATED ORAL TWICE DAILY
Refills: 0 | Status: DISCONTINUED | COMMUNITY
End: 2023-06-09

## 2023-06-12 ENCOUNTER — APPOINTMENT (OUTPATIENT)
Dept: CARDIOLOGY | Facility: CLINIC | Age: 78
End: 2023-06-12
Payer: MEDICARE

## 2023-06-12 VITALS
HEART RATE: 64 BPM | WEIGHT: 235 LBS | HEIGHT: 73 IN | OXYGEN SATURATION: 97 % | BODY MASS INDEX: 31.14 KG/M2 | DIASTOLIC BLOOD PRESSURE: 86 MMHG | SYSTOLIC BLOOD PRESSURE: 156 MMHG

## 2023-06-12 LAB
ALBUMIN SERPL ELPH-MCNC: 4.1 G/DL
ALP BLD-CCNC: 112 U/L
ALT SERPL-CCNC: 15 U/L
ANION GAP SERPL CALC-SCNC: 14 MMOL/L
AST SERPL-CCNC: 16 U/L
BILIRUB SERPL-MCNC: 0.9 MG/DL
BUN SERPL-MCNC: 21 MG/DL
CALCIUM SERPL-MCNC: 9.1 MG/DL
CHLORIDE SERPL-SCNC: 106 MMOL/L
CO2 SERPL-SCNC: 22 MMOL/L
CREAT SERPL-MCNC: 1.27 MG/DL
EGFR: 59 ML/MIN/1.73M2
FERRITIN SERPL-MCNC: 57 NG/ML
FOLATE SERPL-MCNC: 9.9 NG/ML
GLUCOSE SERPL-MCNC: 90 MG/DL
IRON SATN MFR SERPL: 27 %
IRON SERPL-MCNC: 71 UG/DL
POTASSIUM SERPL-SCNC: 5.1 MMOL/L
PROT SERPL-MCNC: 6.9 G/DL
SODIUM SERPL-SCNC: 143 MMOL/L
TIBC SERPL-MCNC: 265 UG/DL
TRANSFERRIN SERPL-MCNC: 221 MG/DL
UIBC SERPL-MCNC: 193 UG/DL
VIT B12 SERPL-MCNC: 248 PG/ML

## 2023-06-12 PROCEDURE — 99214 OFFICE O/P EST MOD 30 MIN: CPT

## 2023-06-12 RX ORDER — FUROSEMIDE 40 MG/1
40 TABLET ORAL DAILY
Qty: 90 | Refills: 1 | Status: DISCONTINUED | COMMUNITY
Start: 2021-10-04 | End: 2023-06-12

## 2023-06-12 RX ORDER — FUROSEMIDE 40 MG/1
40 TABLET ORAL DAILY
Refills: 0 | Status: ACTIVE | COMMUNITY

## 2023-07-21 PROBLEM — D50.9 ANEMIA, IRON DEFICIENCY: Status: ACTIVE | Noted: 2020-04-23

## 2023-07-21 NOTE — RESULTS/DATA
[FreeTextEntry1] : Mr. Morocho is a pleasant 78 year-old man with significant cardiac history, previously on aspirin and xarelto (now just the latter), here for iron deficiency anemia.

## 2023-07-21 NOTE — PHYSICAL EXAM
[Restricted in physically strenuous activity but ambulatory and able to carry out work of a light or sedentary nature] : Status 1- Restricted in physically strenuous activity but ambulatory and able to carry out work of a light or sedentary nature, e.g., light house work, office work [Normal] : affect appropriate [de-identified] : anicteric [de-identified] : Gr 1/6 ROLO, RRR

## 2023-07-21 NOTE — HISTORY OF PRESENT ILLNESS
[de-identified] : Mr. Morocho has been following with his cardiologist for atrial fibrillation, a new diagnosis.  He has extensive coronary artery disease history, but had been noticing worsening fatigue while exercising, with associated shortness of breath and dyspnea on exertion with regular activities.\par \par After a cardioversion for his atrial fibrillation, he felt some relief.  However, when the fatigue and other symptoms persisted, he went back to his cardiologist.  There, he was found to have a significant drop in his hemoglobin.  On my review of his labs, in February, 2022, he had a hemoglobin of 13.6 g.\par \par Labs drawn on July 13, revealed a hemoglobin of 8.7 g.  MCV 74.6.  RDW 16%.  There were no other CBC abnormalities.  He underwent EGD and colonoscopy with Dr. Estevez, who found no evidence of bleeding.  He is pending capsule endoscopy.\par \par Ferritin checked at a later visit resulted at 8, percent saturation 4. He states that he is not a red meat eater.\par \par When questioned, Ye states that he has not a few weeks of dark stools in May, but did not make much of it.  He has been on aspirin and xarelto, the latter of which was initially held, then restarted at a lower dose 15 mg.  His stools are now of normal coloration.\par \par 9/22 - Received IV iron at the end of July and early August of this year.  Tolerated well without any toxicity. Initially felt very well, went on vacation to [place]. [de-identified] : Remains off of aspirin, taking xarelto. Hgb stable. He denies any ongoing fevers or chills, no headache, no lumps or bumps, no weight loss, no bleeding or bruising.\par

## 2023-07-21 NOTE — REVIEW OF SYSTEMS
[Fever] : no fever [Chills] : no chills [Fatigue] : no fatigue [Chest Pain] : no chest pain [Palpitations] : no palpitations [Shortness Of Breath] : no shortness of breath [Wheezing] : no wheezing [SOB on Exertion] : no shortness of breath during exertion [Abdominal Pain] : no abdominal pain [Vomiting] : no vomiting [Constipation] : no constipation [Joint Pain] : no joint pain [Joint Stiffness] : no joint stiffness [Dizziness] : no dizziness [Difficulty Walking] : no difficulty walking [Anxiety] : no anxiety [Depression] : no depression [Muscle Weakness] : no muscle weakness [Easy Bleeding] : no tendency for easy bleeding [Easy Bruising] : no tendency for easy bruising [Swollen Glands] : no swollen glands

## 2023-07-24 ENCOUNTER — APPOINTMENT (OUTPATIENT)
Dept: CARDIOLOGY | Facility: CLINIC | Age: 78
End: 2023-07-24

## 2023-08-07 RX ORDER — RIVAROXABAN 20 MG/1
20 TABLET, FILM COATED ORAL DAILY
Qty: 90 | Refills: 3 | Status: ACTIVE | COMMUNITY
Start: 2022-09-27 | End: 1900-01-01

## 2023-11-30 ENCOUNTER — APPOINTMENT (OUTPATIENT)
Dept: ELECTROPHYSIOLOGY | Facility: CLINIC | Age: 78
End: 2023-11-30

## 2023-11-30 NOTE — H&P PST ADULT - HEIGHT IN CM
supervision during treatment time      Minutes:  PT Individual Minutes  Time In: 1310  Time Out: 1350  Minutes: 40  Timed Code Treatment Minutes: 0 Minutes  Procedure Minutes: 40 PT evaluation minutes     Timed Activity Minutes Units   Ther Ex 0    Manual  0        Electronically signed by Ole Gonzalez PT on 11/30/23 at 4:05 PM EST
185.42

## 2023-12-11 ENCOUNTER — APPOINTMENT (OUTPATIENT)
Dept: ELECTROPHYSIOLOGY | Facility: CLINIC | Age: 78
End: 2023-12-11

## 2023-12-15 ENCOUNTER — APPOINTMENT (OUTPATIENT)
Dept: CARDIOLOGY | Facility: CLINIC | Age: 78
End: 2023-12-15

## 2024-02-11 ENCOUNTER — RX RENEWAL (OUTPATIENT)
Age: 79
End: 2024-02-11

## 2024-02-12 RX ORDER — ATORVASTATIN CALCIUM 40 MG/1
40 TABLET, FILM COATED ORAL
Qty: 90 | Refills: 3 | Status: ACTIVE | COMMUNITY
Start: 2016-12-08 | End: 1900-01-01

## 2024-02-15 LAB — HBA1C MFR BLD HPLC: 5.4

## 2024-05-09 RX ORDER — METOPROLOL SUCCINATE 50 MG/1
50 TABLET, EXTENDED RELEASE ORAL DAILY
Refills: 0 | Status: DISCONTINUED | COMMUNITY
End: 2024-05-09

## 2024-05-09 RX ORDER — METOPROLOL SUCCINATE 50 MG/1
50 TABLET, EXTENDED RELEASE ORAL DAILY
Qty: 90 | Refills: 3 | Status: ACTIVE | COMMUNITY
Start: 2018-12-14 | End: 2025-05-04

## 2024-05-31 ENCOUNTER — NON-APPOINTMENT (OUTPATIENT)
Age: 79
End: 2024-05-31

## 2024-05-31 ENCOUNTER — APPOINTMENT (OUTPATIENT)
Dept: CARDIOLOGY | Facility: CLINIC | Age: 79
End: 2024-05-31
Payer: MEDICARE

## 2024-05-31 VITALS
HEART RATE: 65 BPM | OXYGEN SATURATION: 96 % | DIASTOLIC BLOOD PRESSURE: 80 MMHG | WEIGHT: 238 LBS | BODY MASS INDEX: 31.54 KG/M2 | SYSTOLIC BLOOD PRESSURE: 126 MMHG | HEIGHT: 73 IN

## 2024-05-31 DIAGNOSIS — I10 ESSENTIAL (PRIMARY) HYPERTENSION: ICD-10-CM

## 2024-05-31 DIAGNOSIS — I25.10 ATHEROSCLEROTIC HEART DISEASE OF NATIVE CORONARY ARTERY W/OUT ANGINA PECTORIS: ICD-10-CM

## 2024-05-31 DIAGNOSIS — I48.19 OTHER PERSISTENT ATRIAL FIBRILLATION: ICD-10-CM

## 2024-05-31 DIAGNOSIS — I50.22 CHRONIC SYSTOLIC (CONGESTIVE) HEART FAILURE: ICD-10-CM

## 2024-05-31 PROCEDURE — 93000 ELECTROCARDIOGRAM COMPLETE: CPT

## 2024-05-31 PROCEDURE — 99214 OFFICE O/P EST MOD 30 MIN: CPT

## 2024-05-31 PROCEDURE — G2211 COMPLEX E/M VISIT ADD ON: CPT

## 2024-05-31 RX ORDER — SACUBITRIL AND VALSARTAN 24; 26 MG/1; MG/1
24-26 TABLET, FILM COATED ORAL TWICE DAILY
Qty: 180 | Refills: 3 | Status: ACTIVE | COMMUNITY
Start: 2022-08-08 | End: 1900-01-01

## 2024-05-31 NOTE — PHYSICAL EXAM
[Well Developed] : well developed [Well Nourished] : well nourished [No Acute Distress] : no acute distress [Normal Conjunctiva] : normal conjunctiva [Normal Venous Pressure] : normal venous pressure [No Carotid Bruit] : no carotid bruit [Normal S1, S2] : normal S1, S2 [No Rub] : no rub [No Gallop] : no gallop [Clear Lung Fields] : clear lung fields [Good Air Entry] : good air entry [No Respiratory Distress] : no respiratory distress  [Soft] : abdomen soft [Non Tender] : non-tender [No Masses/organomegaly] : no masses/organomegaly [Normal Bowel Sounds] : normal bowel sounds [Normal Gait] : normal gait [No Edema] : no edema [No Cyanosis] : no cyanosis [No Clubbing] : no clubbing [No Varicosities] : no varicosities [No Rash] : no rash [No Skin Lesions] : no skin lesions [Moves all extremities] : moves all extremities [No Focal Deficits] : no focal deficits [Normal Speech] : normal speech [Alert and Oriented] : alert and oriented [Normal memory] : normal memory [de-identified] : 1/6 systolic murmur over the second right intercostal space. [de-identified] : regular

## 2024-05-31 NOTE — DISCUSSION/SUMMARY
[Patient] : the patient [With Me] : with me [___ Month(s)] : in [unfilled] month(s) [FreeTextEntry1] : 79M w/ PMH as above presenting for routine follow up.  Overall feeling much better in SR.   1. CAD - Continue ASA 81 mg PO daily.  Continue lipitor 40 mg PO QHS, no myalgias despite mildly elevated CPK 2. AF - s/p ablation, maintaining SR. Continue Xarelto 20 mg PO daily given AF.  3. HTN/chronic systolic CHF - controlled, continue Toprol 50 mg p.o. QHS given fatigue in the morning and continue Entresto 24-26 mg p.o. twice daily when able. 4. AS - moderate on last echo, will check again with upcoming test.   RTC 6 months, will call with echo results.  [EKG obtained to assist in diagnosis and management of assessed problem(s)] : EKG obtained to assist in diagnosis and management of assessed problem(s)

## 2024-05-31 NOTE — HISTORY OF PRESENT ILLNESS
[FreeTextEntry1] : 79M w/ PMH of HTN, HLD, obesity, CAD s/p CABG in 2012 (LIMA to LAD, SVG to OM, Diag, RPDA) presents for routine follow up.  Has noted increased SOB and PAULA with regular activities.  Is able to swim about 4 laps before having to stop.  However, when walking upstairs or for prolonged periods he gets PAULA.  Denies chest pain and palpitations.  Reports chronic right lower extremity edema since the CABG.  He had a recent EKG done at his primary care provider's office which revealed atrial fibrillation in addition to stable conduction disease.  He continues to experience dyspnea on mild exertion.  He denies anginal chest pains or lower extremity edema.   5/31/2024: Feeling well since his last visit.  Has seemingly maintained rhythm.  Tolerating AC well.

## 2024-05-31 NOTE — REASON FOR VISIT
[Symptom and Test Evaluation] : symptom and test evaluation [Cardiac Failure] : cardiac failure [Arrhythmia/ECG Abnorrmalities] : arrhythmia/ECG abnormalities [Hyperlipidemia] : hyperlipidemia [Hypertension] : hypertension [Coronary Artery Disease] : coronary artery disease [FreeTextEntry3] : Dr. Reno Lund

## 2024-05-31 NOTE — CARDIOLOGY SUMMARY
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 4/12/2022: Sinus rhythm, PAC, right bundle branch block,  LAFB, old anterior wall MI 5/13/2022: Atrial fibrillation 9/27/2022: Atrial fibrillation, RBBB, LAFB 5/31/2024: Sinus rhythm, RBBB, LAFB [de-identified] : 6/23/2021: Ejection fraction 45 to 50%, mild MR, mild aortic stenosis, moderately dilated left atrium, mild global LV systolic dysfunction, mild concentric LVH, mild right atrial enlargement, minimal TR, borderline elevated PASP at 37 mmHg. [de-identified] : 3/26/2019: LIMA to LAD patent, SVG to RPDA and SVG to OM 2 patent.  SVG to D1 likely occluded\par  2//2022: LIMA to LAD patent, SVG to RPDA and SVG to OM 2 patent.  SVG to D1 likely occluded

## 2024-05-31 NOTE — REVIEW OF SYSTEMS
[Negative] : Heme/Lymph [Feeling Fatigued] : not feeling fatigued [SOB] : no shortness of breath [Dyspnea on exertion] : not dyspnea during exertion

## 2024-08-05 ENCOUNTER — APPOINTMENT (OUTPATIENT)
Dept: CARDIOLOGY | Facility: CLINIC | Age: 79
End: 2024-08-05

## 2024-08-05 PROCEDURE — 93306 TTE W/DOPPLER COMPLETE: CPT

## 2024-10-19 ENCOUNTER — RX RENEWAL (OUTPATIENT)
Age: 79
End: 2024-10-19

## 2024-11-15 ENCOUNTER — APPOINTMENT (OUTPATIENT)
Dept: CARDIOLOGY | Facility: CLINIC | Age: 79
End: 2024-11-15

## 2024-11-29 ENCOUNTER — APPOINTMENT (OUTPATIENT)
Dept: CARDIOLOGY | Facility: CLINIC | Age: 79
End: 2024-11-29
Payer: MEDICARE

## 2024-11-29 VITALS
DIASTOLIC BLOOD PRESSURE: 90 MMHG | OXYGEN SATURATION: 97 % | HEART RATE: 71 BPM | SYSTOLIC BLOOD PRESSURE: 128 MMHG | WEIGHT: 236 LBS | BODY MASS INDEX: 31.14 KG/M2

## 2024-11-29 VITALS — SYSTOLIC BLOOD PRESSURE: 120 MMHG | DIASTOLIC BLOOD PRESSURE: 78 MMHG

## 2024-11-29 DIAGNOSIS — I50.22 CHRONIC SYSTOLIC (CONGESTIVE) HEART FAILURE: ICD-10-CM

## 2024-11-29 DIAGNOSIS — E78.00 PURE HYPERCHOLESTEROLEMIA, UNSPECIFIED: ICD-10-CM

## 2024-11-29 DIAGNOSIS — I48.19 OTHER PERSISTENT ATRIAL FIBRILLATION: ICD-10-CM

## 2024-11-29 DIAGNOSIS — I25.10 ATHEROSCLEROTIC HEART DISEASE OF NATIVE CORONARY ARTERY W/OUT ANGINA PECTORIS: ICD-10-CM

## 2024-11-29 DIAGNOSIS — R06.02 SHORTNESS OF BREATH: ICD-10-CM

## 2024-11-29 DIAGNOSIS — R60.0 LOCALIZED EDEMA: ICD-10-CM

## 2024-11-29 PROCEDURE — 99214 OFFICE O/P EST MOD 30 MIN: CPT

## 2024-12-25 PROBLEM — F10.90 ALCOHOL USE: Status: ACTIVE | Noted: 2018-01-16

## 2025-01-27 ENCOUNTER — RX RENEWAL (OUTPATIENT)
Age: 80
End: 2025-01-27

## 2025-04-28 ENCOUNTER — RX RENEWAL (OUTPATIENT)
Age: 80
End: 2025-04-28

## 2025-05-07 ENCOUNTER — APPOINTMENT (OUTPATIENT)
Dept: CARDIOLOGY | Facility: CLINIC | Age: 80
End: 2025-05-07
Payer: MEDICARE

## 2025-05-07 PROCEDURE — 78452 HT MUSCLE IMAGE SPECT MULT: CPT

## 2025-05-07 PROCEDURE — 96374 THER/PROPH/DIAG INJ IV PUSH: CPT | Mod: 59

## 2025-05-07 PROCEDURE — A9502: CPT | Mod: JZ

## 2025-05-07 PROCEDURE — 93015 CV STRESS TEST SUPVJ I&R: CPT

## 2025-05-07 PROCEDURE — 93306 TTE W/DOPPLER COMPLETE: CPT

## 2025-05-08 ENCOUNTER — NON-APPOINTMENT (OUTPATIENT)
Age: 80
End: 2025-05-08

## 2025-05-16 ENCOUNTER — APPOINTMENT (OUTPATIENT)
Dept: CARDIOLOGY | Facility: CLINIC | Age: 80
End: 2025-05-16

## 2025-05-16 ENCOUNTER — NON-APPOINTMENT (OUTPATIENT)
Age: 80
End: 2025-05-16

## 2025-05-16 ENCOUNTER — APPOINTMENT (OUTPATIENT)
Dept: CARDIOLOGY | Facility: CLINIC | Age: 80
End: 2025-05-16
Payer: MEDICARE

## 2025-05-16 VITALS
BODY MASS INDEX: 31.14 KG/M2 | DIASTOLIC BLOOD PRESSURE: 72 MMHG | SYSTOLIC BLOOD PRESSURE: 120 MMHG | OXYGEN SATURATION: 98 % | HEART RATE: 62 BPM | WEIGHT: 236 LBS

## 2025-05-16 DIAGNOSIS — I25.10 ATHEROSCLEROTIC HEART DISEASE OF NATIVE CORONARY ARTERY W/OUT ANGINA PECTORIS: ICD-10-CM

## 2025-05-16 DIAGNOSIS — I48.19 OTHER PERSISTENT ATRIAL FIBRILLATION: ICD-10-CM

## 2025-05-16 DIAGNOSIS — I10 ESSENTIAL (PRIMARY) HYPERTENSION: ICD-10-CM

## 2025-05-16 PROCEDURE — 99214 OFFICE O/P EST MOD 30 MIN: CPT

## 2025-05-16 PROCEDURE — G2211 COMPLEX E/M VISIT ADD ON: CPT

## 2025-05-16 PROCEDURE — 93000 ELECTROCARDIOGRAM COMPLETE: CPT

## 2025-05-23 ENCOUNTER — APPOINTMENT (OUTPATIENT)
Age: 80
End: 2025-05-23

## 2025-05-23 ENCOUNTER — APPOINTMENT (OUTPATIENT)
Dept: OPHTHALMOLOGY | Facility: CLINIC | Age: 80
End: 2025-05-23
Payer: MEDICARE

## 2025-05-23 ENCOUNTER — APPOINTMENT (OUTPATIENT)
Dept: OPHTHALMOLOGY | Facility: CLINIC | Age: 80
End: 2025-05-23
Payer: SELF-PAY

## 2025-05-23 ENCOUNTER — NON-APPOINTMENT (OUTPATIENT)
Age: 80
End: 2025-05-23

## 2025-05-23 PROCEDURE — 92202 OPSCPY EXTND ON/MAC DRAW: CPT

## 2025-05-23 PROCEDURE — 92014 COMPRE OPH EXAM EST PT 1/>: CPT

## 2025-05-23 PROCEDURE — 92015 DETERMINE REFRACTIVE STATE: CPT

## 2025-06-11 ENCOUNTER — INPATIENT (INPATIENT)
Facility: HOSPITAL | Age: 80
LOS: 4 days | Discharge: ANOTHER HOSPITAL | DRG: 64 | End: 2025-06-16
Attending: SPECIALIST | Admitting: STUDENT IN AN ORGANIZED HEALTH CARE EDUCATION/TRAINING PROGRAM
Payer: MEDICARE

## 2025-06-11 VITALS
TEMPERATURE: 98 F | SYSTOLIC BLOOD PRESSURE: 154 MMHG | RESPIRATION RATE: 20 BRPM | HEART RATE: 122 BPM | DIASTOLIC BLOOD PRESSURE: 89 MMHG | OXYGEN SATURATION: 98 %

## 2025-06-11 DIAGNOSIS — I61.9 NONTRAUMATIC INTRACEREBRAL HEMORRHAGE, UNSPECIFIED: ICD-10-CM

## 2025-06-11 DIAGNOSIS — Z95.1 PRESENCE OF AORTOCORONARY BYPASS GRAFT: Chronic | ICD-10-CM

## 2025-06-11 LAB — GLUCOSE BLDC GLUCOMTR-MCNC: 91 MG/DL — SIGNIFICANT CHANGE UP (ref 70–99)

## 2025-06-11 PROCEDURE — 99285 EMERGENCY DEPT VISIT HI MDM: CPT

## 2025-06-11 PROCEDURE — 93010 ELECTROCARDIOGRAM REPORT: CPT

## 2025-06-11 PROCEDURE — 70450 CT HEAD/BRAIN W/O DYE: CPT | Mod: 26,59,77

## 2025-06-11 PROCEDURE — 99291 CRITICAL CARE FIRST HOUR: CPT | Mod: FS

## 2025-06-11 RX ORDER — NICARDIPINE HCL 30 MG
15 CAPSULE ORAL
Qty: 40 | Refills: 0 | Status: DISCONTINUED | OUTPATIENT
Start: 2025-06-11 | End: 2025-06-12

## 2025-06-11 RX ORDER — DEXTROSE 50 % IN WATER 50 %
25 SYRINGE (ML) INTRAVENOUS ONCE
Refills: 0 | Status: DISCONTINUED | OUTPATIENT
Start: 2025-06-11 | End: 2025-06-16

## 2025-06-11 RX ORDER — ACETAMINOPHEN 500 MG/5ML
650 LIQUID (ML) ORAL EVERY 6 HOURS
Refills: 0 | Status: DISCONTINUED | OUTPATIENT
Start: 2025-06-11 | End: 2025-06-16

## 2025-06-11 RX ORDER — DEXTROSE 50 % IN WATER 50 %
15 SYRINGE (ML) INTRAVENOUS ONCE
Refills: 0 | Status: DISCONTINUED | OUTPATIENT
Start: 2025-06-11 | End: 2025-06-12

## 2025-06-11 RX ORDER — METOPROLOL SUCCINATE 50 MG/1
5 TABLET, EXTENDED RELEASE ORAL EVERY 6 HOURS
Refills: 0 | Status: DISCONTINUED | OUTPATIENT
Start: 2025-06-11 | End: 2025-06-12

## 2025-06-11 RX ORDER — SODIUM CHLORIDE 9 G/1000ML
1000 INJECTION, SOLUTION INTRAVENOUS
Refills: 0 | Status: DISCONTINUED | OUTPATIENT
Start: 2025-06-11 | End: 2025-06-12

## 2025-06-11 RX ORDER — GLUCAGON 3 MG/1
1 POWDER NASAL ONCE
Refills: 0 | Status: DISCONTINUED | OUTPATIENT
Start: 2025-06-11 | End: 2025-06-12

## 2025-06-11 RX ORDER — METOPROLOL SUCCINATE 50 MG/1
2.5 TABLET, EXTENDED RELEASE ORAL ONCE
Refills: 0 | Status: COMPLETED | OUTPATIENT
Start: 2025-06-11 | End: 2025-06-11

## 2025-06-11 RX ORDER — POLYETHYLENE GLYCOL 3350 17 G/17G
17 POWDER, FOR SOLUTION ORAL DAILY
Refills: 0 | Status: DISCONTINUED | OUTPATIENT
Start: 2025-06-11 | End: 2025-06-11

## 2025-06-11 RX ORDER — LEVETIRACETAM 10 MG/ML
500 INJECTION, SOLUTION INTRAVENOUS EVERY 12 HOURS
Refills: 0 | Status: DISCONTINUED | OUTPATIENT
Start: 2025-06-11 | End: 2025-06-12

## 2025-06-11 RX ORDER — DESMOPRESSIN ACETATE 4 UG/ML
40 INJECTION INTRAVENOUS ONCE
Refills: 0 | Status: COMPLETED | OUTPATIENT
Start: 2025-06-11 | End: 2025-06-11

## 2025-06-11 RX ORDER — SENNA 187 MG
2 TABLET ORAL AT BEDTIME
Refills: 0 | Status: DISCONTINUED | OUTPATIENT
Start: 2025-06-11 | End: 2025-06-11

## 2025-06-11 RX ORDER — INSULIN LISPRO 100 U/ML
INJECTION, SOLUTION INTRAVENOUS; SUBCUTANEOUS
Refills: 0 | Status: DISCONTINUED | OUTPATIENT
Start: 2025-06-11 | End: 2025-06-15

## 2025-06-11 RX ADMIN — METOPROLOL SUCCINATE 2.5 MILLIGRAM(S): 50 TABLET, EXTENDED RELEASE ORAL at 21:45

## 2025-06-11 RX ADMIN — DESMOPRESSIN ACETATE 120 MICROGRAM(S): 4 INJECTION INTRAVENOUS at 23:24

## 2025-06-11 RX ADMIN — METOPROLOL SUCCINATE 5 MILLIGRAM(S): 50 TABLET, EXTENDED RELEASE ORAL at 23:23

## 2025-06-11 NOTE — ED ADULT TRIAGE NOTE - CHIEF COMPLAINT QUOTE
pt transfer from [place] for head bleed, pt A&Ox1- oriented to self, as per wife at bedside, around noon pt started having confusion and right eye vision loss, arrived on 15mg Cardene

## 2025-06-11 NOTE — ED PROVIDER NOTE - OBJECTIVE STATEMENT
80 year old with pmha fib on xarelto presents as a transfer from outside facility for ICH, started on cardene drip and given andexa.

## 2025-06-11 NOTE — H&P ADULT - NSHPPHYSICALEXAM_GEN_ALL_CORE
General: NAD, pt is comfortably sitting up in bed, on room air  Cardiovascular: Irregularly irregular rate and rhythm   Respiratory: lungs CTAB, no wheezing, rhonchi, or crackles   GI: normoactive BS to auscultation, abd soft, NTND   Neuro: AAOx2 (self and place), FC, confused speech   CNII-CXII: PERRL 3mm briskly reactive, EOMI b/l, face symmetric, tongue midline, right homonymous hemianopsia, baseline decreased hearing on left  Motor: MOREL x4 spontaneously, 5/5 strength in UE b/l throughout, 5/5 LLE, 4+/5 RLE with RLE drift. Decreased sensation throughout RLE

## 2025-06-11 NOTE — ED ADULT NURSE NOTE - OBJECTIVE STATEMENT
Patient transferred from [facility] for head bleed. Patient AOx1 as per wife around noon, patient confused and had right eye vision loss. Patient Aox2 at this time, moves upper and lower extremities. PERRLA +2. No drift UE/LEs, equal strength, no sensory loss. Slight visual loss to right eye, Samish to left ear. No complains of pain or shortness of breath. No GI issues. States he has been voiding frequently. +2 peripheral pulses, +1 generalized edema.

## 2025-06-11 NOTE — H&P ADULT - ASSESSMENT
81yo M with PMHx GERD, HTN, BPH, CAD on baby aspirin, s/p CABG 2012, afib on xarelto s/p ablation, presented to [facility] today with confusion and new onset vision loss. CTH with findings of an acute left parieto-occipital ICH. Andexxa given for xarelto reversal. NIHSS 5. ICH score 1. Pt was transferred to [facility] for further management.     PLAN:  Neuro:  -neuro checks q1h  -stroke core measures  -keppra 500mg bid for seizure prophylaxis  -obtain repeat 6h CTH and repeat 24h CTH  -MRI brain w/wo  -stroke neurology consult    Cardiac:  -SBP goal 100-160  -cardene drip  -obtain TTE  -continue home metoprolol    Pulm;  -room air  -encourage incentive spirometer use  -SpO2 goal >92%    Renal:  -NS at 75cc/hr  -strict I's and O's    GI:  -NPO for now  -dysphagia screen  -bowel regimen once passes dysphagia    Heme:  -DVT ppx: SCDs  -h/o GERD: protonix 40mg qd    Endo:  -ISS, f/u a1c  -enlarged thyroid seen on CTA neck, obtain thyroid US    ID:  -afebrile, no issues    Plan discussed with Dr. Corado and Dr. Carter

## 2025-06-11 NOTE — ED ADULT NURSE NOTE - CHIEF COMPLAINT QUOTE
pt transfer from [place] for head bleed, pt A&Ox1- oriented to self, as per wife at bedside, around noon pt started having confusion and right eye vision loss, arrived on 15mg Cardene

## 2025-06-11 NOTE — ED PROVIDER NOTE - SEVERE SEPSIS CRITERIA MET YN (MLM)
Sepsis Criteria were met: Itraconazole Counseling:  I discussed with the patient the risks of itraconazole including but not limited to liver damage, nausea/vomiting, neuropathy, and severe allergy.  The patient understands that this medication is best absorbed when taken with acidic beverages such as non-diet cola or ginger ale.  The patient understands that monitoring is required including baseline LFTs and repeat LFTs at intervals.  The patient understands that they are to contact us or the primary physician if concerning signs are noted.

## 2025-06-11 NOTE — ED ADULT NURSE NOTE - NSFALLHARMRISKINTERV_ED_ALL_ED

## 2025-06-11 NOTE — H&P ADULT - HISTORY OF PRESENT ILLNESS
81yo M with PMHx GERD, HTN, BPH, CAD on baby aspirin, s/p CABG 2012, afib on xarelto s/p ablation, presented to [facility] today with confusion and new onset vision loss. Per patient's wife, Pt returned from a doctor's appointment this afternoon in his usual state of health, LKN 12pm. A few hours after around 4pm, Pt started experiencing dizziness, memory loss, forgetfulness, and vision loss. Pt was walking in the wrong rooms and placing items in incorrect places. CTH revealed an acute left parieto-occipital ICH. 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 Pt was given andexxa for xarelto reversal. SBP also noted to be in 200s, started on cardene drip. Pt denies headaches, N/V/D, CP, SOB, neck pain, recent injury to head/neck. NIHSS 5. ICH score 1. Pt was transferred to [facility] for further management.  79yo M with PMHx GERD, HTN, BPH, CAD on baby aspirin, s/p CABG 2012, afib on xarelto s/p ablation, [place] [facility] [place] with confusion and new onset vision loss. Per patient's wife, Pt returned from a doctor's appointment this afternoon in his usual state of health, LKN 12pm. A few hours after around 4pm, Pt started experiencing dizziness, memory loss, forgetfulness, and vision loss. Pt was walking in the wrong rooms and placing items in incorrect places. CTH revealed an acute left parieto-occipital ICH. 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 Pt was given andexxa for xarelto reversal. SBP also noted to be in 200s, started on cardene drip. Pt denies headaches, N/V/D, CP, SOB, neck pain, recent injury to head/neck. NIHSS 5. ICH score 1. MRS 0. Pt was transferred to [facility] for further management.

## 2025-06-12 ENCOUNTER — TRANSCRIPTION ENCOUNTER (OUTPATIENT)
Age: 80
End: 2025-06-12

## 2025-06-12 LAB
A1C WITH ESTIMATED AVERAGE GLUCOSE RESULT: 5.7 % — HIGH (ref 4–5.6)
ALBUMIN SERPL ELPH-MCNC: 3.3 G/DL — SIGNIFICANT CHANGE UP (ref 3.3–5.2)
ALP SERPL-CCNC: 81 U/L — SIGNIFICANT CHANGE UP (ref 40–120)
ALT FLD-CCNC: 8 U/L — SIGNIFICANT CHANGE UP
ANION GAP SERPL CALC-SCNC: 13 MMOL/L — SIGNIFICANT CHANGE UP (ref 5–17)
ANION GAP SERPL CALC-SCNC: 15 MMOL/L — SIGNIFICANT CHANGE UP (ref 5–17)
APTT BLD: 30.2 SEC — SIGNIFICANT CHANGE UP (ref 26.1–36.8)
AST SERPL-CCNC: 18 U/L — SIGNIFICANT CHANGE UP
BILIRUB SERPL-MCNC: 1.5 MG/DL — SIGNIFICANT CHANGE UP (ref 0.4–2)
BUN SERPL-MCNC: 15.1 MG/DL — SIGNIFICANT CHANGE UP (ref 8–20)
BUN SERPL-MCNC: 15.4 MG/DL — SIGNIFICANT CHANGE UP (ref 8–20)
CALCIUM SERPL-MCNC: 8.3 MG/DL — LOW (ref 8.4–10.5)
CALCIUM SERPL-MCNC: 8.5 MG/DL — SIGNIFICANT CHANGE UP (ref 8.4–10.5)
CHLORIDE SERPL-SCNC: 104 MMOL/L — SIGNIFICANT CHANGE UP (ref 96–108)
CHLORIDE SERPL-SCNC: 106 MMOL/L — SIGNIFICANT CHANGE UP (ref 96–108)
CHOLEST SERPL-MCNC: 142 MG/DL — SIGNIFICANT CHANGE UP
CO2 SERPL-SCNC: 16 MMOL/L — LOW (ref 22–29)
CO2 SERPL-SCNC: 20 MMOL/L — LOW (ref 22–29)
CREAT SERPL-MCNC: 0.94 MG/DL — SIGNIFICANT CHANGE UP (ref 0.5–1.3)
CREAT SERPL-MCNC: 0.98 MG/DL — SIGNIFICANT CHANGE UP (ref 0.5–1.3)
EGFR: 79 ML/MIN/1.73M2 — SIGNIFICANT CHANGE UP
EGFR: 79 ML/MIN/1.73M2 — SIGNIFICANT CHANGE UP
EGFR: 83 ML/MIN/1.73M2 — SIGNIFICANT CHANGE UP
EGFR: 83 ML/MIN/1.73M2 — SIGNIFICANT CHANGE UP
ESTIMATED AVERAGE GLUCOSE: 117 MG/DL — HIGH (ref 68–114)
GLUCOSE BLDC GLUCOMTR-MCNC: 112 MG/DL — HIGH (ref 70–99)
GLUCOSE BLDC GLUCOMTR-MCNC: 88 MG/DL — SIGNIFICANT CHANGE UP (ref 70–99)
GLUCOSE BLDC GLUCOMTR-MCNC: 89 MG/DL — SIGNIFICANT CHANGE UP (ref 70–99)
GLUCOSE SERPL-MCNC: 97 MG/DL — SIGNIFICANT CHANGE UP (ref 70–99)
GLUCOSE SERPL-MCNC: 98 MG/DL — SIGNIFICANT CHANGE UP (ref 70–99)
HCT VFR BLD CALC: 45.5 % — SIGNIFICANT CHANGE UP (ref 39–50)
HCT VFR BLD CALC: 45.9 % — SIGNIFICANT CHANGE UP (ref 39–50)
HDLC SERPL-MCNC: 64 MG/DL — SIGNIFICANT CHANGE UP
HGB BLD-MCNC: 14.8 G/DL — SIGNIFICANT CHANGE UP (ref 13–17)
HGB BLD-MCNC: 14.9 G/DL — SIGNIFICANT CHANGE UP (ref 13–17)
INR BLD: 1.43 RATIO — HIGH (ref 0.85–1.16)
LDLC SERPL-MCNC: 65 MG/DL — SIGNIFICANT CHANGE UP
LIPID PNL WITH DIRECT LDL SERPL: 65 MG/DL — SIGNIFICANT CHANGE UP
MAGNESIUM SERPL-MCNC: 1.8 MG/DL — SIGNIFICANT CHANGE UP (ref 1.6–2.6)
MAGNESIUM SERPL-MCNC: 1.9 MG/DL — SIGNIFICANT CHANGE UP (ref 1.6–2.6)
MCHC RBC-ENTMCNC: 25 PG — LOW (ref 27–34)
MCHC RBC-ENTMCNC: 25.5 PG — LOW (ref 27–34)
MCHC RBC-ENTMCNC: 32.5 G/DL — SIGNIFICANT CHANGE UP (ref 32–36)
MCHC RBC-ENTMCNC: 32.5 G/DL — SIGNIFICANT CHANGE UP (ref 32–36)
MCV RBC AUTO: 77 FL — LOW (ref 80–100)
MCV RBC AUTO: 78.5 FL — LOW (ref 80–100)
MRSA PCR RESULT.: SIGNIFICANT CHANGE UP
NONHDLC SERPL-MCNC: 78 MG/DL — SIGNIFICANT CHANGE UP
NRBC # BLD AUTO: 0 K/UL — SIGNIFICANT CHANGE UP (ref 0–0)
NRBC # BLD AUTO: 0 K/UL — SIGNIFICANT CHANGE UP (ref 0–0)
NRBC # FLD: 0 K/UL — SIGNIFICANT CHANGE UP (ref 0–0)
NRBC # FLD: 0 K/UL — SIGNIFICANT CHANGE UP (ref 0–0)
NRBC BLD AUTO-RTO: 0 /100 WBCS — SIGNIFICANT CHANGE UP (ref 0–0)
NRBC BLD AUTO-RTO: 0 /100 WBCS — SIGNIFICANT CHANGE UP (ref 0–0)
PHOSPHATE SERPL-MCNC: 2.8 MG/DL — SIGNIFICANT CHANGE UP (ref 2.4–4.7)
PHOSPHATE SERPL-MCNC: 3.1 MG/DL — SIGNIFICANT CHANGE UP (ref 2.4–4.7)
PLATELET # BLD AUTO: 182 K/UL — SIGNIFICANT CHANGE UP (ref 150–400)
PLATELET # BLD AUTO: 195 K/UL — SIGNIFICANT CHANGE UP (ref 150–400)
PMV BLD: 10.9 FL — SIGNIFICANT CHANGE UP (ref 7–13)
PMV BLD: 10.9 FL — SIGNIFICANT CHANGE UP (ref 7–13)
POTASSIUM SERPL-MCNC: 3.5 MMOL/L — SIGNIFICANT CHANGE UP (ref 3.5–5.3)
POTASSIUM SERPL-MCNC: 4.2 MMOL/L — SIGNIFICANT CHANGE UP (ref 3.5–5.3)
POTASSIUM SERPL-SCNC: 3.5 MMOL/L — SIGNIFICANT CHANGE UP (ref 3.5–5.3)
POTASSIUM SERPL-SCNC: 4.2 MMOL/L — SIGNIFICANT CHANGE UP (ref 3.5–5.3)
PROT SERPL-MCNC: 6.8 G/DL — SIGNIFICANT CHANGE UP (ref 6.6–8.7)
PROTHROM AB SERPL-ACNC: 16.1 SEC — HIGH (ref 9.9–13.4)
RBC # BLD: 5.85 M/UL — HIGH (ref 4.2–5.8)
RBC # BLD: 5.91 M/UL — HIGH (ref 4.2–5.8)
RBC # FLD: 15.6 % — HIGH (ref 10.3–14.5)
RBC # FLD: 15.7 % — HIGH (ref 10.3–14.5)
S AUREUS DNA NOSE QL NAA+PROBE: SIGNIFICANT CHANGE UP
SODIUM SERPL-SCNC: 135 MMOL/L — SIGNIFICANT CHANGE UP (ref 135–145)
SODIUM SERPL-SCNC: 138 MMOL/L — SIGNIFICANT CHANGE UP (ref 135–145)
TRIGL SERPL-MCNC: 66 MG/DL — SIGNIFICANT CHANGE UP
WBC # BLD: 8.17 K/UL — SIGNIFICANT CHANGE UP (ref 3.8–10.5)
WBC # BLD: 9.68 K/UL — SIGNIFICANT CHANGE UP (ref 3.8–10.5)
WBC # FLD AUTO: 8.17 K/UL — SIGNIFICANT CHANGE UP (ref 3.8–10.5)
WBC # FLD AUTO: 9.68 K/UL — SIGNIFICANT CHANGE UP (ref 3.8–10.5)

## 2025-06-12 PROCEDURE — 99291 CRITICAL CARE FIRST HOUR: CPT | Mod: FS

## 2025-06-12 PROCEDURE — 70553 MRI BRAIN STEM W/O & W/DYE: CPT | Mod: 26

## 2025-06-12 PROCEDURE — 70450 CT HEAD/BRAIN W/O DYE: CPT | Mod: 26

## 2025-06-12 RX ORDER — SACUBITRIL AND VALSARTAN 49; 51 MG/1; MG/1
1 TABLET, FILM COATED ORAL
Refills: 0 | DISCHARGE

## 2025-06-12 RX ORDER — MAGNESIUM SULFATE 500 MG/ML
2 SYRINGE (ML) INJECTION ONCE
Refills: 0 | Status: COMPLETED | OUTPATIENT
Start: 2025-06-12 | End: 2025-06-12

## 2025-06-12 RX ORDER — ATORVASTATIN CALCIUM 80 MG/1
40 TABLET, FILM COATED ORAL AT BEDTIME
Refills: 0 | Status: DISCONTINUED | OUTPATIENT
Start: 2025-06-12 | End: 2025-06-16

## 2025-06-12 RX ORDER — MELATONIN 5 MG
3 TABLET ORAL AT BEDTIME
Refills: 0 | Status: DISCONTINUED | OUTPATIENT
Start: 2025-06-12 | End: 2025-06-16

## 2025-06-12 RX ORDER — POLYETHYLENE GLYCOL 3350 17 G/17G
17 POWDER, FOR SOLUTION ORAL DAILY
Refills: 0 | Status: DISCONTINUED | OUTPATIENT
Start: 2025-06-12 | End: 2025-06-16

## 2025-06-12 RX ORDER — ACETAMINOPHEN 500 MG/5ML
1000 LIQUID (ML) ORAL ONCE
Refills: 0 | Status: COMPLETED | OUTPATIENT
Start: 2025-06-12 | End: 2025-06-12

## 2025-06-12 RX ORDER — OXYBUTYNIN CHLORIDE 5 MG/1
15 TABLET, FILM COATED, EXTENDED RELEASE ORAL DAILY
Refills: 0 | Status: DISCONTINUED | OUTPATIENT
Start: 2025-06-12 | End: 2025-06-16

## 2025-06-12 RX ORDER — LEVETIRACETAM 10 MG/ML
500 INJECTION, SOLUTION INTRAVENOUS EVERY 12 HOURS
Refills: 0 | Status: DISCONTINUED | OUTPATIENT
Start: 2025-06-12 | End: 2025-06-12

## 2025-06-12 RX ORDER — SENNA 187 MG
2 TABLET ORAL AT BEDTIME
Refills: 0 | Status: DISCONTINUED | OUTPATIENT
Start: 2025-06-12 | End: 2025-06-16

## 2025-06-12 RX ORDER — METOPROLOL SUCCINATE 50 MG/1
50 TABLET, EXTENDED RELEASE ORAL DAILY
Refills: 0 | Status: DISCONTINUED | OUTPATIENT
Start: 2025-06-12 | End: 2025-06-15

## 2025-06-12 RX ORDER — LABETALOL HYDROCHLORIDE 200 MG/1
10 TABLET, FILM COATED ORAL
Refills: 0 | Status: DISCONTINUED | OUTPATIENT
Start: 2025-06-12 | End: 2025-06-16

## 2025-06-12 RX ORDER — SACUBITRIL AND VALSARTAN 49; 51 MG/1; MG/1
1 TABLET, FILM COATED ORAL
Refills: 0 | Status: DISCONTINUED | OUTPATIENT
Start: 2025-06-12 | End: 2025-06-16

## 2025-06-12 RX ORDER — NICARDIPINE HCL 30 MG
15 CAPSULE ORAL
Qty: 40 | Refills: 0 | Status: DISCONTINUED | OUTPATIENT
Start: 2025-06-12 | End: 2025-06-12

## 2025-06-12 RX ORDER — ALPRAZOLAM 0.5 MG
0.5 TABLET, EXTENDED RELEASE 24 HR ORAL ONCE
Refills: 0 | Status: DISCONTINUED | OUTPATIENT
Start: 2025-06-12 | End: 2025-06-12

## 2025-06-12 RX ADMIN — SACUBITRIL AND VALSARTAN 1 TABLET(S): 49; 51 TABLET, FILM COATED ORAL at 17:30

## 2025-06-12 RX ADMIN — Medication 75 MILLILITER(S): at 00:20

## 2025-06-12 RX ADMIN — POLYETHYLENE GLYCOL 3350 17 GRAM(S): 17 POWDER, FOR SOLUTION ORAL at 11:52

## 2025-06-12 RX ADMIN — LEVETIRACETAM 400 MILLIGRAM(S): 10 INJECTION, SOLUTION INTRAVENOUS at 05:46

## 2025-06-12 RX ADMIN — Medication 5 MILLIGRAM(S): at 11:47

## 2025-06-12 RX ADMIN — Medication 1000 MILLIGRAM(S): at 11:00

## 2025-06-12 RX ADMIN — ATORVASTATIN CALCIUM 40 MILLIGRAM(S): 80 TABLET, FILM COATED ORAL at 21:31

## 2025-06-12 RX ADMIN — METOPROLOL SUCCINATE 50 MILLIGRAM(S): 50 TABLET, EXTENDED RELEASE ORAL at 14:30

## 2025-06-12 RX ADMIN — Medication 650 MILLIGRAM(S): at 18:30

## 2025-06-12 RX ADMIN — Medication 400 MILLIGRAM(S): at 10:32

## 2025-06-12 RX ADMIN — Medication 1 APPLICATION(S): at 11:48

## 2025-06-12 RX ADMIN — LABETALOL HYDROCHLORIDE 10 MILLIGRAM(S): 200 TABLET, FILM COATED ORAL at 23:34

## 2025-06-12 RX ADMIN — Medication 25 GRAM(S): at 05:45

## 2025-06-12 RX ADMIN — Medication 650 MILLIGRAM(S): at 17:45

## 2025-06-12 RX ADMIN — Medication 10 MILLIGRAM(S): at 22:19

## 2025-06-12 RX ADMIN — METOPROLOL SUCCINATE 5 MILLIGRAM(S): 50 TABLET, EXTENDED RELEASE ORAL at 05:46

## 2025-06-12 RX ADMIN — OXYBUTYNIN CHLORIDE 15 MILLIGRAM(S): 5 TABLET, FILM COATED, EXTENDED RELEASE ORAL at 14:33

## 2025-06-12 RX ADMIN — Medication 3 MILLIGRAM(S): at 23:35

## 2025-06-12 RX ADMIN — Medication 40 MILLIEQUIVALENT(S): at 10:32

## 2025-06-12 RX ADMIN — Medication 500 MILLIGRAM(S): at 17:30

## 2025-06-12 RX ADMIN — Medication 75 MG/HR: at 02:41

## 2025-06-12 RX ADMIN — Medication 1 SPRAY(S): at 11:48

## 2025-06-12 RX ADMIN — Medication 40 MILLIEQUIVALENT(S): at 14:30

## 2025-06-12 RX ADMIN — Medication 0.5 MILLIGRAM(S): at 11:42

## 2025-06-12 NOTE — CONSULT NOTE ADULT - CRITICAL CARE ATTENDING COMMENT
L parieto-occipital ICH  Hypertensive vs CAA + coagulopathy  stability scans stable  eventually needs MRI  off cardene since this AM  plan for transfer to [facility] if BP remains stable, 24h stability scan

## 2025-06-12 NOTE — CHART NOTE - NSCHARTNOTEFT_GEN_A_CORE
DOWNGRADE NOTE    HPI:  81yo M with PMHx GERD, HTN, BPH, CAD on baby aspirin, s/p CABG 2012, afib on xarelto s/p ablation, presented to [facility] 6/11/25 with confusion and new onset vision loss. Per patient's wife, Pt returned from a doctor's appointment this afternoon in his usual state of health, LKN 12pm. A few hours after around 4pm, pt started experiencing dizziness, memory loss, forgetfulness, and vision loss. Pt was walking in the wrong rooms and placing items in incorrect places. CTH revealed an acute left parieto-occipital ICH. 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 Pt was given andexxa for xarelto reversal. SBP also noted to be in 200s, started on cardene drip. Pt denies headaches, N/V/D, CP, SOB, neck pain, recent injury to head/neck. NIHSS 5. ICH score 1. MRS 0. Pt was transferred to [facility] for further management and admitted to NSICU.      HOSPITAL COURSE:  6/11: 80y Male presented to [facility] on 6/11/25 with AMS and sudden onset vision loss, CTH revealed an acute left parieto-occipital ICH. Xarelto reversed with Andexxa, DDDAVP given for ASA use. Lopressor 2.5 IVP given for episode of Afib with RVR. Repeat CTH stable ICH.  6/12: Cardene gtt d/c'd overnight. MRI Brain w/wo completed - pending official review. Keppra switched to depakote due to agitation. Home meds resumed. 24hr CTH completed _______    Vital Signs Last 24 Hrs  T(C): 37.3 (12 Jun 2025 12:00), Max: 37.3 (12 Jun 2025 07:41)  T(F): 99.2 (12 Jun 2025 12:00), Max: 99.2 (12 Jun 2025 12:00)  HR: 78 (12 Jun 2025 18:00) (77 - 162)  BP: 148/82 (12 Jun 2025 18:00) (100/65 - 160/95)  BP(mean): 104 (12 Jun 2025 18:00) (67 - 115)  RR: 16 (12 Jun 2025 18:00) (14 - 34)  SpO2: 100% (12 Jun 2025 18:00) (93% - 100%)    Parameters below as of 12 Jun 2025 16:00  Patient On (Oxygen Delivery Method): room air      PHYSICAL EXAM:  GENERAL: NAD  HEAD:  Atraumatic, normocephalic  GLENDA COMA SCORE: E-4 V-4 M-6 =14  MENTAL STATUS: AAO x2 (self/place); Awake; Opens eyes spontaneously; following simple commands  CRANIAL NERVES: PERRL. Right homonymous hemianopsia, Face symmetric, tongue midline. Speech clear. Baseline decreased hearing on left  MOTOR: MOREL x4 spontaneously, 5/5 strength in UE b/l throughout, 5/5 LLE, 4+/5 RLE with RLE drift.  SENSATION: Decreased sensation throughout RLE  CHEST/LUNG: Non-labored breathing on room air   SKIN: Warm, dry    LABS:                        14.8   8.17  )-----------( 182      ( 12 Jun 2025 06:00 )             45.5     06-12    138  |  104  |  15.1  ----------------------------<  98  3.5   |  20.0[L]  |  0.98    Ca    8.5      12 Jun 2025 06:00  Phos  3.1     06-12  Mg     1.9     06-12    TPro  6.8  /  Alb  3.3  /  TBili  1.5  /  DBili  x   /  AST  18  /  ALT  8   /  AlkPhos  81  06-12    PT/INR - ( 12 Jun 2025 06:00 )   PT: 16.1 sec;   INR: 1.43 ratio         PTT - ( 12 Jun 2025 06:00 )  PTT:30.2 sec  Urinalysis Basic - ( 12 Jun 2025 06:00 )    Color: x / Appearance: x / SG: x / pH: x  Gluc: 98 mg/dL / Ketone: x  / Bili: x / Urobili: x   Blood: x / Protein: x / Nitrite: x   Leuk Esterase: x / RBC: x / WBC x   Sq Epi: x / Non Sq Epi: x / Bacteria: x        06-11 @ 07:01  -  06-12 @ 07:00  --------------------------------------------------------  IN: 1152.5 mL / OUT: 1150 mL / NET: 2.5 mL    06-12 @ 07:01  - 06-12 @ 18:49  --------------------------------------------------------  IN: 1235 mL / OUT: 600 mL / NET: 635 mL        RADIOLOGY & ADDITIONAL TESTS:  < from: CT Head No Cont (06.11.25 @ 22:47) >    FINDINGS:  INTRA-AXIAL: Stable left posterior parietal-occipital paracentral   intraparenchymal hematoma measuring 3.2 x 3.9 x 3.5 cm with surrounding   edema and mass effect on the splenium of the corpus callosum and atrium   of the left lateral ventricle. There are periventricular white matter   hypodensities that are nonspecific in nature but may reflect chronic   ischemic microvascular disease.  EXTRA-AXIAL: No extra-axial fluid collection is present.  VENTRICLES AND SULCI: Parenchymal volume is commensurate with patient   age. No hydrocephalus.  VISUALIZED SINUSES: Mild mucosal thickening.  VISUALIZED MASTOIDS: Clear.  CALVARIUM: Normal.    IMPRESSION:    Stable left posterior parietal-occipital paracentral intraparenchymal   hematoma    --- End of Report ---    < end of copied text >    ASSESSMENT:  81yo M with PMHx GERD, HTN, BPH, CAD on baby aspirin, s/p CABG 2012, afib on xarelto s/p ablation, presented to [facility] today with confusion and new onset vision loss. CTH with findings of an acute left parieto-occipital ICH. Andexxa given for xarelto reversal. NIHSS 5. ICH score 1. Pt was transferred to [facility] and admitted to NSICU for further management.    PLAN:  NEURO:  - q2h neuro checks, q2h vitals  - MRI brain w/wo completed - official read pending   - 24hr CTH ___  - Depakote 500 BID for seizure ppx  - Pain control prn: avoid oversedation  - Activity: mobilize as tolerated    PULM:  - SpO2 goal > 92%  - Incentive spirometry    CV:  - SBP goal 100-160  - Continue Metoprolol 50mg qd, Entresto 24-26mg, Lipitor 40mg  - PRN: Hydralazine, Labetalol  - TTE  - LED    RENAL:  - Voiding  - Oxybutynin 15    GI:  - DASH  - GI prophylaxis: Protonix (home med)  - Bowel regimen: Senna, Miralax. LBM 6/12    ENDO:   - Goal euglycemia (-180)  - ISS    HEME/ONC:  - VTE prophylaxis: SCDs    ID:  - Monitor temperature and WBC    Discussed with Dr. Brooke TRANSFER TO STROKE NEUROLOGY STEPDOWN    HPI:  79yo M with PMHx GERD, HTN, BPH, CAD on baby aspirin, s/p CABG 2012, afib on xarelto s/p ablation, presented to [facility] 6/11/25 with confusion and new onset vision loss. Per patient's wife, Pt returned from a doctor's appointment this afternoon in his usual state of health, LKN 12pm. A few hours after around 4pm, pt started experiencing dizziness, memory loss, forgetfulness, and vision loss. Pt was walking in the wrong rooms and placing items in incorrect places. CTH revealed an acute left parieto-occipital ICH. 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 Pt was given andexxa for xarelto reversal. SBP also noted to be in 200s, started on cardene drip. Pt denies headaches, N/V/D, CP, SOB, neck pain, recent injury to head/neck. NIHSS 5. ICH score 1. MRS 0. Pt was transferred to [facility] for further management and admitted to NSICU.      HOSPITAL COURSE:  6/11: 80y Male presented to [facility] on 6/11/25 with AMS and sudden onset vision loss, CTH revealed an acute left parieto-occipital ICH. Xarelto reversed with Andexxa, DDDAVP given for ASA use. Lopressor 2.5 IVP given for episode of Afib with RVR. Repeat CTH stable ICH.  6/12: Cardene gtt d/c'd overnight. MRI Brain w/wo completed - pending official review. Keppra switched to depakote due to agitation. Home meds resumed. 24hr CTH completed, stable left parieto-occipital ICH.    Vital Signs Last 24 Hrs  T(C): 37.3 (12 Jun 2025 12:00), Max: 37.3 (12 Jun 2025 07:41)  T(F): 99.2 (12 Jun 2025 12:00), Max: 99.2 (12 Jun 2025 12:00)  HR: 78 (12 Jun 2025 18:00) (77 - 162)  BP: 148/82 (12 Jun 2025 18:00) (100/65 - 160/95)  BP(mean): 104 (12 Jun 2025 18:00) (67 - 115)  RR: 16 (12 Jun 2025 18:00) (14 - 34)  SpO2: 100% (12 Jun 2025 18:00) (93% - 100%)    Parameters below as of 12 Jun 2025 16:00  Patient On (Oxygen Delivery Method): room air      PHYSICAL EXAM:  GENERAL: NAD  HEAD:  Atraumatic, normocephalic  GLENDA COMA SCORE: E-4 V-4 M-6 =14  MENTAL STATUS: AAO x2 (self/place); Awake; Opens eyes spontaneously; following simple commands  CRANIAL NERVES: PERRL. Right homonymous hemianopsia, Face symmetric, tongue midline. Speech clear. Baseline decreased hearing on left  MOTOR: MOREL x4 spontaneously, 5/5 strength in UE b/l throughout, 5/5 LLE, 4+/5 RLE with RLE drift.  SENSATION: Decreased sensation throughout RLE  CHEST/LUNG: Non-labored breathing on room air   SKIN: Warm, dry    LABS:                        14.8   8.17  )-----------( 182      ( 12 Jun 2025 06:00 )             45.5     06-12    138  |  104  |  15.1  ----------------------------<  98  3.5   |  20.0[L]  |  0.98    Ca    8.5      12 Jun 2025 06:00  Phos  3.1     06-12  Mg     1.9     06-12    TPro  6.8  /  Alb  3.3  /  TBili  1.5  /  DBili  x   /  AST  18  /  ALT  8   /  AlkPhos  81  06-12    PT/INR - ( 12 Jun 2025 06:00 )   PT: 16.1 sec;   INR: 1.43 ratio         PTT - ( 12 Jun 2025 06:00 )  PTT:30.2 sec  Urinalysis Basic - ( 12 Jun 2025 06:00 )    Color: x / Appearance: x / SG: x / pH: x  Gluc: 98 mg/dL / Ketone: x  / Bili: x / Urobili: x   Blood: x / Protein: x / Nitrite: x   Leuk Esterase: x / RBC: x / WBC x   Sq Epi: x / Non Sq Epi: x / Bacteria: x        06-11 @ 07:01  -  06-12 @ 07:00  --------------------------------------------------------  IN: 1152.5 mL / OUT: 1150 mL / NET: 2.5 mL    06-12 @ 07:01  - 06-12 @ 18:49  --------------------------------------------------------  IN: 1235 mL / OUT: 600 mL / NET: 635 mL        ASSESSMENT:  79yo M with PMHx GERD, HTN, BPH, CAD on baby aspirin, s/p CABG 2012, afib on xarelto s/p ablation, presented to [facility] ON 6/11 with confusion and new onset vision loss. CTH with findings of an acute left parieto-occipital ICH. Andexxa given for xarelto reversal and DDAVP for aspirin reversal. NIHSS 5. ICH score 1. Repeat CTH x2 stable    PLAN:  NEURO:  - q2h neuro checks, q2h vitals  - MRI brain w/wo completed - official read pending   - 24hr CTH stable  - Depakote 500 BID for seizure ppx  - Pain control prn: avoid oversedation  - Activity: mobilize as tolerated    PULM:  - SpO2 goal > 92%  - Incentive spirometry    CV:  - SBP goal 100-160  - Continue Metoprolol 50mg qd, Entresto 24-26mg, Lipitor 40mg  - PRN: Hydralazine, Labetalol  - TTE  - LED    RENAL:  - Voiding  - Oxybutynin 15    GI:  - DASH  - GI prophylaxis: Protonix (home med)  - Bowel regimen: Senna, Miralax. LBM 6/12    ENDO:   - Goal euglycemia (-180)  - ISS    HEME/ONC:  - VTE prophylaxis: SCDs    ID:  - Monitor temperature and WBC    Discussed with Dr. Brooke and Dr. Donohue TRANSFER TO STROKE NEUROLOGY STEPDOWN    HPI:  81yo M with PMHx GERD, HTN, BPH, CAD on baby aspirin, s/p CABG 2012, afib on xarelto s/p ablation, presented to [facility] 6/11/25 with confusion and new onset vision loss. Per patient's wife, Pt returned from a doctor's appointment this afternoon in his usual state of health, LKN 12pm. A few hours after around 4pm, pt started experiencing dizziness, memory loss, forgetfulness, and vision loss. Pt was walking in the wrong rooms and placing items in incorrect places. CTH revealed an acute left parieto-occipital ICH. 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 Pt was given andexxa for xarelto reversal. SBP also noted to be in 200s, started on cardene drip. Pt denies headaches, N/V/D, CP, SOB, neck pain, recent injury to head/neck. NIHSS 5. ICH score 1. MRS 0. Pt was transferred to [facility] for further management and admitted to NSICU.      HOSPITAL COURSE:  6/11: 80y Male presented to [facility] on 6/11/25 with AMS and sudden onset vision loss, CTH revealed an acute left parieto-occipital ICH. Xarelto reversed with Andexxa, DDDAVP given for ASA use. Lopressor 2.5 IVP given for episode of Afib with RVR. Repeat CTH stable ICH.  6/12: Cardene gtt d/c'd overnight. MRI Brain w/wo completed - pending official review. Keppra switched to depakote due to agitation. Home meds resumed. 24hr CTH completed, stable left parieto-occipital ICH.    Vital Signs Last 24 Hrs  T(C): 37.3 (12 Jun 2025 12:00), Max: 37.3 (12 Jun 2025 07:41)  T(F): 99.2 (12 Jun 2025 12:00), Max: 99.2 (12 Jun 2025 12:00)  HR: 78 (12 Jun 2025 18:00) (77 - 162)  BP: 148/82 (12 Jun 2025 18:00) (100/65 - 160/95)  BP(mean): 104 (12 Jun 2025 18:00) (67 - 115)  RR: 16 (12 Jun 2025 18:00) (14 - 34)  SpO2: 100% (12 Jun 2025 18:00) (93% - 100%)    Parameters below as of 12 Jun 2025 16:00  Patient On (Oxygen Delivery Method): room air      PHYSICAL EXAM:  GENERAL: NAD  HEAD:  Atraumatic, normocephalic  GLENDA COMA SCORE: E-4 V-4 M-6 =14  MENTAL STATUS: AAO x2 (self/place); Awake; Opens eyes spontaneously; following simple commands  CRANIAL NERVES: PERRL. Right homonymous hemianopsia, Face symmetric, tongue midline. Speech clear. Baseline decreased hearing on left  MOTOR: MOREL x4 spontaneously, 5/5 strength in UE b/l throughout, 5/5 LLE, 4+/5 RLE with RLE drift.  SENSATION: Decreased sensation throughout RLE  CHEST/LUNG: Non-labored breathing on room air   SKIN: Warm, dry    LABS:                        14.8   8.17  )-----------( 182      ( 12 Jun 2025 06:00 )             45.5     06-12    138  |  104  |  15.1  ----------------------------<  98  3.5   |  20.0[L]  |  0.98    Ca    8.5      12 Jun 2025 06:00  Phos  3.1     06-12  Mg     1.9     06-12    TPro  6.8  /  Alb  3.3  /  TBili  1.5  /  DBili  x   /  AST  18  /  ALT  8   /  AlkPhos  81  06-12    PT/INR - ( 12 Jun 2025 06:00 )   PT: 16.1 sec;   INR: 1.43 ratio         PTT - ( 12 Jun 2025 06:00 )  PTT:30.2 sec  Urinalysis Basic - ( 12 Jun 2025 06:00 )    Color: x / Appearance: x / SG: x / pH: x  Gluc: 98 mg/dL / Ketone: x  / Bili: x / Urobili: x   Blood: x / Protein: x / Nitrite: x   Leuk Esterase: x / RBC: x / WBC x   Sq Epi: x / Non Sq Epi: x / Bacteria: x        06-11 @ 07:01  -  06-12 @ 07:00  --------------------------------------------------------  IN: 1152.5 mL / OUT: 1150 mL / NET: 2.5 mL    06-12 @ 07:01  - 06-12 @ 18:49  --------------------------------------------------------  IN: 1235 mL / OUT: 600 mL / NET: 635 mL        ASSESSMENT:  81yo M with PMHx GERD, HTN, BPH, CAD on baby aspirin, s/p CABG 2012, afib on xarelto s/p ablation, presented to [facility] [place] 6/11 with confusion and new onset vision loss. CTH with findings of an acute left parieto-occipital ICH. Andexxa given for xarelto reversal and DDAVP for aspirin reversal. NIHSS 5. ICH score 1. Repeat CTH x2 stable    PLAN:  NEURO:  - q2h neuro checks, q2h vitals  - MRI brain w/wo completed - official read pending   - 24hr CTH stable  - Depakote 500 BID for seizure ppx  - Pain control prn: avoid oversedation  - Activity: mobilize as tolerated    PULM:  - SpO2 goal > 92%  - Incentive spirometry    CV:  - SBP goal 100-160  - Continue Metoprolol 50mg qd, Entresto 24-26mg, Lipitor 40mg  - PRN: Hydralazine, Labetalol  - TTE  - LED    RENAL:  - Voiding  - Oxybutynin 15    GI:  - DASH  - GI prophylaxis: Protonix (home med)  - Bowel regimen: Senna, Miralax. LBM 6/12    ENDO:   - Goal euglycemia (-180)  - ISS    HEME/ONC:  - VTE prophylaxis: SCDs    ID:  - Monitor temperature and WBC    Discussed with Dr. Brooke and Dr. Donohue    ---------------------------------------------------------------    ATTENDING ATTESTATION    81yo M with acute L parietooccipital ICH with vasogenic edema, etiology - CAA vs HTN.  Coagulopathy due to anti-thrombotics - on ASA and Xarelto - s/p reversal.  PMHx GERD, HTN, BPH, CAD s/p CABG 2012, afib.  Remains clinically and radiographically stable.    PLAN:  - cont neuro checks, delirium precautions  - stroke core measures  - switched to Depakote 500 BID for seizure ppx (additional benefit of mood stabilization)  - Pain control prn: avoid oversedation  - Activity: mobilize as tolerated, OT/PT, OOB  - maintain -160, avoid significant BP fluctuations; restarted on home GDMT meds, cont statin  - SpO2 goal > 92%, incentive spirometry  - monitor e-lytes, UOP; d/c IV fluids; resume Oxybutynin  - diet as tolerated, BM regimen, cont home PPI  - Goal euglycemia (-180), ISS  - VTE prophylaxis: SCDs, hold anti-thrombotics for now as fresh ICH, re-eval in 24 hours for chemoppx  - stable for transfer to SDU    Time spent for ICU management - 55 minutes, critical care time.  Pt was considered critically ill and at high risk of rapid deterioration/death due to abovementioned conditions.   Required critical care interventions - ongoing frequent evaluations, interventions and management adjustment by the Attending and ICU team, - and included review of relevant history, clinical examination, review of data and images, discussion of treatment with the multidisciplinary team and any consultants involved in this patient’s care as well as family discussion.

## 2025-06-12 NOTE — CONSULT NOTE ADULT - NS ATTEND AMEND GEN_ALL_CORE FT
NGSY Attg:    see above    patient seen and examined    agree with above    imaging reviewed    plan of care determined for ICH  continue supportive care per ICU  MRI pending

## 2025-06-12 NOTE — CONSULT NOTE ADULT - SUBJECTIVE AND OBJECTIVE BOX
Preliminary note, official note pending attending review/signature.  Seen and examined by Stroke team attending/team, assessment/ plan as discussed with stroke team attending/team as noted.                                [facility] Stroke Team    CC: vision loss, confusion   HPI:  81yo M with PMHx GERD, HTN, BPH, CAD on baby aspirin, s/p CABG 2012, afib on xarelto s/p ablation, presented to [facility]  with confusion and new onset vision loss. Per patient's wife, Pt returned from a doctor's appointment afternoon in his usual state of health, last known well 12pm 6/11/25. A few hours after around 4pm, Pt started experiencing dizziness, memory loss, forgetfulness, and vision loss. Pt was walking in the wrong rooms and placing items in incorrect places.  Pt was transferred to [facility] for further management after being found to have ICH.     PAST MEDICAL & SURGICAL HISTORY:  CAD in native artery  s/p cabg  Afib  s/p carsioversion May 2020  HTN (hypertension)  HLD (hyperlipidemia)  BPH with urinary obstruction  Obesity  Urolithiasis  S/P CABG x 4    MEDICATIONS  (STANDING):  atorvastatin 40 milliGRAM(s) Oral at bedtime  chlorhexidine 2% Cloths 1 Application(s) Topical daily  dextrose 50% Injectable 25 Gram(s) IV Push once  divalproex  milliGRAM(s) Oral every 12 hours  insulin lispro (ADMELOG) corrective regimen sliding scale   SubCutaneous three times a day before meals  metoprolol succinate ER 50 milliGRAM(s) Oral daily  oxybutynin XL 15 milliGRAM(s) Oral daily  pantoprazole    Tablet 40 milliGRAM(s) Oral before breakfast  polyethylene glycol 3350 17 Gram(s) Oral daily  sacubitril 24 mG/valsartan 26 mG 1 Tablet(s) Oral two times a day  senna 2 Tablet(s) Oral at bedtime  sodium chloride 0.9%. 1000 milliLiter(s) (75 mL/Hr) IV Continuous <Continuous>    MEDICATIONS  (PRN):  acetaminophen     Tablet .. 650 milliGRAM(s) Oral every 6 hours PRN Temp greater or equal to 38C (100.4F), Mild Pain (1 - 3)  hydrALAZINE Injectable 10 milliGRAM(s) IV Push every 2 hours PRN SBP>160  labetalol Injectable 10 milliGRAM(s) IV Push every 2 hours PRN SBP>160      Allergies  Biaxin (Rash; Urticaria; Nausea)  Keflex (Hives)  Intolerances    SOCIAL HISTORY:  no tob,   no alcohol   no drugs    FAMILY HISTORY:  FH: CVA (cerebrovascular accident) (Father)    ROS: 14 point ROS negative other than what is present in HPI or below    Vital Signs Last 24 Hrs  T(C): 37.3 (12 Jun 2025 12:00), Max: 37.3 (12 Jun 2025 07:41)  T(F): 99.2 (12 Jun 2025 12:00), Max: 99.2 (12 Jun 2025 12:00)  HR: 78 (12 Jun 2025 14:00) (78 - 162)  BP: 147/90 (12 Jun 2025 14:00) (100/65 - 160/95)  BP(mean): 107 (12 Jun 2025 14:00) (67 - 115)  RR: 25 (12 Jun 2025 14:00) (14 - 34)  SpO2: 99% (12 Jun 2025 14:00) (93% - 100%)    Parameters below as of 12 Jun 2025 12:00  Patient On (Oxygen Delivery Method): room air          Physical Exam:  General: No acute distress.     Detailed Neurologic Exam:    Mental status: sleeping, awakens readily, right neglect,  The patient is oriented to person, place, time. The patient is oriented to current events. The patient is able to name objects, follow commands, repeat sentences.    Cranial nerves: RHHA, Pupils equal and react symmetrically to light. There is no visual field deficit to confrontation. Extraocular motion is full with no nystagmus. There is no ptosis. Facial sensation is intact.      Motor: There is normal bulk and tone.  There is no tremor.  Strength is 4/5 in the right arm with drift that does not hit bed  and 4/5 leg- drifts to bed .   Strength is 5/5 in the left arm and leg.    Sensation: Intact to light touch and pin in 4 extremities    Cerebellar: There is no dysmetria on finger to nose testing.    Gait : deferred    [facility] SS:  DATE: 6/12/25    1A: Level of consciousness (0-3): 1  1B: Questions (0-2):   1C: Commands (0-2):   2: Gaze (0-2):   3: Visual fields (0-3): 2  4: Facial palsy (0-3):   MOTOR:  5A: Left arm motor drift (0-4):   5B: Right arm motor drift (0-4): 1  6A: Left leg motor drift (0-4):   6B: Right leg motor drift (0-4): 2  7: Limb ataxia (0-2):   SENSORY:  8: Sensation (0-2):   SPEECH:  9: Language (0-3):   10: Dysarthria (0-2):   EXTINCTION:  11: Extinction/inattention (0-2): 1    TOTAL SCORE:  7    prehospital mRS=  0      LABS:                         14.8   8.17  )-----------( 182      ( 12 Jun 2025 06:00 )             45.5       06-12    138  |  104  |  15.1  ----------------------------<  98  3.5   |  20.0[L]  |  0.98    Ca    8.5      12 Jun 2025 06:00  Phos  3.1     06-12  Mg     1.9     06-12    TPro  6.8  /  Alb  3.3  /  TBili  1.5  /  DBili  x   /  AST  18  /  ALT  8   /  AlkPhos  81  06-12      PT/INR - ( 12 Jun 2025 06:00 )   PT: 16.1 sec;   INR: 1.43 ratio         PTT - ( 12 Jun 2025 06:00 )  PTT:30.2 sec    06-12 Chol 142 LDL -65 - HDL 64 Trig 66    A1C: 5.7        RADIOLOGY & ADDITIONAL STUDIES (independently reviewed unless otherwise noted):  CT Head No Cont (06.11.25 @ 22:47)   Stable left posterior parietal-occipital paracentral intraparenchymal   hematoma    6/11/25  CT HEAD:  Left posterior parietal-occipital paracentral intraparenchymal hematoma measuring 3.4 x 3.8 x 3.5 cm with surrounding edema and mass effect on the splenium of the corpus callosum and atrium of the left lateral ventricle. Consider further evaluation via pre and postcontrast MR imaging of the brain, provided the patient has no contraindications.    CTA NECK:  1. No evidence of significant stenosis or occlusion.  2. Right lobe of the thyroid gland is prominent appearance measuring 8.4 x 4.8 x 5.1 cm in diameter. The thyroid isthmus is also prominent appearance measuring up to 3 cm in diameter. Several nodules are appreciated within the bilateral lobes of the thyroid gland. Findings include a 1.7 x 1.6 cm nodule with peripheral calcification within the region of the thyroid isthmus. Consider follow-up dedicated thyroid ultrasound assessment on a nonemergent basis for further characterization, as clinically indicated and provided the patient has no contraindications.    CTA HEAD:  1. No large vessel occlusion.  2. Mild stenosis in association with the distal intracranial bilateral vertebral arteries..  3. Hypoplastic left P1 segment with a fetal origin of the left posterior cerebral artery.  4. Left A1 segment is hypoplastic. Bilateral A2 segments appear to fill via the right A1 segment.  5.No aneurysm identified. Tiny aneurysms can be beyond the resolution of CTA technique. No discrete AVM appreciated.                                [facility] Stroke Team    CC: vision loss, confusion   HPI:  81yo M with PMHx GERD, HTN, BPH, CAD on baby aspirin, s/p CABG 2012, afib on xarelto s/p ablation, presented to [place]  with confusion and new onset vision loss. Per patient's wife, Pt returned from a doctor's appointment afternoon in his usual state of health, last known well 12pm 6/11/25. A few hours after around 4pm, Pt started experiencing dizziness, memory loss, forgetfulness, and vision loss. Pt was walking in the wrong rooms and placing items in incorrect places.  Pt was transferred to [facility] for further management after being found to have ICH.     PAST MEDICAL & SURGICAL HISTORY:  CAD in native artery  s/p cabg  Afib  s/p carsioversion May 2020  HTN (hypertension)  HLD (hyperlipidemia)  BPH with urinary obstruction  Obesity  Urolithiasis  S/P CABG x 4    MEDICATIONS  (STANDING):  atorvastatin 40 milliGRAM(s) Oral at bedtime  chlorhexidine 2% Cloths 1 Application(s) Topical daily  dextrose 50% Injectable 25 Gram(s) IV Push once  divalproex  milliGRAM(s) Oral every 12 hours  insulin lispro (ADMELOG) corrective regimen sliding scale   SubCutaneous three times a day before meals  metoprolol succinate ER 50 milliGRAM(s) Oral daily  oxybutynin XL 15 milliGRAM(s) Oral daily  pantoprazole    Tablet 40 milliGRAM(s) Oral before breakfast  polyethylene glycol 3350 17 Gram(s) Oral daily  sacubitril 24 mG/valsartan 26 mG 1 Tablet(s) Oral two times a day  senna 2 Tablet(s) Oral at bedtime  sodium chloride 0.9%. 1000 milliLiter(s) (75 mL/Hr) IV Continuous <Continuous>    MEDICATIONS  (PRN):  acetaminophen     Tablet .. 650 milliGRAM(s) Oral every 6 hours PRN Temp greater or equal to 38C (100.4F), Mild Pain (1 - 3)  hydrALAZINE Injectable 10 milliGRAM(s) IV Push every 2 hours PRN SBP>160  labetalol Injectable 10 milliGRAM(s) IV Push every 2 hours PRN SBP>160      Allergies  Biaxin (Rash; Urticaria; Nausea)  Keflex (Hives)  Intolerances    SOCIAL HISTORY:  no tob,   no alcohol   no drugs    FAMILY HISTORY:  FH: CVA (cerebrovascular accident) (Father)    ROS: 14 point ROS negative other than what is present in HPI or below    Vital Signs Last 24 Hrs  T(C): 37.3 (12 Jun 2025 12:00), Max: 37.3 (12 Jun 2025 07:41)  T(F): 99.2 (12 Jun 2025 12:00), Max: 99.2 (12 Jun 2025 12:00)  HR: 78 (12 Jun 2025 14:00) (78 - 162)  BP: 147/90 (12 Jun 2025 14:00) (100/65 - 160/95)  BP(mean): 107 (12 Jun 2025 14:00) (67 - 115)  RR: 25 (12 Jun 2025 14:00) (14 - 34)  SpO2: 99% (12 Jun 2025 14:00) (93% - 100%)    Parameters below as of 12 Jun 2025 12:00  Patient On (Oxygen Delivery Method): room air          Physical Exam:  General: No acute distress.     Detailed Neurologic Exam:    Mental status: sleeping, awakens readily, right neglect,  The patient is oriented to person, place, time. The patient is oriented to current events. The patient is able to name objects, follow commands, repeat sentences.    Cranial nerves: RHHA, Pupils equal and react symmetrically to light. There is no visual field deficit to confrontation. Extraocular motion is full with no nystagmus. There is no ptosis. Facial sensation is intact.      Motor: There is normal bulk and tone.  There is no tremor.  Strength is 4/5 in the right arm with drift that does not hit bed  and 4/5 leg- drifts to bed .   Strength is 5/5 in the left arm and leg.    Sensation: Intact to light touch and pin in 4 extremities    Cerebellar: There is no dysmetria on finger to nose testing.    Gait : deferred    [facility] SS:  DATE: 6/12/25    1A: Level of consciousness (0-3): 1  1B: Questions (0-2):   1C: Commands (0-2):   2: Gaze (0-2):   3: Visual fields (0-3): 2  4: Facial palsy (0-3):   MOTOR:  5A: Left arm motor drift (0-4):   5B: Right arm motor drift (0-4): 1  6A: Left leg motor drift (0-4):   6B: Right leg motor drift (0-4): 2  7: Limb ataxia (0-2):   SENSORY:  8: Sensation (0-2):   SPEECH:  9: Language (0-3):   10: Dysarthria (0-2):   EXTINCTION:  11: Extinction/inattention (0-2): 1    TOTAL SCORE:  7    prehospital mRS=  0      LABS:                         14.8   8.17  )-----------( 182      ( 12 Jun 2025 06:00 )             45.5       06-12    138  |  104  |  15.1  ----------------------------<  98  3.5   |  20.0[L]  |  0.98    Ca    8.5      12 Jun 2025 06:00  Phos  3.1     06-12  Mg     1.9     06-12    TPro  6.8  /  Alb  3.3  /  TBili  1.5  /  DBili  x   /  AST  18  /  ALT  8   /  AlkPhos  81  06-12      PT/INR - ( 12 Jun 2025 06:00 )   PT: 16.1 sec;   INR: 1.43 ratio         PTT - ( 12 Jun 2025 06:00 )  PTT:30.2 sec    06-12 Chol 142 LDL -65 - HDL 64 Trig 66    A1C: 5.7        RADIOLOGY & ADDITIONAL STUDIES (independently reviewed unless otherwise noted):  CT Head No Cont (06.11.25 @ 22:47)   Stable left posterior parietal-occipital paracentral intraparenchymal   hematoma    6/11/25  CT HEAD:  Left posterior parietal-occipital paracentral intraparenchymal hematoma measuring 3.4 x 3.8 x 3.5 cm with surrounding edema and mass effect on the splenium of the corpus callosum and atrium of the left lateral ventricle. Consider further evaluation via pre and postcontrast MR imaging of the brain, provided the patient has no contraindications.    CTA NECK:  1. No evidence of significant stenosis or occlusion.  2. Right lobe of the thyroid gland is prominent appearance measuring 8.4 x 4.8 x 5.1 cm in diameter. The thyroid isthmus is also prominent appearance measuring up to 3 cm in diameter. Several nodules are appreciated within the bilateral lobes of the thyroid gland. Findings include a 1.7 x 1.6 cm nodule with peripheral calcification within the region of the thyroid isthmus. Consider follow-up dedicated thyroid ultrasound assessment on a nonemergent basis for further characterization, as clinically indicated and provided the patient has no contraindications.    CTA HEAD:  1. No large vessel occlusion.  2. Mild stenosis in association with the distal intracranial bilateral vertebral arteries..  3. Hypoplastic left P1 segment with a fetal origin of the left posterior cerebral artery.  4. Left A1 segment is hypoplastic. Bilateral A2 segments appear to fill via the right A1 segment.  5.No aneurysm identified. Tiny aneurysms can be beyond the resolution of CTA technique. No discrete AVM appreciated.

## 2025-06-12 NOTE — CONSULT NOTE ADULT - ASSESSMENT
ASSESSMENT:     NEURO:   -Neurologically ---   -Continue close monitoring for neurologic deterioration    - Stroke neuro checks q _   - Permissive HTN or  SBP goal ____ avoiding rapid fluctuations and hypotension    -ANTITHROMBOTIC THERAPY:   -titrate statin to LDL goal less than 70  -MRI Brain w/o, MRA Head w/o and Neck w/contrast  -Dysphagia screen: pass/fail   -Physical therapy/OT/Speech eval/treatment.   -check TTE, cardiac monitoring w/ telemetry for now, further evaluation pending findings of noted workup  , +/- ILR             - patient should have all age and risk appropriate malignancy screenings with PCP or sooner if clinically suspected    -DVT ppx: Heparin s.c [] LMWH [] SCD[]    -maintain adequate hydration    -Na Goal: 135-145   -monitor for si/sx of infection   -Check LDL/A1C  -Stroke education     OTHER:  condition and plan of care d/w patient, questions and concerns addressed.     DISPOSITION: Rehab or home depending on PT eval once stable and workup is complete    CORE MEASURES     Admission NIHSS:     Tenecteplase : [] YES [] NO     LDL/HDL/A1C:     Depression Screen- if depression hx and/or present     Statin Therapy:     Dysphagia Screen: [] PASS [] FAIL     Smoking in the past 12 months [] YES [] NO     Afib [] YES [] NO     Stroke Education [] YES [] NO     Diabetes [] YES [] NO     If patient has Diabetes, are they prescribed cardioprotective antihyperglycemic medication (GLP-1 receptor agonist or SGLT-2 inhibitor) on  discharge [] YES [] NO         If NO:   [] Patient/Family refusal after risk/benefit discussion,   [] Allergy/Intolerance,   [] Not appropriate at this time, need to follow up with PCP or Endocrinology         outpatient to consider initiation after further evaluation of clinical status,   [] Hga1c >/= 9% indicating poor glycemic control and therefore would not be appropriate at         this time,   [] Needs prior authorization - needs follow up with PCP or endocrine to obtain approval,   [] SGLT-2 inhibitor not appropriate given recent ketoacidosis,  [] History of bladder cancer (SGLT-2 contraindicated),   [] History or Family History of medullary thyroid tumor (GLP-1 receptor agonist contraindicated),   [] History of pancreatitis (GLP-1 receptor agonist contraindicated)   ASSESSMENT: 81yo M with PMHx GERD, HTN, BPH, CAD on baby aspirin, s/p CABG 2012, afib on xarelto s/p ablation, [facility]  with confusion and new onset vision loss. Per patient's wife, Pt returned from a doctor's appointment afternoon in his usual state of health, last known well 12pm 6/11/25. A few hours after around 4pm, Pt started experiencing dizziness, memory loss, forgetfulness, and vision loss. Pt was walking in the wrong rooms and placing items in incorrect places.   CT head with left parietal-occipital IPH, CT angiogram head/neck with multifocal intracranial atherosclerotic disease.     Impression: Left posterior-occipital IPH.  Etiology concerning for hypertension with underlying coagulopathy.  Would rule out underlying lesion and/or infarct.     NEURO:   -Continue close monitoring for neurologic deterioration  in the setting of cerebral edema with mass effect and brain compression.   - Stroke neuro checks q 1 hour    - Normotension as tolerated avoiding rapid fluctuations and hypotension, overall < 160/90   -ANTITHROMBOTIC THERAPY: on hold in setting of ICH, further timeline for initiation based on clinical course and serial imaging in coordination with nsx team   -home statin regimen if applicable   -MRI Brain w/ pending read   -Dysphagia screen: diet per protocol    -Physical therapy/OT/Speech eval/treatment.   -check TTE, cardiac monitoring w/ telemetry for now, further evaluation pending findings of noted workup  , rate control      - patient should have all age and risk appropriate malignancy screenings with PCP or sooner if clinically suspected    -DVT ppx: Heparin s.c [] LMWH [] SCD[x]    -maintain adequate hydration    -Na Goal: 135-145   -monitor for si/sx of infection   - LDL/A1C as noted   -Stroke education     OTHER:  condition and plan of care d/w patient, questions and concerns addressed.     DISPOSITION: Rehab or home depending on PT eval once stable and workup is complete    CORE MEASURES     Admission NIHSS:     Tenecteplase : [] YES [x] NO     LDL/HDL/A1C: as noted      Depression Screen- if depression hx and/or present     Statin Therapy: as noted      Dysphagia Screen: [x] PASS [] FAIL     Smoking in the past 12 months [] YES [] NO pending      Afib [x] YES [] NO     Stroke Education [x] YES [] NO     Diabetes [] YES [x] NO    ASSESSMENT: 79yo M with PMHx GERD, HTN, BPH, CAD on baby aspirin, s/p CABG 2012, afib on xarelto s/p ablation, presented to [facility]  with confusion and new onset vision loss. Per patient's wife, Pt returned from a doctor's appointment afternoon in his usual state of health, last known well 12pm 6/11/25. A few hours after around 4pm, Pt started experiencing dizziness, memory loss, forgetfulness, and vision loss. Pt was walking in the wrong rooms and placing items in incorrect places.   CT head with left parietal-occipital IPH, CT angiogram head/neck with multifocal intracranial atherosclerotic disease.     Impression: Left posterior-occipital IPH.  Etiology concerning for hypertension with underlying coagulopathy vs CAA.  Would rule out underlying lesion and/or infarct.     NEURO:   -Continue close monitoring for neurologic deterioration  in the setting of cerebral edema with mass effect and brain compression.   - Stroke neuro checks q 1 hour    - Normotension as tolerated avoiding rapid fluctuations and hypotension, overall < 160/90   -ANTITHROMBOTIC THERAPY: on hold in setting of ICH, further timeline for initiation based on clinical course and serial imaging in coordination with nsx team   -home statin regimen if applicable   -MRI Brain w/ pending read   -Dysphagia screen: diet per protocol    -Physical therapy/OT/Speech eval/treatment.   -check TTE, cardiac monitoring w/ telemetry for now, further evaluation pending findings of noted workup  , rate control      - patient should have all age and risk appropriate malignancy screenings with PCP or sooner if clinically suspected    -DVT ppx: Heparin s.c [] LMWH [] SCD[x]    -maintain adequate hydration    -Na Goal: 135-145   -monitor for si/sx of infection   - LDL/A1C as noted   -Stroke education     OTHER:  condition and plan of care d/w patient, questions and concerns addressed.     DISPOSITION: Rehab or home depending on PT eval once stable and workup is complete    CORE MEASURES     Admission NIHSS:     Tenecteplase : [] YES [x] NO     LDL/HDL/A1C: as noted      Depression Screen- if depression hx and/or present     Statin Therapy: as noted      Dysphagia Screen: [x] PASS [] FAIL     Smoking in the past 12 months [] YES [] NO pending      Afib [x] YES [] NO     Stroke Education [x] YES [] NO     Diabetes [] YES [x] NO

## 2025-06-12 NOTE — PHARMACOTHERAPY INTERVENTION NOTE - COMMENTS
Outpatient Medication Review updated using [facility] outpatient medication fill records and [facility] fill records. Confirmed with patient's family at bedside/bag of medications.     HOME MEDICATIONS:  aspirin 81 mg oral capsule: 1 cap(s) orally once a day (12 Jun 2025 15:12)  atorvastatin 40 mg oral tablet: 1 tab(s) orally once a day (12 Jun 2025 15:12)  dexlansoprazole 60 mg oral delayed release capsule: 1 cap(s) orally once a day (12 Jun 2025 15:12)  hydrocodone-acetaminophen 10 mg-325 mg oral tablet: Take 1 to 2 tabs by mouth every 4 to 6 hrs if needed for pain (MDD 12) (12 Jun 2025 15:10)  hydrocodone-homatropine 5 mg-1.5 mg/5 mL oral syrup: 5 milliliter(s) orally 4 times a day (12 Jun 2025 15:12)  metoprolol succinate 50 mg oral tablet, extended release: 1 tab(s) orally once a day (at bedtime) (12 Jun 2025 15:12)  oxybutynin 15 mg/24 hr oral tablet, extended release: 1 tab(s) orally once a day (12 Jun 2025 15:12)  sacubitril-valsartan 24 mg-26 mg oral tablet: 1 tab(s) orally 2 times a day (12 Jun 2025 15:10)  Xarelto 20 mg oral tablet: 1 tab(s) orally once a day (in the evening) (12 Jun 2025 15:12)  zolpidem 5 mg oral tablet: 1 tab(s) orally once a day (at bedtime) as needed for  insomnia (12 Jun 2025 15:12)

## 2025-06-12 NOTE — PATIENT PROFILE ADULT - FALL HARM RISK - RISK INTERVENTIONS

## 2025-06-12 NOTE — CONSULT NOTE ADULT - ASSESSMENT
79yo M with PMHx GERD, HTN, BPH, CAD on baby aspirin, s/p CABG 2012, afib on xarelto s/p ablation, presented to [facility] with confusion and new onset vision loss. CTH with findings of an acute left parieto-occipital ICH. Andexxa given for xarelto reversal, and DDAVP given for aspirin reversal. NIHSS 5. ICH score 1. Pt was transferred to [facility] for further management.     PLAN:   -neuro checks q1h  -stroke core measures (a1c, lipid profile, TTE)  -keppra 500mg bid for seizure prophylaxis  -obtain repeat 6h CTH and repeat 24h CTH  -MRI brain w/wo  -stroke neurology consult  --160, cardene gtt  -continue home metoprolol  -NPO for now, IVF  -DVT ppx; SCDs for now    Plan discussed with Dr. Corado and Dr. Carter

## 2025-06-12 NOTE — DIETITIAN INITIAL EVALUATION ADULT - NS FNS DIET ORDER
Diet, NPO:   Except Medications  With Ice Chips/Sips of Water     Special Instructions for Nursing:  If failed dysphagia screen, patient to remain NPO (06-12-25 @ 01:25)

## 2025-06-12 NOTE — DIETITIAN INITIAL EVALUATION ADULT - PERTINENT LABORATORY DATA
06-12    138  |  104  |  15.1  ----------------------------<  98  3.5   |  20.0[L]  |  0.98    Ca    8.5      12 Jun 2025 06:00  Phos  3.1     06-12  Mg     1.9     06-12    TPro  6.8  /  Alb  3.3  /  TBili  1.5  /  DBili  x   /  AST  18  /  ALT  8   /  AlkPhos  81  06-12  POCT Blood Glucose.: 89 mg/dL (06-12-25 @ 05:44)  A1C with Estimated Average Glucose Result: 5.7 % (06-12-25 @ 00:00)

## 2025-06-12 NOTE — DISCHARGE NOTE NURSING/CASE MANAGEMENT/SOCIAL WORK - FINANCIAL ASSISTANCE
[facility] provides services at a reduced cost to those who are determined to be eligible through [facility]’s financial assistance program. Information regarding [facility]’s financial assistance program can be found by going to [email or web address] or by calling [phone].

## 2025-06-12 NOTE — DIETITIAN INITIAL EVALUATION ADULT - ORAL INTAKE PTA/DIET HISTORY
Pt with confusion noted; sleeping soundly during visit; unable to interview at this time. NPO status maintained.

## 2025-06-12 NOTE — DIETITIAN INITIAL EVALUATION ADULT - OTHER INFO
Pt is a 80 year old M with PMHx GERD, HTN, BPH, CAD on baby aspirin, s/p CABG 2012, afib on xarelto s/p ablation, presented to [facility] with confusion and new onset vision loss. CTH with findings of an acute left parieto-occipital ICH.  Pt was transferred to [facility] for further management.

## 2025-06-12 NOTE — CONSULT NOTE ADULT - SUBJECTIVE AND OBJECTIVE BOX
HISTORY OF PRESENT ILLNESS:   79yo M with PMHx GERD, HTN, BPH, CAD on baby aspirin, s/p CABG 2012, afib on xarelto s/p ablation, presented to [facility] [place] today with confusion and new onset vision loss. Per patient's wife, Pt returned from a doctor's appointment this afternoon in his usual state of health, LKN 12pm. A few hours after around 4pm, Pt started experiencing dizziness, memory loss, forgetfulness, and vision loss. Pt was walking in the wrong rooms and placing items in incorrect places. CTH revealed an acute left parieto-occipital ICH. 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 Pt was given andexxa for xarelto reversal. SBP also noted to be in 200s, started on cardene drip. Pt denies headaches, N/V/D, CP, SOB, neck pain, recent injury to head/neck. NIHSS 5. ICH score 1. MRS 0. Pt was transferred to [facility] for further management.     PAST MEDICAL & SURGICAL HISTORY:  CAD in native artery  s/p cabg 2012  Afib  s/p cardioversion May 2020  HTN (hypertension)  HLD (hyperlipidemia)  BPH with urinary obstruction  Obesity  Urolithiasis  S/P CABG x 4    FAMILY HISTORY:  FH: CVA (cerebrovascular accident) (Father)    SOCIAL HISTORY:  Tobacco Use: Denies  EtOH use:  Social  Substance: Denies    Allergies    Biaxin (Rash; Urticaria; Nausea)  Keflex (Hives)    Intolerances    REVIEW OF SYSTEMS  See HPI    MEDICATIONS:  Antibiotics:    Neuro:  acetaminophen     Tablet .. 650 milliGRAM(s) Oral every 6 hours PRN  levETIRAcetam  IVPB 500 milliGRAM(s) IV Intermittent every 12 hours    Anticoagulation:    OTHER:  chlorhexidine 2% Cloths 1 Application(s) Topical daily  dextrose 50% Injectable 25 Gram(s) IV Push once  dextrose Oral Gel 15 Gram(s) Oral once PRN  glucagon  Injectable 1 milliGRAM(s) IntraMuscular once  insulin lispro (ADMELOG) corrective regimen sliding scale   SubCutaneous three times a day before meals  metoprolol tartrate Injectable 5 milliGRAM(s) IV Push every 6 hours  niCARdipine Infusion 15 mG/Hr IV Continuous <Continuous>  pantoprazole  Injectable 40 milliGRAM(s) IV Push daily    IVF:  dextrose 5%. 1000 milliLiter(s) IV Continuous <Continuous>  sodium chloride 0.9%. 1000 milliLiter(s) IV Continuous <Continuous>      Vital Signs Last 24 Hrs  T(C): 37.2 (11 Jun 2025 22:54), Max: 37.2 (11 Jun 2025 22:54)  T(F): 99 (11 Jun 2025 22:54), Max: 99 (11 Jun 2025 22:54)  HR: 92 (11 Jun 2025 23:45) (92 - 162)  BP: 141/83 (11 Jun 2025 23:45) (121/93 - 157/91)  BP(mean): 102 (11 Jun 2025 23:45) (84 - 112)  RR: 24 (11 Jun 2025 23:45) (15 - 34)  SpO2: 96% (11 Jun 2025 23:45) (95% - 100%)    Parameters below as of 11 Jun 2025 22:54  Patient On (Oxygen Delivery Method): room air        PHYSICAL EXAM:  Constitutional: NAD, pt is comfortably sitting up in bed, on room air  Cardiovascular: Irregularly irregular rate and rhythm   Respiratory: lungs CTAB, no wheezing, rhonchi, or crackles   GI: normoactive BS to auscultation, abd soft, NTND   Neuro: AAOx2 (self and place), FC, confused speech   CNII-CXII: PERRL 3mm briskly reactive, EOMI b/l, face symmetric, tongue midline, right homonymous hemianopsia, baseline decreased hearing on left  Motor: MOREL x4 spontaneously, 5/5 strength in UE b/l throughout, 5/5 LLE, 4+/5 RLE with RLE drift. Decreased sensation throughout RLE    RADIOLOGY & ADDITIONAL STUDIES:    CTH 6/11/25:  Left posterior parietal-occipitalI PH measuring 3.4 x 3.8 x 3.5 cm w/ edema and mass effect on the splenium of the corpus callosum and atrium of the left lateral ventricle    CTA head/neck 6/11/25:  No LVO. Mild stenosis  distal intracranial bilateral vertebral arteries. Hypoplastic left P1 segment. Left A1 segment is hypoplastic. Bilateral A2 segments appear to fill via the right A1 segment. No aneurysm identified. No discrete AVM appreciated.

## 2025-06-12 NOTE — DISCHARGE NOTE NURSING/CASE MANAGEMENT/SOCIAL WORK - PATIENT PORTAL LINK FT
You can access the FollowMyHealth Patient Portal offered by [facility] by registering at the following website: [email or web address]. By joining [email or web address]’s FollowMyHealth portal, you will also be able to view your health information using other applications (apps) compatible with our system.

## 2025-06-13 ENCOUNTER — RESULT REVIEW (OUTPATIENT)
Age: 80
End: 2025-06-13

## 2025-06-13 LAB
ALBUMIN SERPL ELPH-MCNC: 4 G/DL — SIGNIFICANT CHANGE UP (ref 3.3–5.2)
ALP SERPL-CCNC: 87 U/L — SIGNIFICANT CHANGE UP (ref 40–120)
ALT FLD-CCNC: 10 U/L — SIGNIFICANT CHANGE UP
ANION GAP SERPL CALC-SCNC: 17 MMOL/L — SIGNIFICANT CHANGE UP (ref 5–17)
APPEARANCE UR: CLEAR — SIGNIFICANT CHANGE UP
AST SERPL-CCNC: 21 U/L — SIGNIFICANT CHANGE UP
BACTERIA # UR AUTO: ABNORMAL /HPF
BILIRUB SERPL-MCNC: 2 MG/DL — SIGNIFICANT CHANGE UP (ref 0.4–2)
BILIRUB UR-MCNC: NEGATIVE — SIGNIFICANT CHANGE UP
BUN SERPL-MCNC: 17 MG/DL — SIGNIFICANT CHANGE UP (ref 8–20)
CALCIUM SERPL-MCNC: 8.8 MG/DL — SIGNIFICANT CHANGE UP (ref 8.4–10.5)
CAST: 1 /LPF — SIGNIFICANT CHANGE UP (ref 0–4)
CHLORIDE SERPL-SCNC: 104 MMOL/L — SIGNIFICANT CHANGE UP (ref 96–108)
CO2 SERPL-SCNC: 16 MMOL/L — LOW (ref 22–29)
COLOR SPEC: YELLOW — SIGNIFICANT CHANGE UP
CREAT SERPL-MCNC: 1.04 MG/DL — SIGNIFICANT CHANGE UP (ref 0.5–1.3)
DIFF PNL FLD: NEGATIVE — SIGNIFICANT CHANGE UP
EGFR: 74 ML/MIN/1.73M2 — SIGNIFICANT CHANGE UP
EGFR: 74 ML/MIN/1.73M2 — SIGNIFICANT CHANGE UP
GLUCOSE BLDC GLUCOMTR-MCNC: 136 MG/DL — HIGH (ref 70–99)
GLUCOSE BLDC GLUCOMTR-MCNC: 82 MG/DL — SIGNIFICANT CHANGE UP (ref 70–99)
GLUCOSE SERPL-MCNC: 94 MG/DL — SIGNIFICANT CHANGE UP (ref 70–99)
GLUCOSE UR QL: NEGATIVE MG/DL — SIGNIFICANT CHANGE UP
HCT VFR BLD CALC: 46.6 % — SIGNIFICANT CHANGE UP (ref 39–50)
HGB BLD-MCNC: 15.2 G/DL — SIGNIFICANT CHANGE UP (ref 13–17)
KETONES UR QL: 15 MG/DL
LEUKOCYTE ESTERASE UR-ACNC: ABNORMAL
MAGNESIUM SERPL-MCNC: 2.2 MG/DL — SIGNIFICANT CHANGE UP (ref 1.6–2.6)
MCHC RBC-ENTMCNC: 25.3 PG — LOW (ref 27–34)
MCHC RBC-ENTMCNC: 32.6 G/DL — SIGNIFICANT CHANGE UP (ref 32–36)
MCV RBC AUTO: 77.5 FL — LOW (ref 80–100)
NITRITE UR-MCNC: NEGATIVE — SIGNIFICANT CHANGE UP
NRBC # BLD AUTO: 0 K/UL — SIGNIFICANT CHANGE UP (ref 0–0)
NRBC # FLD: 0 K/UL — SIGNIFICANT CHANGE UP (ref 0–0)
NRBC BLD AUTO-RTO: 0 /100 WBCS — SIGNIFICANT CHANGE UP (ref 0–0)
PH UR: 6 — SIGNIFICANT CHANGE UP (ref 5–8)
PHOSPHATE SERPL-MCNC: 3.1 MG/DL — SIGNIFICANT CHANGE UP (ref 2.4–4.7)
PLATELET # BLD AUTO: 177 K/UL — SIGNIFICANT CHANGE UP (ref 150–400)
PMV BLD: 10.7 FL — SIGNIFICANT CHANGE UP (ref 7–13)
POTASSIUM SERPL-MCNC: 4.2 MMOL/L — SIGNIFICANT CHANGE UP (ref 3.5–5.3)
POTASSIUM SERPL-SCNC: 4.2 MMOL/L — SIGNIFICANT CHANGE UP (ref 3.5–5.3)
PROT SERPL-MCNC: 7.4 G/DL — SIGNIFICANT CHANGE UP (ref 6.6–8.7)
PROT UR-MCNC: 300 MG/DL
RAPID RVP RESULT: SIGNIFICANT CHANGE UP
RBC # BLD: 6.01 M/UL — HIGH (ref 4.2–5.8)
RBC # FLD: 16.4 % — HIGH (ref 10.3–14.5)
RBC CASTS # UR COMP ASSIST: 0 /HPF — SIGNIFICANT CHANGE UP (ref 0–4)
SARS-COV-2 RNA SPEC QL NAA+PROBE: SIGNIFICANT CHANGE UP
SODIUM SERPL-SCNC: 136 MMOL/L — SIGNIFICANT CHANGE UP (ref 135–145)
SP GR SPEC: 1.02 — SIGNIFICANT CHANGE UP (ref 1–1.03)
SQUAMOUS # UR AUTO: 4 /HPF — SIGNIFICANT CHANGE UP (ref 0–5)
UROBILINOGEN FLD QL: 1 MG/DL — SIGNIFICANT CHANGE UP (ref 0.2–1)
WBC # BLD: 8 K/UL — SIGNIFICANT CHANGE UP (ref 3.8–10.5)
WBC # FLD AUTO: 8 K/UL — SIGNIFICANT CHANGE UP (ref 3.8–10.5)
WBC UR QL: 8 /HPF — HIGH (ref 0–5)

## 2025-06-13 PROCEDURE — 95819 EEG AWAKE AND ASLEEP: CPT | Mod: 26

## 2025-06-13 PROCEDURE — 71045 X-RAY EXAM CHEST 1 VIEW: CPT | Mod: 26

## 2025-06-13 PROCEDURE — 99232 SBSQ HOSP IP/OBS MODERATE 35: CPT

## 2025-06-13 PROCEDURE — 93306 TTE W/DOPPLER COMPLETE: CPT | Mod: 26

## 2025-06-13 RX ORDER — OXYCODONE HYDROCHLORIDE 30 MG/1
5 TABLET ORAL ONCE
Refills: 0 | Status: DISCONTINUED | OUTPATIENT
Start: 2025-06-13 | End: 2025-06-13

## 2025-06-13 RX ORDER — ENOXAPARIN SODIUM 100 MG/ML
40 INJECTION SUBCUTANEOUS EVERY 24 HOURS
Refills: 0 | Status: DISCONTINUED | OUTPATIENT
Start: 2025-06-13 | End: 2025-06-16

## 2025-06-13 RX ADMIN — Medication 10 MILLIGRAM(S): at 14:26

## 2025-06-13 RX ADMIN — Medication 650 MILLIGRAM(S): at 20:19

## 2025-06-13 RX ADMIN — Medication 40 MILLIGRAM(S): at 05:45

## 2025-06-13 RX ADMIN — SACUBITRIL AND VALSARTAN 1 TABLET(S): 49; 51 TABLET, FILM COATED ORAL at 17:00

## 2025-06-13 RX ADMIN — LABETALOL HYDROCHLORIDE 10 MILLIGRAM(S): 200 TABLET, FILM COATED ORAL at 11:53

## 2025-06-13 RX ADMIN — Medication 10 MILLIGRAM(S): at 20:16

## 2025-06-13 RX ADMIN — Medication 650 MILLIGRAM(S): at 01:38

## 2025-06-13 RX ADMIN — Medication 10 MILLIGRAM(S): at 09:09

## 2025-06-13 RX ADMIN — SACUBITRIL AND VALSARTAN 1 TABLET(S): 49; 51 TABLET, FILM COATED ORAL at 05:45

## 2025-06-13 RX ADMIN — OXYCODONE HYDROCHLORIDE 5 MILLIGRAM(S): 30 TABLET ORAL at 10:00

## 2025-06-13 RX ADMIN — Medication 500 MILLIGRAM(S): at 05:45

## 2025-06-13 RX ADMIN — METOPROLOL SUCCINATE 50 MILLIGRAM(S): 50 TABLET, EXTENDED RELEASE ORAL at 05:45

## 2025-06-13 RX ADMIN — OXYBUTYNIN CHLORIDE 15 MILLIGRAM(S): 5 TABLET, FILM COATED, EXTENDED RELEASE ORAL at 11:36

## 2025-06-13 RX ADMIN — Medication 2 TABLET(S): at 22:10

## 2025-06-13 RX ADMIN — ENOXAPARIN SODIUM 40 MILLIGRAM(S): 100 INJECTION SUBCUTANEOUS at 17:00

## 2025-06-13 RX ADMIN — Medication 3 MILLIGRAM(S): at 22:10

## 2025-06-13 RX ADMIN — Medication 650 MILLIGRAM(S): at 00:38

## 2025-06-13 RX ADMIN — Medication 1 APPLICATION(S): at 11:36

## 2025-06-13 RX ADMIN — OXYCODONE HYDROCHLORIDE 5 MILLIGRAM(S): 30 TABLET ORAL at 09:09

## 2025-06-13 RX ADMIN — ATORVASTATIN CALCIUM 40 MILLIGRAM(S): 80 TABLET, FILM COATED ORAL at 22:10

## 2025-06-13 NOTE — PHYSICAL THERAPY INITIAL EVALUATION ADULT - GENERAL OBSERVATIONS, REHAB EVAL
Pt received supine in bed + telemetry//BP, Pt's spouse Connie at bedside. Pt c/o 6/10 low back pain, pt agreeable to PT

## 2025-06-13 NOTE — PROGRESS NOTE ADULT - ASSESSMENT
INCOMPLETE_ VISIT PENDING     ASSESSMENT: 79yo M with PMHx GERD, HTN, BPH, CAD on baby aspirin, s/p CABG 2012, afib on xarelto s/p ablation, presented to [facility] [place]  with confusion and new onset vision loss. Per patient's wife, Pt returned from a doctor's appointment afternoon in his usual state of health, last known well 12pm 6/11/25. A few hours after around 4pm, Pt started experiencing dizziness, memory loss, forgetfulness, and vision loss. Pt was walking in the wrong rooms and placing items in incorrect places.   CT head with left parietal-occipital IPH, CT angiogram head/neck with multifocal intracranial atherosclerotic disease. MR brain demosntrated stable non enhancing moderate left occipital evangelist IPH with adjacent infarctions.     Impression: Left posterior-occipital IPH.  Post hemorrhagic acute adjacent infarcts. Etiology concerning for hypertension with underlying coagulopathy vs CAA. Follow up to resolution to exclude hemorrhagic transformation of ischemic infarction.     NEURO:   -Neurologically *******  -Continue close monitoring for neurologic deterioration  in the setting of cerebral edema with mass effect and brain compression.   - Stroke neuro checks q 2  hour  , VS q 2 hours   - Normotension as tolerated avoiding rapid fluctuations and hypotension, overall < 160/90   -ANTITHROMBOTIC THERAPY: on hold in setting of ICH, further timeline for initiation based on clinical course and serial imaging in coordination with nsx team   -home statin regimen if applicable   -MRI Brain w/ as noted , repeat in 4-6 weeks upon resolution for further evaluation.   -Dysphagia screen: passed, advance diet per protocol   -Physical therapy/OT/Speech eval/treatment.     -DVT ppx: Heparin s.c [] LMWH [] SCD[x]    -maintain adequate hydration    -Na Goal: 135-145   -monitor for si/sx of infection   - LDL/A1C as noted   -Stroke education     -CARDIOVASCULAR: -check TTE, cardiac monitoring w/ telemetry for now, further evaluation pending findings of noted workup  , rate control                                 -HEMATOLOGY: H/H without anemia , Platelets 177, patient should have all age and risk appropriate malignancy screenings with PCP or sooner if clinically suspected      DVT ppx: Heparin s.c [] LMWH [] SCD[x]     PULMONARY:  protecting airway, saturating well     RENAL: BUN/Cr within limits , monitor urine output, maintain adequate hydration       Na Goal:  135-145     Love: N    ID: afebrile, no leukocytosis, monitor for si/sx of infection     OTHER:  condition and plan of care d/w patient, questions and concerns addressed.     DISPOSITION: Rehab or home depending on PT eval once stable and workup is complete       CORE MEASURES     Admission NIHSS:     Tenecteplase : [] YES [x] NO     LDL/HDL/A1C: as noted      Depression Screen- if depression hx and/or present     Statin Therapy: as noted      Dysphagia Screen: [x] PASS [] FAIL     Smoking in the past 12 months [] YES [] NO pending      Afib [x] YES [] NO     Stroke Education [x] YES [] NO     Diabetes [] YES [x] NO     Diabetes [] YES [x] NO    Obtain screening lower extremity venous ultrasound in patients who meet 1 or more of the following criteria as patient is high risk for DVT/PE on admission:   [] History of DVT/PE  []Hypercoagulable states (Factor V Leiden, Cancer, OCP, etc. )  []Prolonged immobility (hemiplegia/hemiparesis/post operative or any other extended immobilization)  [] Transferred from outside facility (Rehab or Long term care)  [] Age </= to 50  [] Stroke      ASSESSMENT: 81yo M with PMHx GERD, HTN, BPH, CAD on baby aspirin, s/p CABG 2012, afib on xarelto s/p ablation, [place] [facility] [place] and new onset vision loss. Per patient's wife, Pt returned from a doctor's appointment afternoon in his usual state of health, last known well 12pm 6/11/25. A few hours after around 4pm, Pt started experiencing dizziness, memory loss, forgetfulness, and vision loss. Pt was walking in the wrong rooms and placing items in incorrect places.   CT head with left parietal-occipital IPH, CT angiogram head/neck with multifocal intracranial atherosclerotic disease. MR brain demosntrated stable non enhancing moderate left occipital evangelist IPH with adjacent infarctions.     Impression: Left posterior-occipital IPH.  Post hemorrhagic acute adjacent infarcts. Etiology concerning for hypertension with underlying coagulopathy vs CAA. Follow up to resolution to exclude hemorrhagic transformation of ischemic infarction.     NEURO:   -Neurologically without acute change   -Continue close monitoring for neurologic deterioration  in the setting of cerebral edema with mass effect and brain compression.   - Stroke neuro checks q 2  hour  , VS q 2 hours   - Normotension as tolerated avoiding rapid fluctuations and hypotension, overall < 160/90mmHg    -ANTITHROMBOTIC THERAPY: on hold in setting of ICH, further timeline for initiation based on clinical course and serial imaging in coordination with nsx team   -home statin regimen if applicable   -MRI Brain w/ as noted , repeat in 4-6 weeks upon resolution for further evaluation.   -Dysphagia screen: passed, advance diet per protocol   -Physical therapy/OT/Speech eval/treatment.     -DVT ppx: Heparin s.c [] LMWH [] SCD[x]    -maintain adequate hydration    -Na Goal: 135-145   -monitor for si/sx of infection   - LDL/A1C as noted   -Stroke education     -CARDIOVASCULAR: -check TTE, cardiac monitoring w/ telemetry for now, further evaluation pending findings of noted workup  , rate control                                 -HEMATOLOGY: H/H without anemia , Platelets 177, patient should have all age and risk appropriate malignancy screenings with PCP or sooner if clinically suspected   -LE duplex pending      DVT ppx: Heparin s.c [] LMWH [] SCD[x]     PULMONARY:  protecting airway, saturating well     RENAL: BUN/Cr within limits , monitor urine output, maintain adequate hydration       Na Goal:  135-145     Love: N    ID: afebrile, no leukocytosis, monitor for si/sx of infection     OTHER:  condition and plan of care d/w patient, questions and concerns addressed.     DISPOSITION: Rehab or home depending on PT eval once stable and workup is complete       CORE MEASURES     Admission NIHSS:     Tenecteplase : [] YES [x] NO     LDL/HDL/A1C: as noted      Depression Screen- if depression hx and/or present     Statin Therapy: as noted      Dysphagia Screen: [x] PASS [] FAIL     Smoking in the past 12 months [] YES [x] NO       Afib [x] YES [] NO     Stroke Education [x] YES [] NO     Diabetes [] YES [x] NO       Obtain screening lower extremity venous ultrasound in patients who meet 1 or more of the following criteria as patient is high risk for DVT/PE on admission:   [] History of DVT/PE  []Hypercoagulable states (Factor V Leiden, Cancer, OCP, etc. )  []Prolonged immobility (hemiplegia/hemiparesis/post operative or any other extended immobilization)  [] Transferred from outside facility (Rehab or Long term care)  [] Age </= to 50  [x] Stroke      ASSESSMENT: 79yo M with PMHx GERD, HTN, BPH, CAD on baby aspirin, s/p CABG 2012, afib on xarelto s/p ablation, [place] to [facility] [place]  with confusion and new onset vision loss. Per patient's wife, Pt returned from a doctor's appointment afternoon in his usual state of health, last known well 12pm 6/11/25. A few hours after around 4pm, Pt started experiencing dizziness, memory loss, forgetfulness, and vision loss. Pt was walking in the wrong rooms and placing items in incorrect places.   CT head with left parietal-occipital IPH, CT angiogram head/neck with multifocal intracranial atherosclerotic disease. MR brain demosntrated stable non enhancing moderate left occipital evangelist IPH with adjacent infarctions.     Impression: Left posterior-occipital IPH.  Post hemorrhagic acute adjacent infarcts. Etiology concerning for hypertension with underlying coagulopathy vs CAA. Follow up to resolution to exclude hemorrhagic transformation of ischemic infarction.     NEURO:   -Neurologically without acute change   -Continue close monitoring for neurologic deterioration  in the setting of cerebral edema with mass effect and brain compression.   - Stroke neuro checks q 2  hour  , VS q 2 hours   - Normotension as tolerated avoiding rapid fluctuations and hypotension, overall < 160/90mmHg    -ANTITHROMBOTIC THERAPY: on hold in setting of ICH, further timeline for initiation based on clinical course and serial imaging in coordination with nsx team   -home statin regimen if applicable   -MRI Brain w/ as noted , repeat in 4-6 weeks upon resolution for further evaluation. Unclear if true stroke vs related to underlying ich  -Dysphagia screen: passed, advance diet per protocol   -Physical therapy/OT/Speech eval/treatment.     -DVT ppx: Heparin s.c [] LMWH [] SCD[x]    -maintain adequate hydration    -Na Goal: 135-145   -monitor for si/sx of infection   - LDL/A1C as noted   -Stroke education     -CARDIOVASCULAR: -check TTE, cardiac monitoring w/ telemetry for now, further evaluation pending findings of noted workup  , rate control                                 -HEMATOLOGY: H/H without anemia , Platelets 177, patient should have all age and risk appropriate malignancy screenings with PCP or sooner if clinically suspected   -LE duplex pending      DVT ppx: Heparin s.c [] LMWH [] SCD[x]     PULMONARY:  protecting airway, saturating well     RENAL: BUN/Cr within limits , monitor urine output, maintain adequate hydration       Na Goal:  135-145     Love: N    ID: afebrile, no leukocytosis, monitor for si/sx of infection     OTHER:  condition and plan of care d/w patient, questions and concerns addressed.     DISPOSITION: Rehab or home depending on PT eval once stable and workup is complete       CORE MEASURES     Admission NIHSS:     Tenecteplase : [] YES [x] NO     LDL/HDL/A1C: as noted      Depression Screen- if depression hx and/or present     Statin Therapy: as noted      Dysphagia Screen: [x] PASS [] FAIL     Smoking in the past 12 months [] YES [x] NO       Afib [x] YES [] NO     Stroke Education [x] YES [] NO     Diabetes [] YES [x] NO       Obtain screening lower extremity venous ultrasound in patients who meet 1 or more of the following criteria as patient is high risk for DVT/PE on admission:   [] History of DVT/PE  []Hypercoagulable states (Factor V Leiden, Cancer, OCP, etc. )  []Prolonged immobility (hemiplegia/hemiparesis/post operative or any other extended immobilization)  [] Transferred from outside facility (Rehab or Long term care)  [] Age </= to 50  [x] Stroke

## 2025-06-13 NOTE — PROGRESS NOTE ADULT - CRITICAL CARE ATTENDING COMMENT
L parieto-occipital ICH, coagulopathy , HTN? no CAA evidence  stable  will start dvt ppx tonight  eeg neg for sz - no clinical concern for sz, will stop depakote  BP control - has been 150-160s today, restart home metoprolol  PT eval  f/u scan in 2 weeks to determine asa/ac timeline

## 2025-06-13 NOTE — EEG REPORT - NS EEG TEXT BOX
NAVJOT ALMENDAREZ [id number]     Study Date: 06-13-25  Duration: 22 min    --------------------------------------------------------------------------------------------------  History:  CC/ HPI Patient is a 80y old  Male who presents with a chief complaint of ICH (13 Jun 2025 09:44)    MEDICATIONS  (STANDING):  atorvastatin 40 milliGRAM(s) Oral at bedtime  chlorhexidine 2% Cloths 1 Application(s) Topical daily  dextrose 50% Injectable 25 Gram(s) IV Push once  divalproex  milliGRAM(s) Oral every 12 hours  enoxaparin Injectable 40 milliGRAM(s) SubCutaneous every 24 hours  insulin lispro (ADMELOG) corrective regimen sliding scale   SubCutaneous three times a day before meals  melatonin 3 milliGRAM(s) Oral at bedtime  metoprolol succinate ER 50 milliGRAM(s) Oral daily  oxybutynin XL 15 milliGRAM(s) Oral daily  pantoprazole    Tablet 40 milliGRAM(s) Oral before breakfast  polyethylene glycol 3350 17 Gram(s) Oral daily  sacubitril 24 mG/valsartan 26 mG 1 Tablet(s) Oral two times a day  senna 2 Tablet(s) Oral at bedtime    --------------------------------------------------------------------------------------------------  Study Interpretation:    [[[Abbreviation Key:  PDR=alpha rhythm/posterior dominant rhythm. A-P=anterior posterior gradient.  Amplitude: ‘very low’:<20; ‘low’:20-50; ‘medium’:; ‘high’:>200uV.  Persistence for periodic/rhythmic patterns (% of epoch) ‘rare’:<1%; ‘occasional’:1-10%; ‘frequent’:10-50%; ‘abundant’:50-90%; ‘continuous’:>90%.  Persistence for sporadic discharges: ‘rare’:<1/hr; ‘occasional’:1/min-1/hr; ‘frequent’:>1/min; ‘abundant’:>1/10 sec.  GRDA=generalized rhythmic delta activity; FIRDA=frontal intermittent GRDA; LRDA=lateralized rhythmic delta activity; TIRDA=temporal intermittent rhythmic delta activity;  LPD=PLED=lateralized periodic discharges; GPD=generalized periodic discharges; BiPDs=BiPLEDs=bilateral independent periodic epileptiform discharges; SIRPID=stimulus induced rhythmic, periodic, or ictal appearing discharges; BIRDs=brief potentially ictal rhythmic discharges >4 Hz, lasting .5-10s; PFA=paroxysmal bursts of beta/gamma; LVFA=low voltage fast activity.  Modifiers: +F=with fast component; +S=with spike component; +R=with rhythmic component.  S-B=burst suppression pattern.  Max=maximal. N1-drowsy; N2-stage II sleep; N3-slow wave sleep. SSS/BETS=small sharp spikes/benign epileptiform transients of sleep. HV=hyperventilation; PS=photic stimulation]]]    FINDINGS:      Background:  Continuity: Continuous  Symmetry: Symmetric  PDR: 11 Hz activity, less well formed in the left hemisphere with amplitude to 40 uV, that attenuated to eye opening.    Reactivity: Present  Voltage: Normal (between 20-150uV)  Anterior Posterior Gradient: Present  Other background findings: None  Breach: Absent    Background Slowing:  Generalized slowing: None was present.  Focal slowing: Continuous left hemispheric polymorphic delta and theta activity was present.    State Changes:   -Drowsiness noted with increased slowing, attenuation of fast activity, vertex transients.  -Present with N2 sleep transients with asymmetric spindles less well seen in the left hemisphere and K-complexes.    Interictal Findings:  Intermittent LRDA in the left hemisphere was seen at frequency of 1 - 2 Hz.  Sharply contoured left temporal activity which appears wicketiform.    Electrographic and Electroclinical seizures:  None    Other Clinical Events:  None    Activation Procedures:   -Hyperventilation was not performed.    -Photic stimulation was performed and did not elicit any abnormalities.       Artifacts:  Intermittent myogenic and movement artifacts were noted.    ECG:  The heart rate on single channel ECG was predominantly between 80 - 90 BPM, irregularly irregular.    EEG Classification / Summary:  Abnormal EEG study in the awake / drowsy / asleep states  -LRDA, left hemisphere, 1 - 2 Hz  -Sharply contoured left temporal wicketiform activity  -Focal slowing, left hemisphere, continuous    -----------------------------------------------------------------------------------------------------    Clinical Impression:  Abnormal EEG study  Risk of focal onset seizures from the left hemisphere.  Focal cerebral dysfunction in the left hemisphere, structural or functional in etiology.  Sharply contoured wicketiform activity can be seen as a normal variant and not clearly epileptiform.  There were no seizures recorded.      ECG was predominantly between 80 - 90 BPM, irregularly irregular.    This is a preliminary impression still pending attendings attestation.     -------------------------------------------------------------------------------------------------------  EEG reading room: [phone]    After-hours epilepsy service: [phone]    Arik Larry DO  Epilepsy Fellow   NAVJOT ALMENDAREZ [id number]     Study Date: 06-13-25  Duration: 22 min    --------------------------------------------------------------------------------------------------  History:  CC/ HPI Patient is a 80y old  Male who presents with a chief complaint of ICH (13 Jun 2025 09:44)    MEDICATIONS  (STANDING):  atorvastatin 40 milliGRAM(s) Oral at bedtime  chlorhexidine 2% Cloths 1 Application(s) Topical daily  dextrose 50% Injectable 25 Gram(s) IV Push once  divalproex  milliGRAM(s) Oral every 12 hours  enoxaparin Injectable 40 milliGRAM(s) SubCutaneous every 24 hours  insulin lispro (ADMELOG) corrective regimen sliding scale   SubCutaneous three times a day before meals  melatonin 3 milliGRAM(s) Oral at bedtime  metoprolol succinate ER 50 milliGRAM(s) Oral daily  oxybutynin XL 15 milliGRAM(s) Oral daily  pantoprazole    Tablet 40 milliGRAM(s) Oral before breakfast  polyethylene glycol 3350 17 Gram(s) Oral daily  sacubitril 24 mG/valsartan 26 mG 1 Tablet(s) Oral two times a day  senna 2 Tablet(s) Oral at bedtime    --------------------------------------------------------------------------------------------------  Study Interpretation:    [[[Abbreviation Key:  PDR=alpha rhythm/posterior dominant rhythm. A-P=anterior posterior gradient.  Amplitude: ‘very low’:<20; ‘low’:20-50; ‘medium’:; ‘high’:>200uV.  Persistence for periodic/rhythmic patterns (% of epoch) ‘rare’:<1%; ‘occasional’:1-10%; ‘frequent’:10-50%; ‘abundant’:50-90%; ‘continuous’:>90%.  Persistence for sporadic discharges: ‘rare’:<1/hr; ‘occasional’:1/min-1/hr; ‘frequent’:>1/min; ‘abundant’:>1/10 sec.  GRDA=generalized rhythmic delta activity; FIRDA=frontal intermittent GRDA; LRDA=lateralized rhythmic delta activity; TIRDA=temporal intermittent rhythmic delta activity;  LPD=PLED=lateralized periodic discharges; GPD=generalized periodic discharges; BiPDs=BiPLEDs=bilateral independent periodic epileptiform discharges; SIRPID=stimulus induced rhythmic, periodic, or ictal appearing discharges; BIRDs=brief potentially ictal rhythmic discharges >4 Hz, lasting .5-10s; PFA=paroxysmal bursts of beta/gamma; LVFA=low voltage fast activity.  Modifiers: +F=with fast component; +S=with spike component; +R=with rhythmic component.  S-B=burst suppression pattern.  Max=maximal. N1-drowsy; N2-stage II sleep; N3-slow wave sleep. SSS/BETS=small sharp spikes/benign epileptiform transients of sleep. HV=hyperventilation; PS=photic stimulation]]]    FINDINGS:      Background:  Continuity: Continuous  Symmetry: Asymmetric  PDR: 10 Hz, intermittent on the left, reactive ot eye closure  Voltage: Normal  Anterior Posterior Gradient: Present, low-amplitude frontal beta  Other background findings: None  Breach: Absent    Background Slowing:  Generalized slowing: None  Focal slowing: Continuous left hemispheric frontotemporally predominant focal polymorphic delta and theta slowing, intermittently semi-rhythmic at 1-2 Hz. Rare right frontotemporal focal polymorphic delta slowing.    State Changes:   Drowsiness is characterized by slowing of the background activity.  Stage 2 sleep is characterized by symmetric K complexes and by sleep spindles that are at times asymmetric (less well formed or more prominent on the left).     Interictal Findings:  None    Electrographic and Electroclinical seizures:  None    Other Clinical Events:  None    Activation Procedures:   -Hyperventilation was not performed.    -Photic stimulation was performed and did not elicit any abnormalities.       Artifacts:  Intermittent myogenic and movement artifacts    Single-lead EKG: Intermittently irregular rhythm. Occasional PVCs.    EEG Classification / Summary:  Abnormal EEG in the awake / drowsy / asleep states  -Continuous left hemispheric frontotemporally predominant focal slowing, intermittently semi-rhythmic  -Rare right frontotemporal focal slowing  -No definite epileptiform abnormalities  -No electrographic seizures    Clinical Impression:  -Left hemispheric frontotemporally predominant focal cerebral dysfunction, likely structural in etiology  -Right frontotemporal focal cerebral dysfunction can be structural or functional in etiology.   -No definite epileptiform abnormalities  -No seizures  -Single-lead EKG: Intermittently irregular rhythm.    -------------------------------------------------------------------------------------------------------  EEG reading room: [phone]  After-hours epilepsy service: [phone]    Arik Larry DO  Epilepsy Fellow    Jamila Coffey MD  Attending Physician, [facility]

## 2025-06-13 NOTE — PHYSICAL THERAPY INITIAL EVALUATION ADULT - SENSORY TESTS
(+) eye tracking bilaterally in all directions (+) nystagmus noted in right eye with right gaze; (+) acuity impaired in all fields (likely related to cognitive impairment more then vision impairement however, RN reports right sided hemianopsia)

## 2025-06-13 NOTE — OCCUPATIONAL THERAPY INITIAL EVALUATION ADULT - VISUAL ACUITY
+reading glasses, recently ordered distance glasses; no complaints offered. acuity impaired in all fields (likely related to cognitive impairment more then vision impairment however, RN reports R sided hemianopsia)

## 2025-06-13 NOTE — OCCUPATIONAL THERAPY INITIAL EVALUATION ADULT - COORDINATION ASSESSED, REHAB EVAL
no dysmetria noted RUE however, several verbal cues and demonstration needed, Pt does not initiate movement with LUE./finger to nose no dysmetria noted RUE however, several verbal cues and demonstration needed, Pt does not initiate movement with LUE/finger to nose

## 2025-06-13 NOTE — PROGRESS NOTE ADULT - ASSESSMENT
Assessment: 81yo M with PMHx GERD, HTN, BPH, CAD on baby aspirin, s/p CABG 2012, afib on xarelto s/p ablation, [place] [facility] [place] and new onset vision loss. CTH with findings of an acute left parieto-occipital ICH. Andexxa given for xarelto reversal, and DDAVP given for aspirin reversal. NIHSS 5. ICH score 1. Pt was transferred to [facility] for further management.     Plan:    -Discussed with Dr. Corado   -Neuro Checks Q2   -keppra 500mg bid for seizure prophylaxis  -24 hr stability CTH stable   -MRI brain w/wo: Stable nonenhancing moderate size left occipital lobe hematoma with small adjacent acute anterior infarcts  -No acute neurosurgical intervention, neurosurgery signing off, please reconsult as needed  -Follow up with Dr. Corado in the office in 2 weeks    -Further care as per Stroke neurology    Assessment: 79yo M with PMHx GERD, HTN, BPH, CAD on baby aspirin, s/p CABG 2012, afib on xarelto s/p ablation, [place] [facility] [place]. CTH with findings of an acute left parieto-occipital ICH. Andexxa given for xarelto reversal, and DDAVP given for aspirin reversal. NIHSS 5. ICH score 1. Pt was transferred to [facility] for further management.     Plan:    -Discussed with Dr. Corado   -Neuro Checks Q2   -keppra 500mg bid for seizure prophylaxis x 7 days   -24 hr stability CTH stable   -MRI brain w/wo: Stable nonenhancing moderate size left occipital lobe hematoma with small adjacent acute anterior infarcts  -No acute neurosurgical intervention, neurosurgery signing off, please reconsult as needed  -Follow up with Dr. Corado in the office in 2 weeks    -Further care as per Stroke neurology    Assessment: 79yo M with PMHx GERD, HTN, BPH, CAD on baby aspirin, s/p CABG 2012, afib on xarelto s/p ablation, presented to [facility] [place] with confusion and new onset vision loss. CTH with findings of an acute left parieto-occipital ICH. Andexxa given for xarelto reversal, and DDAVP given for aspirin reversal. NIHSS 5. ICH score 1. Pt was transferred to [facility] for further management.     Plan:    -Discussed with Dr. Corado   -Neuro Checks Q2   -keppra 500mg bid for seizure prophylaxis x 7 days   -24 hr stability CTH stable   -MRI brain w/wo: Stable nonenhancing moderate size left occipital lobe hematoma with small adjacent acute anterior infarcts  -Hold ASA/ Eliquis for 2 weeks with CTH prior to initiation   -No acute neurosurgical intervention, neurosurgery signing off, please reconsult as needed  -Follow up with Dr. Corado in the office in 2 weeks    -Further care as per Stroke neurology

## 2025-06-13 NOTE — PHYSICAL THERAPY INITIAL EVALUATION ADULT - PERTINENT HX OF CURRENT PROBLEM, REHAB EVAL
79yo M with PMHx GERD, HTN, BPH, CAD on baby aspirin, s/p CABG 2012, afib on xarelto s/p ablation, [place] [facility] [place] new onset vision loss, dizziness, memory loss, forgetfulness, and vision loss. CTH revealed an acute left parieto-occipital ICH. 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 Pt was transferred to [facility] for further management, no surgical interventions performed. Pt remains in NSICU

## 2025-06-13 NOTE — PHYSICAL THERAPY INITIAL EVALUATION ADULT - ADDITIONAL COMMENTS
Pt lives in a private home with his wife and 18 y/o daughter. 5 steps to enter with handrails, 12 steps inside with handrails. Pt was independent PTA without an assist device. Pt owns No DMe however, has a built in shower chair.

## 2025-06-13 NOTE — PROGRESS NOTE ADULT - SUBJECTIVE AND OBJECTIVE BOX
HPI:  81yo M with PMHx GERD, HTN, BPH, CAD on baby aspirin, s/p CABG 2012, afib on xarelto s/p ablation, presented to [facility] [place] today with confusion and new onset vision loss. Per patient's wife, Pt returned from a doctor's appointment this afternoon in his usual state of health, LKN 12pm. A few hours after around 4pm, Pt started experiencing dizziness, memory loss, forgetfulness, and vision loss. Pt was walking in the wrong rooms and placing items in incorrect places. CTH revealed an acute left parieto-occipital ICH. 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 Pt was given andexxa for xarelto reversal. SBP also noted to be in 200s, started on cardene drip. Pt denies headaches, N/V/D, CP, SOB, neck pain, recent injury to head/neck. NIHSS 5. ICH score 1. MRS 0. Pt was transferred to [facility] for further management.  (11 Jun 2025 23:28)      INTERVAL HPI/OVERNIGHT EVENTS:  80y Male seen by neurosurgery this am laying in bed, patient without any complaints.      Vital Signs Last 24 Hrs  T(C): 37 (13 Jun 2025 08:31), Max: 37.3 (12 Jun 2025 12:00)  T(F): 98.6 (13 Jun 2025 08:31), Max: 99.2 (12 Jun 2025 12:00)  HR: 87 (13 Jun 2025 09:00) (77 - 99)  BP: 167/127 (13 Jun 2025 09:00) (128/90 - 173/162)  BP(mean): 140 (13 Jun 2025 09:00) (98 - 168)  RR: 22 (13 Jun 2025 09:00) (15 - 26)  SpO2: 100% (13 Jun 2025 09:00) (95% - 100%)    Parameters below as of 13 Jun 2025 08:00  Patient On (Oxygen Delivery Method): room air    PHYSICAL EXAM:  GENERAL: NAD, well-groomed  HEAD:  Atraumatic, normocephalic  GLENDA COMA SCORE: E- V- M- = 15       E: 4= opens eyes spontaneously 3= to voice 2= to noxious 1= no opening       V: 5= oriented 4= confused 3= inappropriate words 2= incomprehensible sounds 1= nonverbal 1T= intubated       M: 6= follows commands 5= localizes 4= withdraws 3= flexor posturing 2= extensor posturing 1= no movement  MENTAL STATUS: AAO x2-3 Awake, Opens eyes spontaneously, Appropriately conversant without aphasia, following simple commands  CRANIAL NERVES: PERRL. EOMI without nystagmus. Face symmetric w/ normal eye closure and smile, tongue midline. Hearing grossly intact. Speech clear. Head turning and shoulder shrug intact.   MOTOR: RLE 4+/6 with minimal drift but does not hit bed, RUE, LUE/LLE 5/5   SENSATION: decreased sensation to right upper   CHEST/LUNG: non-labored breathing   EXTREMITIES:  2+ peripheral pulses  SKIN: Warm, dry    LABS:                        15.2   8.00  )-----------( 177      ( 13 Jun 2025 03:42 )             46.6     06-13    136  |  104  |  17.0  ----------------------------<  94  4.2   |  16.0[L]  |  1.04    Ca    8.8      13 Jun 2025 03:42  Phos  3.1     06-13  Mg     2.2     06-13    TPro  7.4  /  Alb  4.0  /  TBili  2.0  /  DBili  x   /  AST  21  /  ALT  10  /  AlkPhos  87  06-13    PT/INR - ( 12 Jun 2025 06:00 )   PT: 16.1 sec;   INR: 1.43 ratio    PTT - ( 12 Jun 2025 06:00 )  PTT:30.2 sec    Urinalysis Basic - ( 13 Jun 2025 03:42 )  Color: x / Appearance: x / SG: x / pH: x  Gluc: 94 mg/dL / Ketone: x  / Bili: x / Urobili: x   Blood: x / Protein: x / Nitrite: x   Leuk Esterase: x / RBC: x / WBC x   Sq Epi: x / Non Sq Epi: x / Bacteria: x    I&O:  06-12 @ 07:01  -  06-13 @ 07:00  --------------------------------------------------------  IN: 2960 mL / OUT: 1600 mL / NET: 1360 mL    06-13 @ 07:01  -  06-13 @ 09:54  --------------------------------------------------------  IN: 0 mL / OUT: 100 mL / NET: -100 mL    MEDICATIONS:   MEDICATIONS  (STANDING):  atorvastatin 40 milliGRAM(s) Oral at bedtime  chlorhexidine 2% Cloths 1 Application(s) Topical daily  dextrose 50% Injectable 25 Gram(s) IV Push once  divalproex  milliGRAM(s) Oral every 12 hours  enoxaparin Injectable 40 milliGRAM(s) SubCutaneous every 24 hours  insulin lispro (ADMELOG) corrective regimen sliding scale   SubCutaneous three times a day before meals  melatonin 3 milliGRAM(s) Oral at bedtime  metoprolol succinate ER 50 milliGRAM(s) Oral daily  oxybutynin XL 15 milliGRAM(s) Oral daily  pantoprazole    Tablet 40 milliGRAM(s) Oral before breakfast  polyethylene glycol 3350 17 Gram(s) Oral daily  sacubitril 24 mG/valsartan 26 mG 1 Tablet(s) Oral two times a day  senna 2 Tablet(s) Oral at bedtime    MEDICATIONS  (PRN):  acetaminophen     Tablet .. 650 milliGRAM(s) Oral every 6 hours PRN Temp greater or equal to 38C (100.4F), Mild Pain (1 - 3)  hydrALAZINE Injectable 10 milliGRAM(s) IV Push every 2 hours PRN SBP>160  labetalol Injectable 10 milliGRAM(s) IV Push every 2 hours PRN SBP>160    RADIOLOGY & ADDITIONAL TESTS:  < from: CT Head No Cont (06.12.25 @ 21:06) >  IMPRESSION:    Stable size of left parietal lobe hematoma and unchanged local mass   effect comparing to 6/11/2025.    --- End of Report ---  SHARLENE MARSH MD; Attending Radiologist  This document has been electronically signed. Jun 12 2025  9:33PM    < end of copied text >    < from: MR Head w/wo IV Cont (06.12.25 @ 13:05) >    IMPRESSION: Stable nonenhancing moderate size left occipital lobe   hematoma with small adjacent acute anterior infarcts.    --- End of Report ---  BERTO GOODMAN MD; Attending Radiologist  This document has been electronically signed. Jun 12 2025 11:16PM    < end of copied text >

## 2025-06-13 NOTE — PROGRESS NOTE ADULT - NS ATTEND AMEND GEN_ALL_CORE FT
NSGY Attg:    see above    patient seen and examined    agree with above    imaging reviewed    plan of care determined for ICH  no acute neurosurgical intervention  f/u in 2 weeks  plan/supportive care per primary team, stroke neurology  recall prn

## 2025-06-13 NOTE — PHYSICAL THERAPY INITIAL EVALUATION ADULT - IMPAIRMENTS FOUND, PT EVAL
cognitive impairment/decreased midline orientation/gait, locomotion, and balance/muscle strength/poor safety awareness/posture

## 2025-06-13 NOTE — OCCUPATIONAL THERAPY INITIAL EVALUATION ADULT - RANGE OF MOTION EXAMINATION, UPPER EXTREMITY
Pt with LUE inattention/bilateral UE Passive ROM was WNL (within normal limits)/bilateral UE Active ROM was WFL  (within functional limits)

## 2025-06-13 NOTE — PHYSICAL THERAPY INITIAL EVALUATION ADULT - DIAGNOSIS, PT EVAL
functional debility 2*2 impaired balance, decreased safety awareness, cognitive impairment, apraxia, aphasia, left sided inattention and vision deficits s/p neuro event.

## 2025-06-13 NOTE — PHYSICAL THERAPY INITIAL EVALUATION ADULT - WEIGHT-BEARING RESTRICTIONS: GAIT, REHAB EVAL
CPAP supplies ordered.   
Johanna needs to have supply order renewed as her mask is ripped and she is out of town.  LOV with Dr. Chou 12/2018.  She will schedule a follow up later this summer with him.  Route mask replacement RX to Dr. Chou.  
weight-bearing as tolerated

## 2025-06-13 NOTE — PHYSICAL THERAPY INITIAL EVALUATION ADULT - GAIT DEVIATIONS NOTED, PT EVAL
flexed hips/knees throughout, difficulty initiating movement, left sided inattention/decreased joao/increased time in double stance/decreased step length/decreased weight-shifting ability

## 2025-06-13 NOTE — OCCUPATIONAL THERAPY INITIAL EVALUATION ADULT - DIAGNOSIS, OT EVAL
80 year old Male presented to [facility] [place] with confusion, new onset vision loss, dizziness, memory loss, forgetfulness. CTH revealed an Acute L parieto-occipital ICH. 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 Pt was transferred to [facility] for further management, no surgical interventions performed. Pt remains in NSICU

## 2025-06-13 NOTE — OCCUPATIONAL THERAPY INITIAL EVALUATION ADULT - ADDITIONAL COMMENTS
Pt has a shower with doors and grab bars with a built in seat. Pt is R handed and drives. Pt owns no DME, independent PTA. Pt is wifes caretaker.

## 2025-06-13 NOTE — OCCUPATIONAL THERAPY INITIAL EVALUATION ADULT - GROSSLY INTACT, SENSORY
unable to formally assess 2*impaired cognition, appears intact to LT x 4 extremities unable to assess ability to localize

## 2025-06-13 NOTE — PROGRESS NOTE ADULT - SUBJECTIVE AND OBJECTIVE BOX
Preliminary note, official recommendations pending attending review/signature   Seen and examined by Stroke team attending/team, assessment/ plan as discussed with stroke team attending/team as noted.     [facility] Stroke Team  Progress Note     HPI:  81yo M with PMHx GERD, HTN, BPH, CAD on baby aspirin, s/p CABG 2012, afib on xarelto s/p ablation, presented to [facility] [place]  with confusion and new onset vision loss. Per patient's wife, Pt returned from a doctor's appointment afternoon in his usual state of health, last known well 12pm 6/11/25. A few hours after around 4pm, Pt started experiencing dizziness, memory loss, forgetfulness, and vision loss. Pt was walking in the wrong rooms and placing items in incorrect places.  Pt was transferred to [facility] for further management after being found to have ICH.     SUBJECTIVE: No events overnight.  No new neurologic complaints.  ROS reported negative unless otherwise noted.    acetaminophen     Tablet .. 650 milliGRAM(s) Oral every 6 hours PRN  atorvastatin 40 milliGRAM(s) Oral at bedtime  chlorhexidine 2% Cloths 1 Application(s) Topical daily  dextrose 50% Injectable 25 Gram(s) IV Push once  divalproex  milliGRAM(s) Oral every 12 hours  hydrALAZINE Injectable 10 milliGRAM(s) IV Push every 2 hours PRN  insulin lispro (ADMELOG) corrective regimen sliding scale   SubCutaneous three times a day before meals  labetalol Injectable 10 milliGRAM(s) IV Push every 2 hours PRN  melatonin 3 milliGRAM(s) Oral at bedtime  metoprolol succinate ER 50 milliGRAM(s) Oral daily  oxybutynin XL 15 milliGRAM(s) Oral daily  pantoprazole    Tablet 40 milliGRAM(s) Oral before breakfast  polyethylene glycol 3350 17 Gram(s) Oral daily  sacubitril 24 mG/valsartan 26 mG 1 Tablet(s) Oral two times a day  senna 2 Tablet(s) Oral at bedtime      PHYSICAL EXAM:   Vital Signs Last 24 Hrs  T(C): 36.9 (12 Jun 2025 17:02), Max: 37.3 (12 Jun 2025 07:41)  T(F): 98.5 (12 Jun 2025 17:02), Max: 99.2 (12 Jun 2025 12:00)  HR: 86 (13 Jun 2025 07:00) (77 - 99)  BP: 144/92 (13 Jun 2025 07:00) (128/90 - 173/162)  BP(mean): 107 (13 Jun 2025 07:00) (98 - 168)  RR: 20 (13 Jun 2025 07:00) (15 - 27)  SpO2: 98% (13 Jun 2025 07:00) (95% - 100%)    Parameters below as of 13 Jun 2025 04:00  Patient On (Oxygen Delivery Method): room air    EXAM PENDING     General: No acute distress.   HEENT: Head normocephalic, atraumatic. Conjunctivae clear w/o exudates or hemorrhage. Sclera non-icteric. Nares are patent bilaterally. Mucous membranes pink and moist.  No tonsillar swelling or exudates.    Neck: Supple, no adenopathy. Trachea midline. No JVD.  Cardiac: Normal rate and rhythm. S1, S2 auscultated. No murmurs, gallops, or rubs.     Respiratory: Lung sounds clear in all fields. Chest wall symmetric, nontender.   Abdominal: Soft, nondistended, nontender. Bowel sounds normoactive x 4 quadrants. No masses, hepatomegaly, or splenomegaly.   Skin: Skin is warm, dry and intact without rashes or lesions. Appropriate color for ethnicity. Nailbeds pink with no cyanosis or clubbing.   Extremities: No edema, 2+ peripheral pulses in b/l upper and b/l lower extremities. Full range of motion in all joints.     Mental status: sleeping, awakens readily, right neglect,  The patient is oriented to person, place, time. The patient is oriented to current events. The patient is able to name objects, follow commands, repeat sentences.    Cranial nerves: RHHA, Pupils equal and react symmetrically to light. There is no visual field deficit to confrontation. Extraocular motion is full with no nystagmus. There is no ptosis. Facial sensation is intact.      Motor: There is normal bulk and tone.  There is no tremor.  Strength is 4/5 in the right arm with drift that does not hit bed  and 4/5 leg- drifts to bed .   Strength is 5/5 in the left arm and leg.    Sensation: Intact to light touch and pin in 4 extremities    Cerebellar: There is no dysmetria on finger to nose testing.    Gait : deferred  LABS:                        15.2   8.00  )-----------( 177      ( 13 Jun 2025 03:42 )             46.6    06-13    136  |  104  |  17.0  ----------------------------<  94  4.2   |  16.0[L]  |  1.04    Ca    8.8      13 Jun 2025 03:42  Phos  3.1     06-13  Mg     2.2     06-13    TPro  7.4  /  Alb  4.0  /  TBili  2.0  /  DBili  x   /  AST  21  /  ALT  10  /  AlkPhos  87  06-13  PT/INR - ( 12 Jun 2025 06:00 )   PT: 16.1 sec;   INR: 1.43 ratio         PTT - ( 12 Jun 2025 06:00 )  PTT:30.2 sec        06-12 Chol 142 LDL -65 - HDL 64 Trig 66    A1C: 5.7        RADIOLOGY & ADDITIONAL STUDIES (independently reviewed unless otherwise noted):  Neuro-Imaging   MR Head w/wo IV Cont (06.12.25 @ 13:05)   IMPRESSION: Stable nonenhancing moderate size left occipital lobe   hematoma with small adjacent acute anterior infarcts.    CT Head No Cont (06.12.25 @ 21:06)   Stable size of left parietal lobe hematoma and unchanged local mass   effect comparing to 6/11/2025.    CT Head No Cont (06.11.25 @ 22:47)   Stable left posterior parietal-occipital paracentral intraparenchymal   hematoma    6/11/25  CT HEAD:  Left posterior parietal-occipital paracentral intraparenchymal hematoma measuring 3.4 x 3.8 x 3.5 cm with surrounding edema and mass effect on the splenium of the corpus callosum and atrium of the left lateral ventricle. Consider further evaluation via pre and postcontrast MR imaging of the brain, provided the patient has no contraindications.    CTA NECK:  1. No evidence of significant stenosis or occlusion.  2. Right lobe of the thyroid gland is prominent appearance measuring 8.4 x 4.8 x 5.1 cm in diameter. The thyroid isthmus is also prominent appearance measuring up to 3 cm in diameter. Several nodules are appreciated within the bilateral lobes of the thyroid gland. Findings include a 1.7 x 1.6 cm nodule with peripheral calcification within the region of the thyroid isthmus. Consider follow-up dedicated thyroid ultrasound assessment on a nonemergent basis for further characterization, as clinically indicated and provided the patient has no contraindications.    CTA HEAD:  1. No large vessel occlusion.  2. Mild stenosis in association with the distal intracranial bilateral vertebral arteries..  3. Hypoplastic left P1 segment with a fetal origin of the left posterior cerebral artery.  4. Left A1 segment is hypoplastic. Bilateral A2 segments appear to fill via the right A1 segment.  5.No aneurysm identified. Tiny aneurysms can be beyond the resolution of CTA technique. No discrete AVM appreciated.       Preliminary note, official recommendations pending attending review/signature   Seen and examined by Stroke team attending, noted below as discussed with stroke team attending/team as noted.     [facility] Stroke Team  Progress Note     HPI:  79yo M with PMHx GERD, HTN, BPH, CAD on baby aspirin, s/p CABG 2012, afib on xarelto s/p ablation, [place] [facility] [place] and new onset vision loss. Per patient's wife, Pt returned from a doctor's appointment afternoon in his usual state of health, last known well 12pm 6/11/25. A few hours after around 4pm, Pt started experiencing dizziness, memory loss, forgetfulness, and vision loss. Pt was walking in the wrong rooms and placing items in incorrect places.  Pt was transferred to [facility] for further management after being found to have ICH.     SUBJECTIVE: No events overnight.  No new neurologic complaints.  ROS reported negative unless otherwise noted.    acetaminophen     Tablet .. 650 milliGRAM(s) Oral every 6 hours PRN  atorvastatin 40 milliGRAM(s) Oral at bedtime  chlorhexidine 2% Cloths 1 Application(s) Topical daily  dextrose 50% Injectable 25 Gram(s) IV Push once  divalproex  milliGRAM(s) Oral every 12 hours  hydrALAZINE Injectable 10 milliGRAM(s) IV Push every 2 hours PRN  insulin lispro (ADMELOG) corrective regimen sliding scale   SubCutaneous three times a day before meals  labetalol Injectable 10 milliGRAM(s) IV Push every 2 hours PRN  melatonin 3 milliGRAM(s) Oral at bedtime  metoprolol succinate ER 50 milliGRAM(s) Oral daily  oxybutynin XL 15 milliGRAM(s) Oral daily  pantoprazole    Tablet 40 milliGRAM(s) Oral before breakfast  polyethylene glycol 3350 17 Gram(s) Oral daily  sacubitril 24 mG/valsartan 26 mG 1 Tablet(s) Oral two times a day  senna 2 Tablet(s) Oral at bedtime      PHYSICAL EXAM:   Vital Signs Last 24 Hrs  T(C): 36.9 (12 Jun 2025 17:02), Max: 37.3 (12 Jun 2025 07:41)  T(F): 98.5 (12 Jun 2025 17:02), Max: 99.2 (12 Jun 2025 12:00)  HR: 86 (13 Jun 2025 07:00) (77 - 99)  BP: 144/92 (13 Jun 2025 07:00) (128/90 - 173/162)  BP(mean): 107 (13 Jun 2025 07:00) (98 - 168)  RR: 20 (13 Jun 2025 07:00) (15 - 27)  SpO2: 98% (13 Jun 2025 07:00) (95% - 100%)    Parameters below as of 13 Jun 2025 04:00  Patient On (Oxygen Delivery Method): room air         General: No acute distress.   HEENT: Head normocephalic, atraumatic. Conjunctivae clear w/o exudates or hemorrhage.       Respiratory: Lung sounds clear in all fields. Chest wall symmetric, nontender.   Abdominal: Soft, nondistended, nontender. Bowel sounds normoactive x 4 quadrants.    Skin: Skin is warm, dry    Extremities: No edema     Mental status: sleeping, awakens readily, right neglect,  The patient is oriented to person, place, time. The patient is oriented to current events. The patient is able to name objects, follow commands, repeat sentences.    Cranial nerves: RHHA, Pupils equal and react symmetrically to light. There is no visual field deficit to confrontation. Extraocular motion is full with no nystagmus. There is no ptosis. Facial sensation is intact.      Motor: There is normal bulk and tone.  There is no tremor.  Strength is 4/5 in the right arm with drift that does not hit bed  and 4/5 leg- drifts to bed.   Strength is 5/5 in the left arm and leg.    Sensation: Intact to light touch and pin in 4 extremities    Cerebellar: There is no dysmetria on finger to nose testing.    Gait : deferred  LABS:                        15.2   8.00  )-----------( 177      ( 13 Jun 2025 03:42 )             46.6    06-13    136  |  104  |  17.0  ----------------------------<  94  4.2   |  16.0[L]  |  1.04    Ca    8.8      13 Jun 2025 03:42  Phos  3.1     06-13  Mg     2.2     06-13    TPro  7.4  /  Alb  4.0  /  TBili  2.0  /  DBili  x   /  AST  21  /  ALT  10  /  AlkPhos  87  06-13  PT/INR - ( 12 Jun 2025 06:00 )   PT: 16.1 sec;   INR: 1.43 ratio         PTT - ( 12 Jun 2025 06:00 )  PTT:30.2 sec        06-12 Chol 142 LDL -65 - HDL 64 Trig 66    A1C: 5.7        RADIOLOGY & ADDITIONAL STUDIES (independently reviewed unless otherwise noted):  Neuro-Imaging     CT Head No Cont (06.12.25 @ 21:06)   Stable size of left parietal lobe hematoma and unchanged local mass   effect comparing to 6/11/2025.    MR Head w/wo IV Cont (06.12.25 @ 13:05)   IMPRESSION: Stable nonenhancing moderate size left occipital lobe   hematoma with small adjacent acute anterior infarcts.    CT Head No Cont (06.12.25 @ 21:06)   Stable size of left parietal lobe hematoma and unchanged local mass   effect comparing to 6/11/2025.    CT Head No Cont (06.11.25 @ 22:47)   Stable left posterior parietal-occipital paracentral intraparenchymal   hematoma    6/11/25  CT HEAD:  Left posterior parietal-occipital paracentral intraparenchymal hematoma measuring 3.4 x 3.8 x 3.5 cm with surrounding edema and mass effect on the splenium of the corpus callosum and atrium of the left lateral ventricle. Consider further evaluation via pre and postcontrast MR imaging of the brain, provided the patient has no contraindications.    CTA NECK:  1. No evidence of significant stenosis or occlusion.  2. Right lobe of the thyroid gland is prominent appearance measuring 8.4 x 4.8 x 5.1 cm in diameter. The thyroid isthmus is also prominent appearance measuring up to 3 cm in diameter. Several nodules are appreciated within the bilateral lobes of the thyroid gland. Findings include a 1.7 x 1.6 cm nodule with peripheral calcification within the region of the thyroid isthmus. Consider follow-up dedicated thyroid ultrasound assessment on a nonemergent basis for further characterization, as clinically indicated and provided the patient has no contraindications.    CTA HEAD:  1. No large vessel occlusion.  2. Mild stenosis in association with the distal intracranial bilateral vertebral arteries..  3. Hypoplastic left P1 segment with a fetal origin of the left posterior cerebral artery.  4. Left A1 segment is hypoplastic. Bilateral A2 segments appear to fill via the right A1 segment.  5.No aneurysm identified. Tiny aneurysms can be beyond the resolution of CTA technique. No discrete AVM appreciated.         [facility] Stroke Team  Progress Note     HPI:  79yo M with PMHx GERD, HTN, BPH, CAD on baby aspirin, s/p CABG 2012, afib on xarelto s/p ablation, presented to [facility] [place]  with confusion and new onset vision loss. Per patient's wife, Pt returned from a doctor's appointment afternoon in his usual state of health, last known well 12pm 6/11/25. A few hours after around 4pm, Pt started experiencing dizziness, memory loss, forgetfulness, and vision loss. Pt was walking in the wrong rooms and placing items in incorrect places.  Pt was transferred to [facility] for further management after being found to have ICH.     SUBJECTIVE: No events overnight.  No new neurologic complaints.  ROS reported negative unless otherwise noted.    acetaminophen     Tablet .. 650 milliGRAM(s) Oral every 6 hours PRN  atorvastatin 40 milliGRAM(s) Oral at bedtime  chlorhexidine 2% Cloths 1 Application(s) Topical daily  dextrose 50% Injectable 25 Gram(s) IV Push once  divalproex  milliGRAM(s) Oral every 12 hours  hydrALAZINE Injectable 10 milliGRAM(s) IV Push every 2 hours PRN  insulin lispro (ADMELOG) corrective regimen sliding scale   SubCutaneous three times a day before meals  labetalol Injectable 10 milliGRAM(s) IV Push every 2 hours PRN  melatonin 3 milliGRAM(s) Oral at bedtime  metoprolol succinate ER 50 milliGRAM(s) Oral daily  oxybutynin XL 15 milliGRAM(s) Oral daily  pantoprazole    Tablet 40 milliGRAM(s) Oral before breakfast  polyethylene glycol 3350 17 Gram(s) Oral daily  sacubitril 24 mG/valsartan 26 mG 1 Tablet(s) Oral two times a day  senna 2 Tablet(s) Oral at bedtime      PHYSICAL EXAM:   Vital Signs Last 24 Hrs  T(C): 36.9 (12 Jun 2025 17:02), Max: 37.3 (12 Jun 2025 07:41)  T(F): 98.5 (12 Jun 2025 17:02), Max: 99.2 (12 Jun 2025 12:00)  HR: 86 (13 Jun 2025 07:00) (77 - 99)  BP: 144/92 (13 Jun 2025 07:00) (128/90 - 173/162)  BP(mean): 107 (13 Jun 2025 07:00) (98 - 168)  RR: 20 (13 Jun 2025 07:00) (15 - 27)  SpO2: 98% (13 Jun 2025 07:00) (95% - 100%)    Parameters below as of 13 Jun 2025 04:00  Patient On (Oxygen Delivery Method): room air         General: No acute distress.   HEENT: Head normocephalic, atraumatic. Conjunctivae clear w/o exudates or hemorrhage.       Respiratory: Lung sounds clear in all fields. Chest wall symmetric, nontender.   Abdominal: Soft, nondistended, nontender. Bowel sounds normoactive x 4 quadrants.    Skin: Skin is warm, dry    Extremities: No edema     Mental status: sleeping, awakens readily, right neglect,  The patient is oriented to person, place, time. The patient is oriented to current events. The patient is able to name objects, follow commands, repeat sentences.    Cranial nerves: RHHA, Pupils equal and react symmetrically to light. There is no visual field deficit to confrontation. Extraocular motion is full with no nystagmus. There is no ptosis. Facial sensation is intact.      Motor: There is normal bulk and tone.  There is no tremor.  Strength is 4/5 in the right arm with drift that does not hit bed  and 4/5 leg- drifts to bed.   Strength is 5/5 in the left arm and leg.    Sensation: Intact to light touch and pin in 4 extremities    Cerebellar: There is no dysmetria on finger to nose testing.    Gait : deferred  LABS:                        15.2   8.00  )-----------( 177      ( 13 Jun 2025 03:42 )             46.6    06-13    136  |  104  |  17.0  ----------------------------<  94  4.2   |  16.0[L]  |  1.04    Ca    8.8      13 Jun 2025 03:42  Phos  3.1     06-13  Mg     2.2     06-13    TPro  7.4  /  Alb  4.0  /  TBili  2.0  /  DBili  x   /  AST  21  /  ALT  10  /  AlkPhos  87  06-13  PT/INR - ( 12 Jun 2025 06:00 )   PT: 16.1 sec;   INR: 1.43 ratio         PTT - ( 12 Jun 2025 06:00 )  PTT:30.2 sec        06-12 Chol 142 LDL -65 - HDL 64 Trig 66    A1C: 5.7        RADIOLOGY & ADDITIONAL STUDIES (independently reviewed unless otherwise noted):  Neuro-Imaging     CT Head No Cont (06.12.25 @ 21:06)   Stable size of left parietal lobe hematoma and unchanged local mass   effect comparing to 6/11/2025.    MR Head w/wo IV Cont (06.12.25 @ 13:05)   IMPRESSION: Stable nonenhancing moderate size left occipital lobe   hematoma with small adjacent acute anterior infarcts.    CT Head No Cont (06.12.25 @ 21:06)   Stable size of left parietal lobe hematoma and unchanged local mass   effect comparing to 6/11/2025.    CT Head No Cont (06.11.25 @ 22:47)   Stable left posterior parietal-occipital paracentral intraparenchymal   hematoma    6/11/25  CT HEAD:  Left posterior parietal-occipital paracentral intraparenchymal hematoma measuring 3.4 x 3.8 x 3.5 cm with surrounding edema and mass effect on the splenium of the corpus callosum and atrium of the left lateral ventricle. Consider further evaluation via pre and postcontrast MR imaging of the brain, provided the patient has no contraindications.    CTA NECK:  1. No evidence of significant stenosis or occlusion.  2. Right lobe of the thyroid gland is prominent appearance measuring 8.4 x 4.8 x 5.1 cm in diameter. The thyroid isthmus is also prominent appearance measuring up to 3 cm in diameter. Several nodules are appreciated within the bilateral lobes of the thyroid gland. Findings include a 1.7 x 1.6 cm nodule with peripheral calcification within the region of the thyroid isthmus. Consider follow-up dedicated thyroid ultrasound assessment on a nonemergent basis for further characterization, as clinically indicated and provided the patient has no contraindications.    CTA HEAD:  1. No large vessel occlusion.  2. Mild stenosis in association with the distal intracranial bilateral vertebral arteries..  3. Hypoplastic left P1 segment with a fetal origin of the left posterior cerebral artery.  4. Left A1 segment is hypoplastic. Bilateral A2 segments appear to fill via the right A1 segment.  5.No aneurysm identified. Tiny aneurysms can be beyond the resolution of CTA technique. No discrete AVM appreciated.

## 2025-06-13 NOTE — OCCUPATIONAL THERAPY INITIAL EVALUATION ADULT - LIVES WITH, PROFILE
Pt lives in a house with his wife and 17 year old daughter. Pt wife minimally able to assist, reports have friends/family. 5 DAGMAR with handrail. Flight of stairs with handrail to bed/bath./children/spouse

## 2025-06-13 NOTE — PHYSICAL THERAPY INITIAL EVALUATION ADULT - COORDINATION ASSESSED, REHAB EVAL
no dysmetria noted RUE however, several verbal cues and demonstration needed, Pt does not initiate movement with LUE/finger to nose

## 2025-06-13 NOTE — OCCUPATIONAL THERAPY INITIAL EVALUATION ADULT - GENERAL OBSERVATIONS, REHAB EVAL
Patient returning a call states he listened to voicemail from Justina muro and called and scheduled himself for ACTH at the [facility] site- for 2/18.States he was advised test would need to be done at 0800, so he will be arrive at 0730.  He stated he would like to hold that appointment at  on 2/26 until he hears from EVANGELINA Horn to verify that he is able to cancel it, just in case he has to go to that specific site. Call out to office, was unable to establish contact at this time. Please advise, Patient is requesting a call back from EVANGELINA Horn to discuss. Thank you    Pt is deaf in L ear per pt spouse Conniethu wolfe for evaluation. Pt received supine in bed, NAD, +IV lock, +Tele//BP, +b/l VCB, +condom cath on RA with wife Kizzy bedside. Pre/post c/o 6/10 pain. Pt agreeable to OT evaluation

## 2025-06-14 LAB
ALBUMIN SERPL ELPH-MCNC: 3.8 G/DL — SIGNIFICANT CHANGE UP (ref 3.3–5.2)
ALP SERPL-CCNC: 80 U/L — SIGNIFICANT CHANGE UP (ref 40–120)
ALT FLD-CCNC: 10 U/L — SIGNIFICANT CHANGE UP
ANION GAP SERPL CALC-SCNC: 17 MMOL/L — SIGNIFICANT CHANGE UP (ref 5–17)
AST SERPL-CCNC: 19 U/L — SIGNIFICANT CHANGE UP
BILIRUB SERPL-MCNC: 1.9 MG/DL — SIGNIFICANT CHANGE UP (ref 0.4–2)
BUN SERPL-MCNC: 20.6 MG/DL — HIGH (ref 8–20)
CALCIUM SERPL-MCNC: 8.7 MG/DL — SIGNIFICANT CHANGE UP (ref 8.4–10.5)
CHLORIDE SERPL-SCNC: 100 MMOL/L — SIGNIFICANT CHANGE UP (ref 96–108)
CO2 SERPL-SCNC: 17 MMOL/L — LOW (ref 22–29)
CREAT SERPL-MCNC: 1.23 MG/DL — SIGNIFICANT CHANGE UP (ref 0.5–1.3)
EGFR: 60 ML/MIN/1.73M2 — SIGNIFICANT CHANGE UP
EGFR: 60 ML/MIN/1.73M2 — SIGNIFICANT CHANGE UP
GLUCOSE BLDC GLUCOMTR-MCNC: 108 MG/DL — HIGH (ref 70–99)
GLUCOSE BLDC GLUCOMTR-MCNC: 86 MG/DL — SIGNIFICANT CHANGE UP (ref 70–99)
GLUCOSE BLDC GLUCOMTR-MCNC: 92 MG/DL — SIGNIFICANT CHANGE UP (ref 70–99)
GLUCOSE BLDC GLUCOMTR-MCNC: 97 MG/DL — SIGNIFICANT CHANGE UP (ref 70–99)
GLUCOSE SERPL-MCNC: 106 MG/DL — HIGH (ref 70–99)
HCT VFR BLD CALC: 46.8 % — SIGNIFICANT CHANGE UP (ref 39–50)
HGB BLD-MCNC: 14.9 G/DL — SIGNIFICANT CHANGE UP (ref 13–17)
MAGNESIUM SERPL-MCNC: 2.2 MG/DL — SIGNIFICANT CHANGE UP (ref 1.6–2.6)
MCHC RBC-ENTMCNC: 25 PG — LOW (ref 27–34)
MCHC RBC-ENTMCNC: 31.8 G/DL — LOW (ref 32–36)
MCV RBC AUTO: 78.4 FL — LOW (ref 80–100)
NRBC # BLD AUTO: 0 K/UL — SIGNIFICANT CHANGE UP (ref 0–0)
NRBC # FLD: 0 K/UL — SIGNIFICANT CHANGE UP (ref 0–0)
NRBC BLD AUTO-RTO: 0 /100 WBCS — SIGNIFICANT CHANGE UP (ref 0–0)
PHOSPHATE SERPL-MCNC: 4.1 MG/DL — SIGNIFICANT CHANGE UP (ref 2.4–4.7)
PLATELET # BLD AUTO: 210 K/UL — SIGNIFICANT CHANGE UP (ref 150–400)
PMV BLD: 11 FL — SIGNIFICANT CHANGE UP (ref 7–13)
POTASSIUM SERPL-MCNC: 4.2 MMOL/L — SIGNIFICANT CHANGE UP (ref 3.5–5.3)
POTASSIUM SERPL-SCNC: 4.2 MMOL/L — SIGNIFICANT CHANGE UP (ref 3.5–5.3)
PROT SERPL-MCNC: 7.2 G/DL — SIGNIFICANT CHANGE UP (ref 6.6–8.7)
RBC # BLD: 5.97 M/UL — HIGH (ref 4.2–5.8)
RBC # FLD: 16.2 % — HIGH (ref 10.3–14.5)
SODIUM SERPL-SCNC: 134 MMOL/L — LOW (ref 135–145)
WBC # BLD: 8.96 K/UL — SIGNIFICANT CHANGE UP (ref 3.8–10.5)
WBC # FLD AUTO: 8.96 K/UL — SIGNIFICANT CHANGE UP (ref 3.8–10.5)

## 2025-06-14 PROCEDURE — 99233 SBSQ HOSP IP/OBS HIGH 50: CPT | Mod: FS

## 2025-06-14 PROCEDURE — 93970 EXTREMITY STUDY: CPT | Mod: 26

## 2025-06-14 RX ORDER — VANCOMYCIN HCL IN 5 % DEXTROSE 1.5G/250ML
1500 PLASTIC BAG, INJECTION (ML) INTRAVENOUS EVERY 12 HOURS
Refills: 0 | Status: DISCONTINUED | OUTPATIENT
Start: 2025-06-14 | End: 2025-06-15

## 2025-06-14 RX ORDER — VANCOMYCIN HCL IN 5 % DEXTROSE 1.5G/250ML
1500 PLASTIC BAG, INJECTION (ML) INTRAVENOUS ONCE
Refills: 0 | Status: COMPLETED | OUTPATIENT
Start: 2025-06-14 | End: 2025-06-14

## 2025-06-14 RX ORDER — VANCOMYCIN HCL IN 5 % DEXTROSE 1.5G/250ML
PLASTIC BAG, INJECTION (ML) INTRAVENOUS
Refills: 0 | Status: DISCONTINUED | OUTPATIENT
Start: 2025-06-14 | End: 2025-06-15

## 2025-06-14 RX ADMIN — Medication 75 MILLILITER(S): at 17:09

## 2025-06-14 RX ADMIN — Medication 650 MILLIGRAM(S): at 07:22

## 2025-06-14 RX ADMIN — SACUBITRIL AND VALSARTAN 1 TABLET(S): 49; 51 TABLET, FILM COATED ORAL at 17:09

## 2025-06-14 RX ADMIN — OXYBUTYNIN CHLORIDE 15 MILLIGRAM(S): 5 TABLET, FILM COATED, EXTENDED RELEASE ORAL at 17:09

## 2025-06-14 RX ADMIN — Medication 40 MILLIGRAM(S): at 06:22

## 2025-06-14 RX ADMIN — Medication 75 MILLILITER(S): at 02:08

## 2025-06-14 RX ADMIN — METOPROLOL SUCCINATE 50 MILLIGRAM(S): 50 TABLET, EXTENDED RELEASE ORAL at 06:22

## 2025-06-14 RX ADMIN — Medication 300 MILLIGRAM(S): at 02:54

## 2025-06-14 RX ADMIN — Medication 650 MILLIGRAM(S): at 23:40

## 2025-06-14 RX ADMIN — ENOXAPARIN SODIUM 40 MILLIGRAM(S): 100 INJECTION SUBCUTANEOUS at 17:12

## 2025-06-14 RX ADMIN — Medication 300 MILLIGRAM(S): at 17:12

## 2025-06-14 RX ADMIN — Medication 3 MILLIGRAM(S): at 22:24

## 2025-06-14 RX ADMIN — ATORVASTATIN CALCIUM 40 MILLIGRAM(S): 80 TABLET, FILM COATED ORAL at 22:24

## 2025-06-14 RX ADMIN — Medication 650 MILLIGRAM(S): at 06:22

## 2025-06-14 RX ADMIN — SACUBITRIL AND VALSARTAN 1 TABLET(S): 49; 51 TABLET, FILM COATED ORAL at 06:22

## 2025-06-14 RX ADMIN — Medication 1 APPLICATION(S): at 17:12

## 2025-06-14 RX ADMIN — Medication 75 MILLILITER(S): at 06:21

## 2025-06-14 NOTE — PROGRESS NOTE ADULT - ASSESSMENT
***INCOMPLETE***  ASSESSMENT: 81yo M with PMHx GERD, HTN, BPH, CAD on baby aspirin, s/p CABG 2012, afib on xarelto s/p ablation, [place] [facility] [place] and new onset vision loss. Per patient's wife, Pt returned from a doctor's appointment afternoon in his usual state of health, last known well 12pm 6/11/25. A few hours after around 4pm, Pt started experiencing dizziness, memory loss, forgetfulness, and vision loss. Pt was walking in the wrong rooms and placing items in incorrect places.   CT head with left parietal-occipital IPH, CT angiogram head/neck with multifocal intracranial atherosclerotic disease. MR brain demosntrated stable non enhancing moderate left occipital evangelist IPH with adjacent infarctions.     Impression: Left posterior-occipital IPH.  Post hemorrhagic acute adjacent infarcts. Etiology concerning for hypertension with underlying coagulopathy vs CAA. Follow up to resolution to exclude hemorrhagic transformation of ischemic infarction.     NEURO:   -Neurologically without acute change   -Continue close monitoring for neurologic deterioration  in the setting of cerebral edema with mass effect and brain compression.   - Stroke neuro checks q 2  hour  , VS q 2 hours   - Normotension as tolerated avoiding rapid fluctuations and hypotension, overall < 160/90mmHg    -ANTITHROMBOTIC THERAPY: on hold in setting of ICH, further timeline for initiation based on clinical course and serial imaging in coordination with nsx team   -home statin regimen if applicable   -MRI Brain w/ as noted , repeat in 4-6 weeks upon resolution for further evaluation. Unclear if true stroke vs related to underlying ich  -Dysphagia screen: passed, advance diet per protocol   -Physical therapy/OT/Speech eval/treatment.     -DVT ppx: Heparin s.c [] LMWH [] SCD[x]    -maintain adequate hydration    -Na Goal: 135-145   -monitor for si/sx of infection   - LDL/A1C as noted   -Stroke education     -CARDIOVASCULAR: -check TTE, cardiac monitoring w/ telemetry for now, further evaluation pending findings of noted workup  , rate control                                 -HEMATOLOGY: H/H without anemia , Platelets 177, patient should have all age and risk appropriate malignancy screenings with PCP or sooner if clinically suspected   -LE duplex pending      DVT ppx: Heparin s.c [] LMWH [] SCD[x]     PULMONARY:  protecting airway, saturating well     RENAL: BUN/Cr within limits , monitor urine output, maintain adequate hydration       Na Goal:  135-145     Love: N    ID: afebrile, no leukocytosis, monitor for si/sx of infection     OTHER:  condition and plan of care d/w patient, questions and concerns addressed.     DISPOSITION: Rehab or home depending on PT eval once stable and workup is complete       CORE MEASURES     Admission NIHSS:     Tenecteplase : [] YES [x] NO     LDL/HDL/A1C: as noted      Depression Screen- if depression hx and/or present     Statin Therapy: as noted      Dysphagia Screen: [x] PASS [] FAIL     Smoking in the past 12 months [] YES [x] NO       Afib [x] YES [] NO     Stroke Education [x] YES [] NO     Diabetes [] YES [x] NO       Obtain screening lower extremity venous ultrasound in patients who meet 1 or more of the following criteria as patient is high risk for DVT/PE on admission:   [] History of DVT/PE  []Hypercoagulable states (Factor V Leiden, Cancer, OCP, etc. )  []Prolonged immobility (hemiplegia/hemiparesis/post operative or any other extended immobilization)  [] Transferred from outside facility (Rehab or Long term care)  [] Age </= to 50  [x] Stroke    ASSESSMENT: 81yo M with PMHx GERD, HTN, BPH, CAD on baby aspirin, s/p CABG 2012, afib on xarelto s/p ablation, [place] to [facility] [place]  with confusion and new onset vision loss. Per patient's wife, Pt returned from a doctor's appointment afternoon in his usual state of health, last known well 12pm 6/11/25. A few hours after around 4pm, Pt started experiencing dizziness, memory loss, forgetfulness, and vision loss. Pt was walking in the wrong rooms and placing items in incorrect places.   CT head with left parietal-occipital IPH, CT angiogram head/neck with multifocal intracranial atherosclerotic disease. MR brain demonstrated stable non enhancing moderate left occipital evangelist IPH with adjacent infarctions.     Impression: Left posterior-occipital IPH.  Post hemorrhagic acute adjacent infarcts. Etiology concerning for hypertension with underlying coagulopathy vs CAA. Follow up to resolution to exclude hemorrhagic transformation of ischemic infarction.     NEURO:   -Neurologically without acute change   -Continue close monitoring for neurologic deterioration  in the setting of cerebral edema with mass effect and brain compression.   - Stroke neuro checks q 2  hour  , VS q 2 hours   - Normotension as tolerated avoiding rapid fluctuations and hypotension, overall < 160/90mmHg    -ANTITHROMBOTIC THERAPY: on hold in setting of ICH, further timeline for initiation based on clinical course and serial imaging in coordination with nsx team   -home statin regimen if applicable   -MRI Brain w/ as noted , repeat in 4-6 weeks upon resolution for further evaluation. Unclear if true stroke vs related to underlying ich  -Dysphagia screen: passed, advance diet per protocol   -Physical therapy/OT/Speech eval/treatment.     -DVT ppx: Heparin s.c [] LMWH [] SCD[x]    -maintain adequate hydration    -Na Goal: 135-145   -monitor for si/sx of infection   - LDL/A1C as noted   -Stroke education     -CARDIOVASCULAR: -check TTE, cardiac monitoring w/ telemetry for now, further evaluation pending findings of noted workup  , rate control                                 -HEMATOLOGY: H/H without anemia , Platelets 177, patient should have all age and risk appropriate malignancy screenings with PCP or sooner if clinically suspected   -LE duplex negative for DVT      DVT ppx: Heparin s.c [] LMWH [] SCD[x]     PULMONARY:  protecting airway, saturating well     RENAL: BUN/Cr within limits , monitor urine output, maintain adequate hydration       Na Goal:  135-145     Love: N    ID: febrile, no leukocytosis, monitor for si/sx of infection   -UA negative  -RVP negative  -LE doppler negative  -Pending CXR  -On IV vanco-trend trough    OTHER:  condition and plan of care d/w patient and wife at bedside. questions and concerns addressed.     DISPOSITION: Rehab or home depending on PT eval once stable and workup is complete        CORE MEASURES     Admission NIHSS:     Tenecteplase : [] YES [x] NO     LDL/HDL/A1C: as noted      Depression Screen- if depression hx and/or present     Statin Therapy: as noted      Dysphagia Screen: [x] PASS [] FAIL     Smoking in the past 12 months [] YES [x] NO       Afib [x] YES [] NO     Stroke Education [x] YES [] NO     Diabetes [] YES [x] NO       Obtain screening lower extremity venous ultrasound in patients who meet 1 or more of the following criteria as patient is high risk for DVT/PE on admission:   [] History of DVT/PE  []Hypercoagulable states (Factor V Leiden, Cancer, OCP, etc. )  []Prolonged immobility (hemiplegia/hemiparesis/post operative or any other extended immobilization)  [] Transferred from outside facility (Rehab or Long term care)  [] Age </= to 50  [x] Stroke    ASSESSMENT: 81yo M with PMHx GERD, HTN, BPH, CAD on baby aspirin, s/p CABG 2012, afib on xarelto s/p ablation, [place] [facility] [place]  [place] confusion and new onset vision loss. Per patient's wife, Pt returned from a doctor's appointment afternoon in his usual state of health, last known well 12pm 6/11/25. A few hours after around 4pm, Pt started experiencing dizziness, memory loss, forgetfulness, and vision loss. Pt was walking in the wrong rooms and placing items in incorrect places.   CT head with left parietal-occipital IPH, CT angiogram head/neck with multifocal intracranial atherosclerotic disease. MR brain demonstrated stable non enhancing moderate left occipital evangelist IPH with adjacent infarctions.     Impression: Left posterior-occipital IPH.  Post hemorrhagic acute adjacent infarcts. Etiology concerning for hypertension with underlying coagulopathy vs CAA. Follow up to resolution to exclude hemorrhagic transformation of ischemic infarction.     NEURO:   -Neurologically without acute change   -Continue close monitoring for neurologic deterioration  in the setting of cerebral edema with mass effect and brain compression.   - Stroke neuro checks q 2  hour  , VS q 2 hours   - Normotension as tolerated avoiding rapid fluctuations and hypotension, overall < 160/90mmHg    -ANTITHROMBOTIC THERAPY: on hold in setting of ICH, further timeline for initiation based on clinical course and serial imaging in coordination with nsx team   -home statin regimen if applicable   -MRI Brain w/ as noted , repeat in 4-6 weeks upon resolution for further evaluation. Unclear if true stroke vs related to underlying ich  -Dysphagia screen: passed, advance diet per protocol   -Physical therapy/OT/Speech eval/treatment.     -DVT ppx: Heparin s.c [] LMWH [] SCD[x]    -maintain adequate hydration    -Na Goal: 135-145   -monitor for si/sx of infection   - LDL/A1C as noted   -Stroke education     -CARDIOVASCULAR: -check TTE, cardiac monitoring w/ telemetry for now, further evaluation pending findings of noted workup  , rate control                                 -HEMATOLOGY: H/H without anemia , Platelets 177, patient should have all age and risk appropriate malignancy screenings with PCP or sooner if clinically suspected   -LE duplex negative for DVT      DVT ppx: Heparin s.c [] LMWH [] SCD[x]     PULMONARY:  protecting airway, saturating well     RENAL: BUN/Cr within limits , monitor urine output, maintain adequate hydration       Na Goal:  135-145     Love: N    ID: febrile, no leukocytosis, monitor for si/sx of infection   -UA negative  -RVP negative  -LE doppler negative  -Pending CXR  -On IV vanco-trend trough    OTHER:  condition and plan of care d/w patient and wife at bedside. questions and concerns addressed.   -discussed with patient and wife importance of complying with xarelto once restarted. Instructed to take medications as prescribed once a day at the same time. Verbalized understanding with teach back     DISPOSITION: Rehab or home depending on PT eval once stable and workup is complete        CORE MEASURES     Admission NIHSS:     Tenecteplase : [] YES [x] NO     LDL/HDL/A1C: as noted      Depression Screen- if depression hx and/or present     Statin Therapy: as noted      Dysphagia Screen: [x] PASS [] FAIL     Smoking in the past 12 months [] YES [x] NO       Afib [x] YES [] NO     Stroke Education [x] YES [] NO     Diabetes [] YES [x] NO       Obtain screening lower extremity venous ultrasound in patients who meet 1 or more of the following criteria as patient is high risk for DVT/PE on admission:   [] History of DVT/PE  []Hypercoagulable states (Factor V Leiden, Cancer, OCP, etc. )  []Prolonged immobility (hemiplegia/hemiparesis/post operative or any other extended immobilization)  [] Transferred from outside facility (Rehab or Long term care)  [] Age </= to 50  [x] Stroke

## 2025-06-14 NOTE — PROGRESS NOTE ADULT - SUBJECTIVE AND OBJECTIVE BOX
*** INCOMPLETE***  [facility] Stroke Team  Progress Note     HPI:  81yo M with PMHx GERD, HTN, BPH, CAD on baby aspirin, s/p CABG 2012, afib on xarelto s/p ablation, presented to [facility] [place]  with confusion and new onset vision loss. Per patient's wife, Pt returned from a doctor's appointment afternoon in his usual state of health, last known well 12pm 6/11/25. A few hours after around 4pm, Pt started experiencing dizziness, memory loss, forgetfulness, and vision loss. Pt was walking in the wrong rooms and placing items in incorrect places.  Pt was transferred to [facility] for further management after being found to have ICH.     SUBJECTIVE: No events overnight.  No new neurologic complaints.  ROS reported negative unless otherwise noted.    acetaminophen     Tablet .. 650 milliGRAM(s) Oral every 6 hours PRN  atorvastatin 40 milliGRAM(s) Oral at bedtime  chlorhexidine 2% Cloths 1 Application(s) Topical daily  dextrose 50% Injectable 25 Gram(s) IV Push once  divalproex  milliGRAM(s) Oral every 12 hours  hydrALAZINE Injectable 10 milliGRAM(s) IV Push every 2 hours PRN  insulin lispro (ADMELOG) corrective regimen sliding scale   SubCutaneous three times a day before meals  labetalol Injectable 10 milliGRAM(s) IV Push every 2 hours PRN  melatonin 3 milliGRAM(s) Oral at bedtime  metoprolol succinate ER 50 milliGRAM(s) Oral daily  oxybutynin XL 15 milliGRAM(s) Oral daily  pantoprazole    Tablet 40 milliGRAM(s) Oral before breakfast  polyethylene glycol 3350 17 Gram(s) Oral daily  sacubitril 24 mG/valsartan 26 mG 1 Tablet(s) Oral two times a day  senna 2 Tablet(s) Oral at bedtime      PHYSICAL EXAM:   Vital Signs Last 24 Hrs  T(C): 37.3 (14 Jun 2025 08:00), Max: 38.7 (13 Jun 2025 19:40)  T(F): 99.1 (14 Jun 2025 08:00), Max: 101.7 (13 Jun 2025 19:40)  HR: 82 (14 Jun 2025 08:00) (80 - 108)  BP: 165/67 (14 Jun 2025 08:00) (120/75 - 174/118)  BP(mean): 93 (14 Jun 2025 08:00) (82 - 135)  RR: 16 (14 Jun 2025 08:00) (16 - 22)  SpO2: 99% (14 Jun 2025 08:00) (96% - 100%)    Parameters below as of 14 Jun 2025 08:00  Patient On (Oxygen Delivery Method): room air        General: No acute distress.   HEENT: Head normocephalic, atraumatic. Conjunctivae clear w/o exudates or hemorrhage.       Respiratory: Lung sounds clear in all fields. Chest wall symmetric, nontender.   Abdominal: Soft, nondistended, nontender. Bowel sounds normoactive x 4 quadrants.    Skin: Skin is warm, dry    Extremities: No edema     Mental status: sleeping, awakens readily, right neglect,  The patient is oriented to person, place, time. The patient is oriented to current events. The patient is able to name objects, follow commands, repeat sentences.    Cranial nerves: RHHA, Pupils equal and react symmetrically to light. There is no visual field deficit to confrontation. Extraocular motion is full with no nystagmus. There is no ptosis. Facial sensation is intact.      Motor: There is normal bulk and tone.  There is no tremor.  Strength is 4/5 in the right arm with drift that does not hit bed  and 4/5 leg- drifts to bed.   Strength is 5/5 in the left arm and leg.    Sensation: Intact to light touch and pin in 4 extremities    Cerebellar: There is no dysmetria on finger to nose testing.    Gait : deferred  LABS:                                 14.9   8.96  )-----------( 210      ( 14 Jun 2025 02:20 )             46.8   06-14    134[L]  |  100  |  20.6[H]  ----------------------------<  106[H]  4.2   |  17.0[L]  |  1.23    Ca    8.7      14 Jun 2025 02:20  Phos  4.1     06-14  Mg     2.2     06-14    TPro  7.2  /  Alb  3.8  /  TBili  1.9  /  DBili  x   /  AST  19  /  ALT  10  /  AlkPhos  80  06-14      06-12 Chol 142 LDL -65 - HDL 64 Trig 66    A1C: 5.7        RADIOLOGY & ADDITIONAL STUDIES (independently reviewed unless otherwise noted):  Neuro-Imaging     CT Head No Cont (06.12.25 @ 21:06)   Stable size of left parietal lobe hematoma and unchanged local mass   effect comparing to 6/11/2025.    MR Head w/wo IV Cont (06.12.25 @ 13:05)   IMPRESSION: Stable nonenhancing moderate size left occipital lobe   hematoma with small adjacent acute anterior infarcts.    CT Head No Cont (06.12.25 @ 21:06)   Stable size of left parietal lobe hematoma and unchanged local mass   effect comparing to 6/11/2025.    CT Head No Cont (06.11.25 @ 22:47)   Stable left posterior parietal-occipital paracentral intraparenchymal   hematoma    6/11/25  CT HEAD:  Left posterior parietal-occipital paracentral intraparenchymal hematoma measuring 3.4 x 3.8 x 3.5 cm with surrounding edema and mass effect on the splenium of the corpus callosum and atrium of the left lateral ventricle. Consider further evaluation via pre and postcontrast MR imaging of the brain, provided the patient has no contraindications.    CTA NECK:  1. No evidence of significant stenosis or occlusion.  2. Right lobe of the thyroid gland is prominent appearance measuring 8.4 x 4.8 x 5.1 cm in diameter. The thyroid isthmus is also prominent appearance measuring up to 3 cm in diameter. Several nodules are appreciated within the bilateral lobes of the thyroid gland. Findings include a 1.7 x 1.6 cm nodule with peripheral calcification within the region of the thyroid isthmus. Consider follow-up dedicated thyroid ultrasound assessment on a nonemergent basis for further characterization, as clinically indicated and provided the patient has no contraindications.    CTA HEAD:  1. No large vessel occlusion.  2. Mild stenosis in association with the distal intracranial bilateral vertebral arteries..  3. Hypoplastic left P1 segment with a fetal origin of the left posterior cerebral artery.  4. Left A1 segment is hypoplastic. Bilateral A2 segments appear to fill via the right A1 segment.  5.No aneurysm identified. Tiny aneurysms can be beyond the resolution of CTA technique. No discrete AVM appreciated.    TTE W or WO Ultrasound Enhancing Agent (06.13.25 @ 17:14)   CONCLUSIONS:      1. Left ventricular systolic function is normal with an ejection fraction visually estimated at 55 to 60 %. There are no regional wall motion abnormalities seen.   2. There is normal LV mass and normal geometry.   3. The left ventricular diastolic function is indeterminate. Analysis of left ventricular diastolic function and filling pressure is made challenging by the presence of frequent ectopic beats.   4. Normal right ventricular cavity size and borderline reduced right ventricular systolic function.   5. Left atrium is moderately dilated.   6. Trileaflet aortic valve with reduced systolic excursion. There is moderate calcification of the aortic valve leaflets. Moderate aortic stenosis.   7. The peak transaortic velocity is 2.51 m/s, peak transaortic gradient is 25.2 mmHg and mean transaortic gradient is 14.0 mmHg with an LVOT/aortic valve VTI ratio of 0.34. The effective orifice area is estimated at 1.37 cm² by the continuity equation and indexed at 0.59 cm²/m².   8. There is mild posterior calcification of the mitral valve annulus.         [facility] Stroke Team  Progress Note     HPI:  79yo M with PMHx GERD, HTN, BPH, CAD on baby aspirin, s/p CABG 2012, afib on xarelto s/p ablation, presented to [facility] [place]  with confusion and new onset vision loss. Per patient's wife, Pt returned from a doctor's appointment afternoon in his usual state of health, last known well 12pm 6/11/25. A few hours after around 4pm, Pt started experiencing dizziness, memory loss, forgetfulness, and vision loss. Pt was walking in the wrong rooms and placing items in incorrect places.  Pt was transferred to [facility] for further management after being found to have ICH.     SUBJECTIVE: TMAX 101.7.  No new neurologic complaints.  ROS reported negative unless otherwise noted.    acetaminophen     Tablet .. 650 milliGRAM(s) Oral every 6 hours PRN  atorvastatin 40 milliGRAM(s) Oral at bedtime  chlorhexidine 2% Cloths 1 Application(s) Topical daily  dextrose 50% Injectable 25 Gram(s) IV Push once  divalproex  milliGRAM(s) Oral every 12 hours  hydrALAZINE Injectable 10 milliGRAM(s) IV Push every 2 hours PRN  insulin lispro (ADMELOG) corrective regimen sliding scale   SubCutaneous three times a day before meals  labetalol Injectable 10 milliGRAM(s) IV Push every 2 hours PRN  melatonin 3 milliGRAM(s) Oral at bedtime  metoprolol succinate ER 50 milliGRAM(s) Oral daily  oxybutynin XL 15 milliGRAM(s) Oral daily  pantoprazole    Tablet 40 milliGRAM(s) Oral before breakfast  polyethylene glycol 3350 17 Gram(s) Oral daily  sacubitril 24 mG/valsartan 26 mG 1 Tablet(s) Oral two times a day  senna 2 Tablet(s) Oral at bedtime      PHYSICAL EXAM:   Vital Signs Last 24 Hrs  T(C): 37.3 (14 Jun 2025 08:00), Max: 38.7 (13 Jun 2025 19:40)  T(F): 99.1 (14 Jun 2025 08:00), Max: 101.7 (13 Jun 2025 19:40)  HR: 82 (14 Jun 2025 08:00) (80 - 108)  BP: 165/67 (14 Jun 2025 08:00) (120/75 - 174/118)  BP(mean): 93 (14 Jun 2025 08:00) (82 - 135)  RR: 16 (14 Jun 2025 08:00) (16 - 22)  SpO2: 99% (14 Jun 2025 08:00) (96% - 100%)    Parameters below as of 14 Jun 2025 08:00  Patient On (Oxygen Delivery Method): room air        General: No acute distress.   HEENT: Head normocephalic, atraumatic. Conjunctivae clear w/o exudates or hemorrhage.       Respiratory: Lung sounds clear in all fields. Chest wall symmetric, nontender.   Abdominal: Soft, nondistended, nontender. Bowel sounds normoactive x 4 quadrants.    Skin: Skin is warm, dry    Extremities: No edema     Mental status: awake, alert, fully participating in exam. The patient is oriented to person, place, time. The patient is oriented to current events. The patient is able to name objects, follow commands, repeat sentences.    Cranial nerves: RHHA, Pupils equal and react symmetrically to light. There is no visual field deficit to confrontation. Extraocular motion is full with no nystagmus. There is no ptosis. Facial sensation is intact.      Motor: There is normal bulk and tone.  There is no tremor.  Strength is 4/5 in the right arm with drift that does not hit bed  and 4/5 leg.   Strength is 5/5 in the left arm and leg.    Sensation: RLE sensory neglect    Cerebellar: There is no dysmetria on finger to nose testing.    Gait : deferred  LABS:                                 14.9   8.96  )-----------( 210      ( 14 Jun 2025 02:20 )             46.8   06-14    134[L]  |  100  |  20.6[H]  ----------------------------<  106[H]  4.2   |  17.0[L]  |  1.23    Ca    8.7      14 Jun 2025 02:20  Phos  4.1     06-14  Mg     2.2     06-14    TPro  7.2  /  Alb  3.8  /  TBili  1.9  /  DBili  x   /  AST  19  /  ALT  10  /  AlkPhos  80  06-14      06-12 Chol 142 LDL -65 - HDL 64 Trig 66    A1C: 5.7        RADIOLOGY & ADDITIONAL STUDIES (independently reviewed unless otherwise noted):  Neuro-Imaging     CT Head No Cont (06.12.25 @ 21:06)   Stable size of left parietal lobe hematoma and unchanged local mass   effect comparing to 6/11/2025.    MR Head w/wo IV Cont (06.12.25 @ 13:05)   IMPRESSION: Stable nonenhancing moderate size left occipital lobe   hematoma with small adjacent acute anterior infarcts.    CT Head No Cont (06.12.25 @ 21:06)   Stable size of left parietal lobe hematoma and unchanged local mass   effect comparing to 6/11/2025.    CT Head No Cont (06.11.25 @ 22:47)   Stable left posterior parietal-occipital paracentral intraparenchymal   hematoma    6/11/25  CT HEAD:  Left posterior parietal-occipital paracentral intraparenchymal hematoma measuring 3.4 x 3.8 x 3.5 cm with surrounding edema and mass effect on the splenium of the corpus callosum and atrium of the left lateral ventricle. Consider further evaluation via pre and postcontrast MR imaging of the brain, provided the patient has no contraindications.    CTA NECK:  1. No evidence of significant stenosis or occlusion.  2. Right lobe of the thyroid gland is prominent appearance measuring 8.4 x 4.8 x 5.1 cm in diameter. The thyroid isthmus is also prominent appearance measuring up to 3 cm in diameter. Several nodules are appreciated within the bilateral lobes of the thyroid gland. Findings include a 1.7 x 1.6 cm nodule with peripheral calcification within the region of the thyroid isthmus. Consider follow-up dedicated thyroid ultrasound assessment on a nonemergent basis for further characterization, as clinically indicated and provided the patient has no contraindications.    CTA HEAD:  1. No large vessel occlusion.  2. Mild stenosis in association with the distal intracranial bilateral vertebral arteries..  3. Hypoplastic left P1 segment with a fetal origin of the left posterior cerebral artery.  4. Left A1 segment is hypoplastic. Bilateral A2 segments appear to fill via the right A1 segment.  5.No aneurysm identified. Tiny aneurysms can be beyond the resolution of CTA technique. No discrete AVM appreciated.    TTE W or WO Ultrasound Enhancing Agent (06.13.25 @ 17:14)   CONCLUSIONS:      1. Left ventricular systolic function is normal with an ejection fraction visually estimated at 55 to 60 %. There are no regional wall motion abnormalities seen.   2. There is normal LV mass and normal geometry.   3. The left ventricular diastolic function is indeterminate. Analysis of left ventricular diastolic function and filling pressure is made challenging by the presence of frequent ectopic beats.   4. Normal right ventricular cavity size and borderline reduced right ventricular systolic function.   5. Left atrium is moderately dilated.   6. Trileaflet aortic valve with reduced systolic excursion. There is moderate calcification of the aortic valve leaflets. Moderate aortic stenosis.   7. The peak transaortic velocity is 2.51 m/s, peak transaortic gradient is 25.2 mmHg and mean transaortic gradient is 14.0 mmHg with an LVOT/aortic valve VTI ratio of 0.34. The effective orifice area is estimated at 1.37 cm² by the continuity equation and indexed at 0.59 cm²/m².   8. There is mild posterior calcification of the mitral valve annulus.    US Duplex Venous Lower Ext Complete, Bilateral (06.14.25 @ 12:04)   IMPRESSION:  No evidence of deep venous thrombosis in either lower extremity.

## 2025-06-15 DIAGNOSIS — I10 ESSENTIAL (PRIMARY) HYPERTENSION: ICD-10-CM

## 2025-06-15 LAB
ANION GAP SERPL CALC-SCNC: 15 MMOL/L — SIGNIFICANT CHANGE UP (ref 5–17)
BUN SERPL-MCNC: 19.2 MG/DL — SIGNIFICANT CHANGE UP (ref 8–20)
CALCIUM SERPL-MCNC: 8.6 MG/DL — SIGNIFICANT CHANGE UP (ref 8.4–10.5)
CHLORIDE SERPL-SCNC: 103 MMOL/L — SIGNIFICANT CHANGE UP (ref 96–108)
CO2 SERPL-SCNC: 19 MMOL/L — LOW (ref 22–29)
CREAT SERPL-MCNC: 0.99 MG/DL — SIGNIFICANT CHANGE UP (ref 0.5–1.3)
EGFR: 78 ML/MIN/1.73M2 — SIGNIFICANT CHANGE UP
EGFR: 78 ML/MIN/1.73M2 — SIGNIFICANT CHANGE UP
GLUCOSE SERPL-MCNC: 118 MG/DL — HIGH (ref 70–99)
HCT VFR BLD CALC: 45.8 % — SIGNIFICANT CHANGE UP (ref 39–50)
HGB BLD-MCNC: 14.8 G/DL — SIGNIFICANT CHANGE UP (ref 13–17)
MAGNESIUM SERPL-MCNC: 2 MG/DL — SIGNIFICANT CHANGE UP (ref 1.6–2.6)
MCHC RBC-ENTMCNC: 25.2 PG — LOW (ref 27–34)
MCHC RBC-ENTMCNC: 32.3 G/DL — SIGNIFICANT CHANGE UP (ref 32–36)
MCV RBC AUTO: 77.9 FL — LOW (ref 80–100)
NRBC # BLD AUTO: 0 K/UL — SIGNIFICANT CHANGE UP (ref 0–0)
NRBC # FLD: 0 K/UL — SIGNIFICANT CHANGE UP (ref 0–0)
NRBC BLD AUTO-RTO: 0 /100 WBCS — SIGNIFICANT CHANGE UP (ref 0–0)
PHOSPHATE SERPL-MCNC: 3.6 MG/DL — SIGNIFICANT CHANGE UP (ref 2.4–4.7)
PLATELET # BLD AUTO: 200 K/UL — SIGNIFICANT CHANGE UP (ref 150–400)
PMV BLD: 10.8 FL — SIGNIFICANT CHANGE UP (ref 7–13)
POTASSIUM SERPL-MCNC: 4.1 MMOL/L — SIGNIFICANT CHANGE UP (ref 3.5–5.3)
POTASSIUM SERPL-SCNC: 4.1 MMOL/L — SIGNIFICANT CHANGE UP (ref 3.5–5.3)
RBC # BLD: 5.88 M/UL — HIGH (ref 4.2–5.8)
RBC # FLD: 16 % — HIGH (ref 10.3–14.5)
SODIUM SERPL-SCNC: 137 MMOL/L — SIGNIFICANT CHANGE UP (ref 135–145)
WBC # BLD: 7.11 K/UL — SIGNIFICANT CHANGE UP (ref 3.8–10.5)
WBC # FLD AUTO: 7.11 K/UL — SIGNIFICANT CHANGE UP (ref 3.8–10.5)

## 2025-06-15 PROCEDURE — 70450 CT HEAD/BRAIN W/O DYE: CPT | Mod: 26

## 2025-06-15 PROCEDURE — 99233 SBSQ HOSP IP/OBS HIGH 50: CPT | Mod: FS

## 2025-06-15 PROCEDURE — 99223 1ST HOSP IP/OBS HIGH 75: CPT

## 2025-06-15 PROCEDURE — G0545: CPT

## 2025-06-15 RX ORDER — CARVEDILOL 3.12 MG/1
6.25 TABLET, FILM COATED ORAL EVERY 12 HOURS
Refills: 0 | Status: DISCONTINUED | OUTPATIENT
Start: 2025-06-16 | End: 2025-06-16

## 2025-06-15 RX ORDER — VANCOMYCIN HCL IN 5 % DEXTROSE 1.5G/250ML
1500 PLASTIC BAG, INJECTION (ML) INTRAVENOUS EVERY 12 HOURS
Refills: 0 | Status: DISCONTINUED | OUTPATIENT
Start: 2025-06-15 | End: 2025-06-15

## 2025-06-15 RX ADMIN — SACUBITRIL AND VALSARTAN 1 TABLET(S): 49; 51 TABLET, FILM COATED ORAL at 17:06

## 2025-06-15 RX ADMIN — Medication 300 MILLIGRAM(S): at 05:07

## 2025-06-15 RX ADMIN — METOPROLOL SUCCINATE 50 MILLIGRAM(S): 50 TABLET, EXTENDED RELEASE ORAL at 05:07

## 2025-06-15 RX ADMIN — Medication 50 MILLILITER(S): at 16:34

## 2025-06-15 RX ADMIN — Medication 75 MILLILITER(S): at 07:46

## 2025-06-15 RX ADMIN — Medication 650 MILLIGRAM(S): at 01:00

## 2025-06-15 RX ADMIN — Medication 50 MILLILITER(S): at 22:00

## 2025-06-15 RX ADMIN — Medication 10 MILLIGRAM(S): at 00:21

## 2025-06-15 RX ADMIN — SACUBITRIL AND VALSARTAN 1 TABLET(S): 49; 51 TABLET, FILM COATED ORAL at 05:06

## 2025-06-15 RX ADMIN — Medication 650 MILLIGRAM(S): at 05:58

## 2025-06-15 RX ADMIN — Medication 3 MILLIGRAM(S): at 22:00

## 2025-06-15 RX ADMIN — Medication 650 MILLIGRAM(S): at 23:00

## 2025-06-15 RX ADMIN — OXYBUTYNIN CHLORIDE 15 MILLIGRAM(S): 5 TABLET, FILM COATED, EXTENDED RELEASE ORAL at 17:04

## 2025-06-15 RX ADMIN — Medication 50 MILLILITER(S): at 17:03

## 2025-06-15 RX ADMIN — LABETALOL HYDROCHLORIDE 10 MILLIGRAM(S): 200 TABLET, FILM COATED ORAL at 22:08

## 2025-06-15 RX ADMIN — Medication 50 MILLILITER(S): at 17:13

## 2025-06-15 RX ADMIN — ENOXAPARIN SODIUM 40 MILLIGRAM(S): 100 INJECTION SUBCUTANEOUS at 17:13

## 2025-06-15 RX ADMIN — ATORVASTATIN CALCIUM 40 MILLIGRAM(S): 80 TABLET, FILM COATED ORAL at 22:00

## 2025-06-15 RX ADMIN — Medication 650 MILLIGRAM(S): at 22:00

## 2025-06-15 RX ADMIN — Medication 1 APPLICATION(S): at 17:13

## 2025-06-15 RX ADMIN — LABETALOL HYDROCHLORIDE 10 MILLIGRAM(S): 200 TABLET, FILM COATED ORAL at 16:35

## 2025-06-15 RX ADMIN — Medication 650 MILLIGRAM(S): at 06:57

## 2025-06-15 RX ADMIN — Medication 40 MILLIGRAM(S): at 05:06

## 2025-06-15 NOTE — CONSULT NOTE ADULT - SUBJECTIVE AND OBJECTIVE BOX
[facility]  INFECTIOUS DISEASES at [place] and [place]  =====================================================         Reno Suh MD                                                        Diplomates American Board of Internal Medicine & Infectious Diseases                * [place] Office - Appt - Tel  [phone] Fax [phone]                * [place] Office - Appt - Tel [phone] Fax [phone]                                  Hospital Consult line:  [phone]  =====================================================      [facility]-[id number]  NAVJOT ERICKSON        CC: Patient is a 80y old  Male who presents with a chief complaint of ICH (15 Yazan 2025 12:26)      80y  Male     HPI:  79yo M with PMHx GERD, HTN, BPH, CAD on baby aspirin, s/p CABG 2012, afib on xarelto s/p ablation, presented to [facility] today with confusion and new onset vision loss. Per patient's wife, Pt returned from a doctor's appointment this afternoon in his usual state of health, LKN 12pm. A few hours after around 4pm, Pt started experiencing dizziness, memory loss, forgetfulness, and vision loss. Pt was walking in the wrong rooms and placing items in incorrect places. CTH revealed an acute left parieto-occipital ICH. 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 Pt was given andexxa for xarelto reversal. SBP also noted to be in 200s, started on cardene drip. Pt denies headaches, N/V/D, CP, SOB, neck pain, recent injury to head/neck. NIHSS 5. ICH score 1. MRS 0. Pt was transferred to [facility] for further management.  (11 Jun 2025 23:28)    ______________________________________________________  PAST MEDICAL & SURGICAL HISTORY:  CAD in native artery  s/p cabg      Afib  s/p carsioversion May 2020      HTN (hypertension)      HLD (hyperlipidemia)      BPH with urinary obstruction      Obesity      Urolithiasis      S/P CABG x 4          Social History:  Pt denies tobacco use, (former smoker)  Pt drinks alcohol socially  Denies illicit drug use (11 Jun 2025 23:28)    Occupation:  Travel:  Pets:  Drugs:  Alcohol:     FAMILY HISTORY:  FH: CVA (cerebrovascular accident) (Father)        ______________________________________________________  Allergies    Biaxin (Rash; Urticaria; Nausea)    Intolerances        ______________________________________________________  MEDICATIONS:  Antibiotics:    Other medications:  atorvastatin 40 milliGRAM(s) Oral at bedtime  chlorhexidine 2% Cloths 1 Application(s) Topical daily  dextrose 50% Injectable 25 Gram(s) IV Push once  enoxaparin Injectable 40 milliGRAM(s) SubCutaneous every 24 hours  melatonin 3 milliGRAM(s) Oral at bedtime  metoprolol succinate ER 50 milliGRAM(s) Oral daily  oxybutynin XL 15 milliGRAM(s) Oral daily  pantoprazole    Tablet 40 milliGRAM(s) Oral before breakfast  polyethylene glycol 3350 17 Gram(s) Oral daily  sacubitril 24 mG/valsartan 26 mG 1 Tablet(s) Oral two times a day  senna 2 Tablet(s) Oral at bedtime  sodium chloride 0.9%. 1000 milliLiter(s) IV Continuous <Continuous>    ______________________________________________________  REVIEW OF SYSTEMS:  CONSTITUTIONAL:  No fever or chills  HEENT:  No diplopia or blurred vision.  No earache, sore throat or runny nose.  CARDIOVASCULAR:  No chest pain  RESPIRATORY:  No cough, shortness of breath  GASTROINTESTINAL:  No nausea, vomiting, abdominal pain or diarrhea.  GENITOURINARY:  No dysuria, frequency or urgency. No blood in urine  MUSCULOSKELETAL:  no joint aches, no muscle pain  SKIN:  No change in skin, hair or nails.  NEUROLOGIC:  No headaches, seizures  PSYCHIATRIC:  No disorder of thought or mood.  ENDOCRINE:  No heat or cold intolerance  HEMATOLOGICAL:  No easy bruising or bleeding.     _____________________________________________________  PHYSICAL EXAM:  GEN: AAOx4. Lying comfortable in bed, in no acute distress   HEENT: normocephalic and atraumatic. EOMI. PERRL.  Anicteric sclerae. Moist mucous membranes. No mucosal lesions. No nasal discharge.   NECK: Supple. No palpable neck masses or LN  LUNGS: eupneic, CTA B/L, no adventitious sounds  HEART: RRR, no m/r/g  ABDOMEN: Soft, NT, ND, no hepatosplenomegally, no palpable masses.  +BS.    : No CVA tenderness, no Love catheter  EXTREMITIES: well perfused, without  edema.  MSK: No joint deformity or swelling  LYMPH: no palpable cervical, supraclavicular, axillary or inguinal lymph nodes  NEUROLOGIC: Grossly no motor focal deficits   PSYCHIATRIC: Appropriate affect and mood.  SKIN: No rash, wounds or jaundice  LINES: PIV, no phlebitis     ______________________________________________________      Vitals:  T(F): 98.2 (15 Yazan 2025 07:47), Max: 99 (14 Jun 2025 16:27)  HR: 86 (15 Yazan 2025 12:00)  BP: 137/117 (15 Yazan 2025 12:00)  RR: 16 (15 Yazan 2025 12:00)  SpO2: 98% (15 Yazan 2025 12:00) (95% - 100%)  temp max in last 48H T(F): , Max: 101.7 (06-13-25 @ 19:40)    Current Antibiotics:    Other medications:  atorvastatin 40 milliGRAM(s) Oral at bedtime  chlorhexidine 2% Cloths 1 Application(s) Topical daily  dextrose 50% Injectable 25 Gram(s) IV Push once  enoxaparin Injectable 40 milliGRAM(s) SubCutaneous every 24 hours  melatonin 3 milliGRAM(s) Oral at bedtime  metoprolol succinate ER 50 milliGRAM(s) Oral daily  oxybutynin XL 15 milliGRAM(s) Oral daily  pantoprazole    Tablet 40 milliGRAM(s) Oral before breakfast  polyethylene glycol 3350 17 Gram(s) Oral daily  sacubitril 24 mG/valsartan 26 mG 1 Tablet(s) Oral two times a day  senna 2 Tablet(s) Oral at bedtime  sodium chloride 0.9%. 1000 milliLiter(s) IV Continuous <Continuous>                            14.8   7.11  )-----------( 200      ( 15 Yazan 2025 06:20 )             45.8     06-15    137  |  103  |  19.2  ----------------------------<  118[H]  4.1   |  19.0[L]  |  0.99    Ca    8.6      15 Yazan 2025 06:20  Phos  3.6     06-15  Mg     2.0     06-15    TPro  7.2  /  Alb  3.8  /  TBili  1.9  /  DBili  x   /  AST  19  /  ALT  10  /  AlkPhos  80  06-14    RECENT CULTURES:  06-14 @ 02:20 Blood Blood-Peripheral     No growth at 24 hours        06-14 @ 02:15 Blood Blood-Peripheral     No growth at 24 hours        06-13 @ 19:55          NotDetec      WBC Count: 7.11 K/uL (06-15-25 @ 06:20)  WBC Count: 8.96 K/uL (06-14-25 @ 02:20)  WBC Count: 8.00 K/uL (06-13-25 @ 03:42)  WBC Count: 8.17 K/uL (06-12-25 @ 06:00)  WBC Count: 9.68 K/uL (06-12-25 @ 00:00)    Creatinine: 0.99 mg/dL (06-15-25 @ 06:20)  Creatinine: 1.23 mg/dL (06-14-25 @ 02:20)  Creatinine: 1.04 mg/dL (06-13-25 @ 03:42)  Creatinine: 0.98 mg/dL (06-12-25 @ 06:00)  Creatinine: 0.94 mg/dL (06-12-25 @ 00:00)           SARS-CoV-2: NotDetec (06-13-25 @ 19:55)        ______________________________________________________  CARDIOLOGY    ______________________________________________________  RADIOLOGY [facility]  INFECTIOUS DISEASES at [place] and [place]  =====================================================         Reno Suh MD                                                        Diplomates American Board of Internal Medicine & Infectious Diseases                * [place] Office - Appt - Tel  [phone] Fax [phone]                * [place] Office - Appt - Tel [phone] Fax [phone]                                  Hospital Consult line:  [phone]  =====================================================      [facility]-[id number]  NAVJOT MIGELFREEDOM        CC: Patient is a 80y old  Male who presents with a chief complaint of ICH (15 Yazan 2025 12:26)    HPI: 81yo M with PMHx GERD, HTN, BPH, CAD on baby aspirin, s/p CABG 2012, afib on xarelto s/p ablation, presented to [facility] today with confusion and new onset vision loss. Per patient's wife, Pt returned from a doctor's appointment this afternoon in his usual state of health, LKN 12pm. A few hours after around 4pm, Pt started experiencing dizziness, memory loss, forgetfulness, and vision loss. Pt was walking in the wrong rooms and placing items in incorrect places. CTH revealed an acute left parieto-occipital ICH. 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 Pt was given andexxa for xarelto reversal. SBP also noted to be in 200s, started on cardene drip. Pt denies headaches, N/V/D, CP, SOB, neck pain, recent injury to head/neck. NIHSS 5. ICH score 1. MRS 0. Pt was transferred to [facility] for further management.  (11 Jun 2025 23:28)    ID consulted for Pneumonia    Chart reviewed - patient seen and examined   Wife at bedside contributing with addtional information     Denies CP, SOB. Chronic cough for years unchanged.   No nausea, vomiting. Has some loose BMs (was on Miralax)  Still missing lots of memories, particularly of recent events (i.e. yesterday he was unable to name the president, today he remembers is Trump)    Wife reports that daughter and another family member are sick with a cold and fever   ______________________________________________________  PAST MEDICAL & SURGICAL HISTORY:  CAD s/p CABG   Afib  HTN (hypertension)  HLD (hyperlipidemia)  BPH with urinary obstruction  Urolithiasis      Social History:  Former smoker  Pt drinks alcohol socially  Denies illicit drug use (11 Jun 2025 23:28)    FAMILY HISTORY:  FH: CVA (cerebrovascular accident) (Father)        ______________________________________________________  Allergies    Biaxin (Rash; Urticaria; Nausea)    Intolerances      ______________________________________________________  REVIEW OF SYSTEMS:  CONSTITUTIONAL:  No fever or chills  HEENT:  No diplopia or blurred vision.  No earache, sore throat or runny nose.  CARDIOVASCULAR:  No chest pain  RESPIRATORY:  No cough, shortness of breath  GASTROINTESTINAL:  No nausea, vomiting, abdominal pain or diarrhea.  GENITOURINARY:  No dysuria, frequency or urgency. No blood in urine  MUSCULOSKELETAL:  no joint aches, no muscle pain  SKIN:  No change in skin, hair or nails.  NEUROLOGIC:  No headaches, seizures  PSYCHIATRIC:  No disorder of thought or mood.  ENDOCRINE:  No heat or cold intolerance  HEMATOLOGICAL:  No easy bruising or bleeding.     _____________________________________________________  PHYSICAL EXAM:  GEN: AAOx4. Lying comfortable in bed, in no acute distress   HEENT: normocephalic and atraumatic. EOMI. PERRL.  Anicteric sclerae. Moist mucous membranes. No mucosal lesions. No nasal discharge.   NECK: Supple. No palpable neck masses or LN  LUNGS: eupneic, CTA B/L, no adventitious sounds  HEART: RRR, no m/r/g  ABDOMEN: Soft, NT, ND, no hepatosplenomegally, no palpable masses.  +BS.    : No CVA tenderness, no Love catheter  EXTREMITIES: well perfused, without  edema.  MSK: No joint deformity or swelling  LYMPH: no palpable cervical, supraclavicular, axillary or inguinal lymph nodes  NEUROLOGIC: Grossly no motor focal deficits   PSYCHIATRIC: Appropriate affect and mood.  SKIN: No rash, wounds or jaundice  LINES: PIV, no phlebitis     ______________________________________________________      Vitals:  T(F): 98.2 (15 Yazan 2025 07:47), Max: 99 (14 Jun 2025 16:27)  HR: 86 (15 Yazan 2025 12:00)  BP: 137/117 (15 Yazan 2025 12:00)  RR: 16 (15 Yazan 2025 12:00)  SpO2: 98% (15 Yazan 2025 12:00) (95% - 100%)  temp max in last 48H T(F): , Max: 101.7 (06-13-25 @ 19:40)    Current Antibiotics:  vancomycin IN 1500 mg q12h     Other medications:  atorvastatin 40 milliGRAM(s) Oral at bedtime  chlorhexidine 2% Cloths 1 Application(s) Topical daily  dextrose 50% Injectable 25 Gram(s) IV Push once  enoxaparin Injectable 40 milliGRAM(s) SubCutaneous every 24 hours  melatonin 3 milliGRAM(s) Oral at bedtime  metoprolol succinate ER 50 milliGRAM(s) Oral daily  oxybutynin XL 15 milliGRAM(s) Oral daily  pantoprazole    Tablet 40 milliGRAM(s) Oral before breakfast  polyethylene glycol 3350 17 Gram(s) Oral daily  sacubitril 24 mG/valsartan 26 mG 1 Tablet(s) Oral two times a day  senna 2 Tablet(s) Oral at bedtime  sodium chloride 0.9%. 1000 milliLiter(s) IV Continuous <Continuous>                            14.8   7.11  )-----------( 200      ( 15 Yazan 2025 06:20 )             45.8     06-15    137  |  103  |  19.2  ----------------------------<  118[H]  4.1   |  19.0[L]  |  0.99    Ca    8.6      15 Yazan 2025 06:20  Phos  3.6     06-15  Mg     2.0     06-15    TPro  7.2  /  Alb  3.8  /  TBili  1.9  /  DBili  x   /  AST  19  /  ALT  10  /  AlkPhos  80  06-14    RECENT CULTURES:  06-14 @ 02:20 Blood Blood-Peripheral     No growth at 24 hours        06-14 @ 02:15 Blood Blood-Peripheral     No growth at 24 hours        06-13 @ 19:55 RVP with SARS-CoV-2   NotDetec      WBC Count: 7.11 K/uL (06-15-25 @ 06:20)  WBC Count: 8.96 K/uL (06-14-25 @ 02:20)  WBC Count: 8.00 K/uL (06-13-25 @ 03:42)  WBC Count: 8.17 K/uL (06-12-25 @ 06:00)  WBC Count: 9.68 K/uL (06-12-25 @ 00:00)    Creatinine: 0.99 mg/dL (06-15-25 @ 06:20)  Creatinine: 1.23 mg/dL (06-14-25 @ 02:20)  Creatinine: 1.04 mg/dL (06-13-25 @ 03:42)  Creatinine: 0.98 mg/dL (06-12-25 @ 06:00)  Creatinine: 0.94 mg/dL (06-12-25 @ 00:00)      SARS-CoV-2: NotDetec (06-13-25 @ 19:55)    ______________________________________________________  CARDIOLOGY    ______________________________________________________  RADIOLOGY  < from: MR Head w/wo IV Cont (06.12.25 @ 13:05) >  IMPRESSION: Stable nonenhancing moderate size left occipital lobe   hematoma with small adjacent acute anterior infarcts.    < end of copied text >  < from: Xray Chest 1 View- PORTABLE-Routine (Xray Chest 1 View- PORTABLE-Routine .) (06.13.25 @ 20:34) >    IMPRESSION:    Left lower lung patchy opacity, may represent pneumonia in the   appropriate clinical setting.    < end of copied text >    < from: CT Head No Cont (06.12.25 @ 21:06) >  FINDINGS:    No new or increasing intracranial hemorrhage.    Hyperdense parenchymal hemorrhage in the left medial inferior parietal   lobe there is unchanged volume, maximum 3.3 cm transverse on axial image   31 and 5.2 cm oblique anterior posterior on sagittal image 27. There is   surrounding white matter edema which is similar to before, and there is   mild effacement of left lateral ventricle posteriorly without   hydrocephalus. No significant midline shift. No intraventricular or   subarachnoid extension. No subdural hemorrhage. No downward mass effect.    Bone window images are nonacute and unchanged.      IMPRESSION:    Stable size of left parietal lobe hematoma and unchanged local mass   effect comparing to 6/11/2025.    < end of copied text >

## 2025-06-15 NOTE — PROGRESS NOTE ADULT - SUBJECTIVE AND OBJECTIVE BOX
Preliminary note, official recommendations pending attending review/signature   Seen and examined by Stroke team attending/team, assessment/ plan as discussed with stroke team attending/team as noted.     [facility] Stroke Team  Progress Note     HPI:  79yo M with PMHx GERD, HTN, BPH, CAD on baby aspirin, s/p CABG 2012, afib on xarelto s/p ablation, presented to [facility] [place]  with confusion and new onset vision loss. Per patient's wife, Pt returned from a doctor's appointment afternoon in his usual state of health, last known well 12pm 6/11/25. A few hours after around 4pm, Pt started experiencing dizziness, memory loss, forgetfulness, and vision loss. Pt was walking in the wrong rooms and placing items in incorrect places.  Pt was transferred to [facility] for further management after being found to have ICH.     SUBJECTIVE: Noted headache overnight s/p mild relief with Tylenol. No new neurologic complaints.  ROS reported negative unless otherwise noted.    acetaminophen     Tablet .. 650 milliGRAM(s) Oral every 6 hours PRN  atorvastatin 40 milliGRAM(s) Oral at bedtime  chlorhexidine 2% Cloths 1 Application(s) Topical daily  dextrose 50% Injectable 25 Gram(s) IV Push once  enoxaparin Injectable 40 milliGRAM(s) SubCutaneous every 24 hours  hydrALAZINE Injectable 10 milliGRAM(s) IV Push every 2 hours PRN  insulin lispro (ADMELOG) corrective regimen sliding scale   SubCutaneous three times a day before meals  labetalol Injectable 10 milliGRAM(s) IV Push every 2 hours PRN  melatonin 3 milliGRAM(s) Oral at bedtime  metoprolol succinate ER 50 milliGRAM(s) Oral daily  oxybutynin XL 15 milliGRAM(s) Oral daily  pantoprazole    Tablet 40 milliGRAM(s) Oral before breakfast  polyethylene glycol 3350 17 Gram(s) Oral daily  sacubitril 24 mG/valsartan 26 mG 1 Tablet(s) Oral two times a day  senna 2 Tablet(s) Oral at bedtime  sodium chloride 0.9%. 1000 milliLiter(s) IV Continuous <Continuous>  vancomycin  IVPB      vancomycin  IVPB 1500 milliGRAM(s) IV Intermittent every 12 hours      PHYSICAL EXAM:   Vital Signs Last 24 Hrs  T(C): 36.8 (15 Yazan 2025 07:47), Max: 37.2 (14 Jun 2025 16:27)  T(F): 98.2 (15 Yazan 2025 07:47), Max: 99 (14 Jun 2025 16:27)  HR: 95 (15 Yazan 2025 08:00) (86 - 98)  BP: 148/101 (15 Yazan 2025 08:09) (116/74 - 166/99)  BP(mean): 116 (15 Yazan 2025 08:09) (82 - 125)  RR: 15 (15 Yazan 2025 08:00) (14 - 19)  SpO2: 99% (15 Yazan 2025 08:00) (95% - 100%)    Parameters below as of 15 Yazan 2025 08:00  Patient On (Oxygen Delivery Method): room air      General: No acute distress.   HEENT: Head normocephalic, atraumatic. Conjunctivae clear w/o exudates or hemorrhage.       Respiratory: Lung sounds clear in all fields. Chest wall symmetric, nontender.   Abdominal: Soft, nondistended, nontender. Bowel sounds normoactive x 4 quadrants.    Skin: Skin is warm, dry    Extremities: No edema     Mental status: awake, alert, fully participating in exam. The patient is oriented to person, place, time. The patient is oriented to current events. The patient is able to name objects, follow commands, repeat sentences.    Cranial nerves: RHHA, Pupils equal and react symmetrically to light. There is no visual field deficit to confrontation. Extraocular motion is full with no nystagmus. There is no ptosis. Facial sensation is intact.      Motor: There is normal bulk and tone.  There is no tremor.  Strength is 4/5 in the right arm with drift that does not hit bed  and 4/5 leg.   Strength is 5/5 in the left arm and leg.    Sensation: RLE sensory neglect    Cerebellar: There is no dysmetria on finger to nose testing.    Gait : deferred      LABS:                        14.8   7.11  )-----------( 200      ( 15 Yazan 2025 06:20 )             45.8    06-15    137  |  103  |  19.2  ----------------------------<  118[H]  4.1   |  19.0[L]  |  0.99    Ca    8.6      15 Yazan 2025 06:20  Phos  3.6     06-15  Mg     2.0     06-15    TPro  7.2  /  Alb  3.8  /  TBili  1.9  /  DBili  x   /  AST  19  /  ALT  10  /  AlkPhos  80  06-14 06-12 Chol 142 LDL -65 - HDL 64 Trig 66    A1C: 5.7        RADIOLOGY & ADDITIONAL STUDIES (independently reviewed unless otherwise noted):  Neuro-Imaging     CT Head No Cont (06.12.25 @ 21:06)   Stable size of left parietal lobe hematoma and unchanged local mass   effect comparing to 6/11/2025.    MR Head w/wo IV Cont (06.12.25 @ 13:05)   IMPRESSION: Stable nonenhancing moderate size left occipital lobe   hematoma with small adjacent acute anterior infarcts.    CT Head No Cont (06.12.25 @ 21:06)   Stable size of left parietal lobe hematoma and unchanged local mass   effect comparing to 6/11/2025.    CT Head No Cont (06.11.25 @ 22:47)   Stable left posterior parietal-occipital paracentral intraparenchymal   hematoma    6/11/25  CT HEAD:  Left posterior parietal-occipital paracentral intraparenchymal hematoma measuring 3.4 x 3.8 x 3.5 cm with surrounding edema and mass effect on the splenium of the corpus callosum and atrium of the left lateral ventricle. Consider further evaluation via pre and postcontrast MR imaging of the brain, provided the patient has no contraindications.    CTA NECK:  1. No evidence of significant stenosis or occlusion.  2. Right lobe of the thyroid gland is prominent appearance measuring 8.4 x 4.8 x 5.1 cm in diameter. The thyroid isthmus is also prominent appearance measuring up to 3 cm in diameter. Several nodules are appreciated within the bilateral lobes of the thyroid gland. Findings include a 1.7 x 1.6 cm nodule with peripheral calcification within the region of the thyroid isthmus. Consider follow-up dedicated thyroid ultrasound assessment on a nonemergent basis for further characterization, as clinically indicated and provided the patient has no contraindications.    CTA HEAD:  1. No large vessel occlusion.  2. Mild stenosis in association with the distal intracranial bilateral vertebral arteries..  3. Hypoplastic left P1 segment with a fetal origin of the left posterior cerebral artery.  4. Left A1 segment is hypoplastic. Bilateral A2 segments appear to fill via the right A1 segment.  5.No aneurysm identified. Tiny aneurysms can be beyond the resolution of CTA technique. No discrete AVM appreciated.  --  Cardiology   TTE W or WO Ultrasound Enhancing Agent (06.13.25 @ 17:14)   CONCLUSIONS:      1. Left ventricular systolic function is normal with an ejection fraction visually estimated at 55 to 60 %. There are no regional wall motion abnormalities seen.   2. There is normal LV mass and normal geometry.   3. The left ventricular diastolic function is indeterminate. Analysis of left ventricular diastolic function and filling pressure is made challenging by the presence of frequent ectopic beats.   4. Normal right ventricular cavity size and borderline reduced right ventricular systolic function.   5. Left atrium is moderately dilated.   6. Trileaflet aortic valve with reduced systolic excursion. There is moderate calcification of the aortic valve leaflets. Moderate aortic stenosis.   7. The peak transaortic velocity is 2.51 m/s, peak transaortic gradient is 25.2 mmHg and mean transaortic gradient is 14.0 mmHg with an LVOT/aortic valve VTI ratio of 0.34. The effective orifice area is estimated at 1.37 cm² by the continuity equation and indexed at 0.59 cm²/m².   8. There is mild posterior calcification of the mitral valve annulus.  --  Ultrasound   US Duplex Venous Lower Ext Complete, Bilateral (06.14.25 @ 12:04)   IMPRESSION:  No evidence of deep venous thrombosis in either lower extremity.             Preliminary note, official recommendations pending attending review/signature   Seen and examined by Stroke team attending/team, assessment/ plan as discussed with stroke team attending/team as noted.     [facility] Stroke Team  Progress Note     HPI:  81yo M with PMHx GERD, HTN, BPH, CAD on baby aspirin, s/p CABG 2012, afib on xarelto s/p ablation, presented to [facility] [place]  with confusion and new onset vision loss. Per patient's wife, Pt returned from a doctor's appointment afternoon in his usual state of health, last known well 12pm 6/11/25. A few hours after around 4pm, Pt started experiencing dizziness, memory loss, forgetfulness, and vision loss. Pt was walking in the wrong rooms and placing items in incorrect places.  Pt was transferred to [facility] for further management after being found to have ICH.     SUBJECTIVE: Noted headache overnight s/p mild relief with Tylenol. No new neurologic complaints.  ROS reported negative unless otherwise noted.    acetaminophen     Tablet .. 650 milliGRAM(s) Oral every 6 hours PRN  atorvastatin 40 milliGRAM(s) Oral at bedtime  chlorhexidine 2% Cloths 1 Application(s) Topical daily  dextrose 50% Injectable 25 Gram(s) IV Push once  enoxaparin Injectable 40 milliGRAM(s) SubCutaneous every 24 hours  hydrALAZINE Injectable 10 milliGRAM(s) IV Push every 2 hours PRN  insulin lispro (ADMELOG) corrective regimen sliding scale   SubCutaneous three times a day before meals  labetalol Injectable 10 milliGRAM(s) IV Push every 2 hours PRN  melatonin 3 milliGRAM(s) Oral at bedtime  metoprolol succinate ER 50 milliGRAM(s) Oral daily  oxybutynin XL 15 milliGRAM(s) Oral daily  pantoprazole    Tablet 40 milliGRAM(s) Oral before breakfast  polyethylene glycol 3350 17 Gram(s) Oral daily  sacubitril 24 mG/valsartan 26 mG 1 Tablet(s) Oral two times a day  senna 2 Tablet(s) Oral at bedtime  sodium chloride 0.9%. 1000 milliLiter(s) IV Continuous <Continuous>  vancomycin  IVPB      vancomycin  IVPB 1500 milliGRAM(s) IV Intermittent every 12 hours      PHYSICAL EXAM:   Vital Signs Last 24 Hrs  T(C): 36.8 (15 Yazan 2025 07:47), Max: 37.2 (14 Jun 2025 16:27)  T(F): 98.2 (15 Yazan 2025 07:47), Max: 99 (14 Jun 2025 16:27)  HR: 95 (15 Yazan 2025 08:00) (86 - 98)  BP: 148/101 (15 Yazan 2025 08:09) (116/74 - 166/99)  BP(mean): 116 (15 Yazan 2025 08:09) (82 - 125)  RR: 15 (15 Yazan 2025 08:00) (14 - 19)  SpO2: 99% (15 Yazan 2025 08:00) (95% - 100%)    Parameters below as of 15 Yazan 2025 08:00  Patient On (Oxygen Delivery Method): room air      General: No acute distress.   HEENT: Head normocephalic, atraumatic. Conjunctivae clear w/o exudates or hemorrhage.       Respiratory: Lung sounds clear in all fields. Chest wall symmetric, nontender.   Abdominal: Soft, nondistended, nontender. Bowel sounds normoactive x 4 quadrants.    Skin: Skin is warm, dry  , no lesions , erythema, swelling, drainage   Extremities: No edema     Mental status: awake, alert, fully participating in exam. The patient is oriented to person, place, disoriented to year (wife states since stroke he has had difficulty).   The patient is able to name objects, follow commands, repeat sentences.    Cranial nerves: right homonomous hemianopia, Pupils equal . There is no visual field deficit to confrontation. Extraocular motion is full with no nystagmus. There is no ptosis. No facial palsy appreciated.      Motor: There is normal bulk and tone.  There is no tremor.  Right hemiparesis: Strength is 4/5 in the right arm with drift that does not hit bed  and 4/5 leg.   Strength is 5/5 in the left arm and leg.    Sensation: RLE sensory neglect    Cerebellar: There is no dysmetria on finger to nose testing.    Gait : deferred      LABS:                        14.8   7.11  )-----------( 200      ( 15 Yazan 2025 06:20 )             45.8    06-15    137  |  103  |  19.2  ----------------------------<  118[H]  4.1   |  19.0[L]  |  0.99    Ca    8.6      15 Yazan 2025 06:20  Phos  3.6     06-15  Mg     2.0     06-15    TPro  7.2  /  Alb  3.8  /  TBili  1.9  /  DBili  x   /  AST  19  /  ALT  10  /  AlkPhos  80  06-14            06-12 Chol 142 LDL -65 - HDL 64 Trig 66    A1C: 5.7        RADIOLOGY & ADDITIONAL STUDIES (independently reviewed unless otherwise noted):  Neuro-Imaging     CT Head No Cont (06.12.25 @ 21:06)   Stable size of left parietal lobe hematoma and unchanged local mass   effect comparing to 6/11/2025.    MR Head w/wo IV Cont (06.12.25 @ 13:05)   IMPRESSION: Stable nonenhancing moderate size left occipital lobe   hematoma with small adjacent acute anterior infarcts.    CT Head No Cont (06.12.25 @ 21:06)   Stable size of left parietal lobe hematoma and unchanged local mass   effect comparing to 6/11/2025.    CT Head No Cont (06.11.25 @ 22:47)   Stable left posterior parietal-occipital paracentral intraparenchymal   hematoma    6/11/25  CT HEAD:  Left posterior parietal-occipital paracentral intraparenchymal hematoma measuring 3.4 x 3.8 x 3.5 cm with surrounding edema and mass effect on the splenium of the corpus callosum and atrium of the left lateral ventricle. Consider further evaluation via pre and postcontrast MR imaging of the brain, provided the patient has no contraindications.    CTA NECK:  1. No evidence of significant stenosis or occlusion.  2. Right lobe of the thyroid gland is prominent appearance measuring 8.4 x 4.8 x 5.1 cm in diameter. The thyroid isthmus is also prominent appearance measuring up to 3 cm in diameter. Several nodules are appreciated within the bilateral lobes of the thyroid gland. Findings include a 1.7 x 1.6 cm nodule with peripheral calcification within the region of the thyroid isthmus. Consider follow-up dedicated thyroid ultrasound assessment on a nonemergent basis for further characterization, as clinically indicated and provided the patient has no contraindications.    CTA HEAD:  1. No large vessel occlusion.  2. Mild stenosis in association with the distal intracranial bilateral vertebral arteries..  3. Hypoplastic left P1 segment with a fetal origin of the left posterior cerebral artery.  4. Left A1 segment is hypoplastic. Bilateral A2 segments appear to fill via the right A1 segment.  5.No aneurysm identified. Tiny aneurysms can be beyond the resolution of CTA technique. No discrete AVM appreciated.  --  Cardiology   TTE W or WO Ultrasound Enhancing Agent (06.13.25 @ 17:14)   CONCLUSIONS:      1. Left ventricular systolic function is normal with an ejection fraction visually estimated at 55 to 60 %. There are no regional wall motion abnormalities seen.   2. There is normal LV mass and normal geometry.   3. The left ventricular diastolic function is indeterminate. Analysis of left ventricular diastolic function and filling pressure is made challenging by the presence of frequent ectopic beats.   4. Normal right ventricular cavity size and borderline reduced right ventricular systolic function.   5. Left atrium is moderately dilated.   6. Trileaflet aortic valve with reduced systolic excursion. There is moderate calcification of the aortic valve leaflets. Moderate aortic stenosis.   7. The peak transaortic velocity is 2.51 m/s, peak transaortic gradient is 25.2 mmHg and mean transaortic gradient is 14.0 mmHg with an LVOT/aortic valve VTI ratio of 0.34. The effective orifice area is estimated at 1.37 cm² by the continuity equation and indexed at 0.59 cm²/m².   8. There is mild posterior calcification of the mitral valve annulus.  --  Ultrasound   US Duplex Venous Lower Ext Complete, Bilateral (06.14.25 @ 12:04)   IMPRESSION:  No evidence of deep venous thrombosis in either lower extremity.             Seen and examined by Stroke team attending/team, assessment/ plan as discussed with stroke team attending/team as noted.     [facility] Stroke Team  Progress Note     HPI:  79yo M with PMHx GERD, HTN, BPH, CAD on baby aspirin, s/p CABG 2012, afib on xarelto s/p ablation, presented to [facility] [place]  with confusion and new onset vision loss. Per patient's wife, Pt returned from a doctor's appointment afternoon in his usual state of health, last known well 12pm 6/11/25. A few hours after around 4pm, Pt started experiencing dizziness, memory loss, forgetfulness, and vision loss. Pt was walking in the wrong rooms and placing items in incorrect places.  Pt was transferred to [facility] for further management after being found to have ICH.     SUBJECTIVE: Noted headache overnight s/p mild relief with Tylenol. No new neurologic complaints. ROS reported negative unless otherwise noted.    acetaminophen     Tablet .. 650 milliGRAM(s) Oral every 6 hours PRN  atorvastatin 40 milliGRAM(s) Oral at bedtime  chlorhexidine 2% Cloths 1 Application(s) Topical daily  dextrose 50% Injectable 25 Gram(s) IV Push once  enoxaparin Injectable 40 milliGRAM(s) SubCutaneous every 24 hours  hydrALAZINE Injectable 10 milliGRAM(s) IV Push every 2 hours PRN  insulin lispro (ADMELOG) corrective regimen sliding scale   SubCutaneous three times a day before meals  labetalol Injectable 10 milliGRAM(s) IV Push every 2 hours PRN  melatonin 3 milliGRAM(s) Oral at bedtime  metoprolol succinate ER 50 milliGRAM(s) Oral daily  oxybutynin XL 15 milliGRAM(s) Oral daily  pantoprazole    Tablet 40 milliGRAM(s) Oral before breakfast  polyethylene glycol 3350 17 Gram(s) Oral daily  sacubitril 24 mG/valsartan 26 mG 1 Tablet(s) Oral two times a day  senna 2 Tablet(s) Oral at bedtime  sodium chloride 0.9%. 1000 milliLiter(s) IV Continuous <Continuous>  vancomycin  IVPB      vancomycin  IVPB 1500 milliGRAM(s) IV Intermittent every 12 hours      PHYSICAL EXAM:   Vital Signs Last 24 Hrs  T(C): 36.8 (15 Yazan 2025 07:47), Max: 37.2 (14 Jun 2025 16:27)  T(F): 98.2 (15 Yazan 2025 07:47), Max: 99 (14 Jun 2025 16:27)  HR: 95 (15 Yazan 2025 08:00) (86 - 98)  BP: 148/101 (15 Yazan 2025 08:09) (116/74 - 166/99)  BP(mean): 116 (15 Yazan 2025 08:09) (82 - 125)  RR: 15 (15 Yazan 2025 08:00) (14 - 19)  SpO2: 99% (15 Yazan 2025 08:00) (95% - 100%)    Parameters below as of 15 Yazan 2025 08:00  Patient On (Oxygen Delivery Method): room air      General: No acute distress.   HEENT: Head normocephalic, atraumatic. Conjunctivae clear w/o exudates or hemorrhage.       Respiratory: Lung sounds clear in all fields. Chest wall symmetric, nontender.   Abdominal: Soft, nondistended, nontender. Bowel sounds normoactive x 4 quadrants.    Skin: Skin is warm, dry, no lesions, erythema, swelling, drainage   Extremities: No edema     Mental status: Awake, alert, fully participating in exam. The patient is oriented to person, place, disoriented to year (wife states since stroke he has had difficulty). Mild aphasia - unable to perform crossed complex commands. Repetition is intact.    Cranial nerves: PERR, Right homonomous hemianopia. Extraocular motion is full with no nystagmus. There is no ptosis. No facial palsy appreciated.      Motor: There is normal bulk and tone. There is no tremor.   - Improving right hemiparesis: no drift in RUE/RLE today   - Strength is 5/5 in the left arm and leg.    Sensation: RLE sensory neglect    Cerebellar: There is no dysmetria on finger to nose testing.    Gait : deferred      LABS:                        14.8   7.11  )-----------( 200      ( 15 Yazan 2025 06:20 )             45.8    06-15    137  |  103  |  19.2  ----------------------------<  118[H]  4.1   |  19.0[L]  |  0.99    Ca    8.6      15 Yazan 2025 06:20  Phos  3.6     06-15  Mg     2.0     06-15    TPro  7.2  /  Alb  3.8  /  TBili  1.9  /  DBili  x   /  AST  19  /  ALT  10  /  AlkPhos  80  06-14            06-12 Chol 142 LDL -65 - HDL 64 Trig 66    A1C: 5.7        RADIOLOGY & ADDITIONAL STUDIES (independently reviewed unless otherwise noted):  Neuro-Imaging     CT Head No Cont (06.12.25 @ 21:06)   Stable size of left parietal lobe hematoma and unchanged local mass   effect comparing to 6/11/2025.    MR Head w/wo IV Cont (06.12.25 @ 13:05)   IMPRESSION: Stable nonenhancing moderate size left occipital lobe   hematoma with small adjacent acute anterior infarcts.    CT Head No Cont (06.12.25 @ 21:06)   Stable size of left parietal lobe hematoma and unchanged local mass   effect comparing to 6/11/2025.    CT Head No Cont (06.11.25 @ 22:47)   Stable left posterior parietal-occipital paracentral intraparenchymal   hematoma    6/11/25  CT HEAD:  Left posterior parietal-occipital paracentral intraparenchymal hematoma measuring 3.4 x 3.8 x 3.5 cm with surrounding edema and mass effect on the splenium of the corpus callosum and atrium of the left lateral ventricle. Consider further evaluation via pre and postcontrast MR imaging of the brain, provided the patient has no contraindications.    CTA NECK:  1. No evidence of significant stenosis or occlusion.  2. Right lobe of the thyroid gland is prominent appearance measuring 8.4 x 4.8 x 5.1 cm in diameter. The thyroid isthmus is also prominent appearance measuring up to 3 cm in diameter. Several nodules are appreciated within the bilateral lobes of the thyroid gland. Findings include a 1.7 x 1.6 cm nodule with peripheral calcification within the region of the thyroid isthmus. Consider follow-up dedicated thyroid ultrasound assessment on a nonemergent basis for further characterization, as clinically indicated and provided the patient has no contraindications.    CTA HEAD:  1. No large vessel occlusion.  2. Mild stenosis in association with the distal intracranial bilateral vertebral arteries..  3. Hypoplastic left P1 segment with a fetal origin of the left posterior cerebral artery.  4. Left A1 segment is hypoplastic. Bilateral A2 segments appear to fill via the right A1 segment.  5.No aneurysm identified. Tiny aneurysms can be beyond the resolution of CTA technique. No discrete AVM appreciated.  --  Cardiology   TTE W or WO Ultrasound Enhancing Agent (06.13.25 @ 17:14)   CONCLUSIONS:      1. Left ventricular systolic function is normal with an ejection fraction visually estimated at 55 to 60 %. There are no regional wall motion abnormalities seen.   2. There is normal LV mass and normal geometry.   3. The left ventricular diastolic function is indeterminate. Analysis of left ventricular diastolic function and filling pressure is made challenging by the presence of frequent ectopic beats.   4. Normal right ventricular cavity size and borderline reduced right ventricular systolic function.   5. Left atrium is moderately dilated.   6. Trileaflet aortic valve with reduced systolic excursion. There is moderate calcification of the aortic valve leaflets. Moderate aortic stenosis.   7. The peak transaortic velocity is 2.51 m/s, peak transaortic gradient is 25.2 mmHg and mean transaortic gradient is 14.0 mmHg with an LVOT/aortic valve VTI ratio of 0.34. The effective orifice area is estimated at 1.37 cm² by the continuity equation and indexed at 0.59 cm²/m².   8. There is mild posterior calcification of the mitral valve annulus.  --  Ultrasound   US Duplex Venous Lower Ext Complete, Bilateral (06.14.25 @ 12:04)   IMPRESSION:  No evidence of deep venous thrombosis in either lower extremity.

## 2025-06-15 NOTE — CONSULT NOTE ADULT - PROBLEM SELECTOR RECOMMENDATION 9
- pt with ICH cardiology is called because DBP is 113, narrow pulse pressure   - narrow pulse pressure can be secondary to hypovolemia vs transitional autonomic response to increased ICP. Currently we do not have concern for cushings triad but would monitor DBP to ensure that there is no progression to widened pulse pressure.   - continue telemetry monitor for bradycardia   - pt is on toprol for rate control, can continue the same dose   - BP is being controlled with entresto, can increase to 49/51mg BID   - PRN IV labetalol and hydralazine   - if secondary agent is needed can switch toprol to coreg   - no need for repeat limited echo   - there is no further inpatient cardiology workup or recommendations, we will sign off.

## 2025-06-15 NOTE — CONSULT NOTE ADULT - SUBJECTIVE AND OBJECTIVE BOX
[facility]                                              CARDIOLOGY AT [facility]                                                   [address], [place] [address]                                             Telephone: [phone]. Fax:[phone]                                                       CARDIOLOGY CONSULTATION NOTE                                                                                             History obtained by: Patient and medical record   Community Cardiologist: Vidal    obtained: Yes [  ] No [ x ]  Available out pt records reviewed: Yes [  ] No [ x ]    Chief complaint:    Patient is a 80y old  Male who presents with a chief complaint of ICH (15 Yazan 2025 08:20)    HPI:  79yo M with PMHx GERD, HTN, BPH, CAD on baby aspirin, s/p CABG , afib on xarelto s/p ablation, presented to [facility] today with confusion and new onset vision loss. Per patient's wife, Pt returned from a doctor's appointment this afternoon in his usual state of health, LKN 12pm. A few hours after around 4pm, Pt started experiencing dizziness, memory loss, forgetfulness, and vision loss. Pt was walking in the wrong rooms and placing items in incorrect places. CTH revealed an acute left parieto-occipital ICH. 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 Pt was given andexxa for xarelto reversal. SBP also noted to be in 200s, started on cardene drip. Pt denies headaches, N/V/D, CP, SOB, neck pain, recent injury to head/neck. NIHSS 5. ICH score 1. MRS 0. Pt was transferred to [facility] for further management.  (2025 23:28)    Review of symptoms:   Cardiac:  No chest pain. No dyspnea. No palpitations.  Respiratory: no cough. No dyspnea  Gastrointestinal: No diarrhea. No abdominal pain. No bleeding.   Neuro: No focal neuro complaints.  All other ROS negative unless otherwise listed above    PHYSICAL EXAM:  Appearance: Comfortable. No acute distress  HEENT:  Atraumatic. Normocephalic.  Normal oral mucosa  Neurologic: A & O x 3, no gross focal deficits.  Cardiovascular: RRR S1 S2, No murmur, no rubs/gallops. No JVD  Respiratory: Lungs clear to auscultation, unlabored   Gastrointestinal:  Soft, Non-tender, + BS  Lower Extremities: 2+ Peripheral Pulses, No clubbing, cyanosis, or edema  Psychiatry: Patient is calm. No agitation.   Skin: warm and dry.    PAST MEDICAL HISTORY  CAD in native artery    Afib    HTN (hypertension)    HLD (hyperlipidemia)    BPH with urinary obstruction    Obesity    Urolithiasis    PAST SURGICAL HISTORY  S/P CABG x 4    SUBSTANCE USE HISTORY  Denies current and previous substance use [  ]   CIGARETTES -   ALCOHOL -   DRUGS -     FAMILY HISTORY:  FH: CVA (cerebrovascular accident) (Father)    CARDIAC SPECIFIC FAMILY HX   No KNOWN family history of Cardiovascular disease, CAD, or sudden death in first degree relatives unless specified below  Family History of Cardiovascular Disease:  [  ]   Coronary Artery Disease in first degree relative:  [  ]   Sudden Cardiac Death in First degree relative: [  ]    HOME MEDICATIONS:  aspirin 81 mg oral capsule: 1 cap(s) orally once a day (2025 15:12)  atorvastatin 40 mg oral tablet: 1 tab(s) orally once a day (2025 15:12)  dexlansoprazole 60 mg oral delayed release capsule: 1 cap(s) orally once a day (2025 15:12)  hydrocodone-acetaminophen 10 mg-325 mg oral tablet: Take 1 to 2 tabs by mouth every 4 to 6 hrs if needed for pain (MDD 12) (2025 15:10)  hydrocodone-homatropine 5 mg-1.5 mg/5 mL oral syrup: 5 milliliter(s) orally 4 times a day (2025 15:12)  metoprolol succinate 50 mg oral tablet, extended release: 1 tab(s) orally once a day (at bedtime) (2025 15:12)  oxybutynin 15 mg/24 hr oral tablet, extended release: 1 tab(s) orally once a day (2025 15:12)  sacubitril-valsartan 24 mg-26 mg oral tablet: 1 tab(s) orally 2 times a day (2025 15:10)  Xarelto 20 mg oral tablet: 1 tab(s) orally once a day (in the evening) (2025 15:12)  zolpidem 5 mg oral tablet: 1 tab(s) orally once a day (at bedtime) as needed for  insomnia (2025 15:12)    CURRENT CARDIAC MEDICATIONS:  hydrALAZINE Injectable 10 milliGRAM(s) IV Push every 2 hours PRN SBP>160  labetalol Injectable 10 milliGRAM(s) IV Push every 2 hours PRN SBP>160  metoprolol succinate ER 50 milliGRAM(s) Oral daily  sacubitril 24 mG/valsartan 26 mG 1 Tablet(s) Oral two times a day    CURRENT OTHER MEDICATIONS:  acetaminophen     Tablet .. 650 milliGRAM(s) Oral every 6 hours PRN Temp greater or equal to 38C (100.4F), Mild Pain (1 - 3)  melatonin 3 milliGRAM(s) Oral at bedtime  pantoprazole    Tablet 40 milliGRAM(s) Oral before breakfast  polyethylene glycol 3350 17 Gram(s) Oral daily  senna 2 Tablet(s) Oral at bedtime  atorvastatin 40 milliGRAM(s) Oral at bedtime  chlorhexidine 2% Cloths 1 Application(s) Topical daily  dextrose 50% Injectable 25 Gram(s) IV Push once, Stop order after: 1 Doses  enoxaparin Injectable 40 milliGRAM(s) SubCutaneous every 24 hours  oxybutynin XL 15 milliGRAM(s) Oral daily  sodium chloride 0.9%. 1000 milliLiter(s) (75 mL/Hr) IV Continuous <Continuous>    ALLERGIES:   Biaxin (Rash; Urticaria; Nausea)  Keflex (Hives)    VITAL SIGNS:  T(C): 36.8 (06-15-25 @ 07:47), Max: 37.2 (25 @ 16:27)  T(F): 98.2 (06-15-25 @ 07:47), Max: 99 (25 @ 16:27)  HR: 86 (06-15-25 @ 12:00) (86 - 98)  BP: 137/117 (06-15-25 @ 12:00) (119/86 - 166/99)  RR: 16 (06-15-25 @ 12:00) (14 - 19)  SpO2: 98% (06-15-25 @ 12:00) (95% - 100%)    INTAKE AND OUTPUT:      @ 07:01  -  06-15 @ 07:00  --------------------------------------------------------  IN: 2650 mL / OUT: 1775 mL / NET: 875 mL    06-15 @ 07:01  -  06-15 @ 12:32  --------------------------------------------------------  IN: 130 mL / OUT: 0 mL / NET: 130 mL      LABS:                        14.8   7.11  )-----------( 200      ( 15 Yazan 2025 06:20 )             45.8     06-15    137  |  103  |  19.2  ----------------------------<  118[H]  4.1   |  19.0[L]  |  0.99    Ca    8.6      15 Yazan 2025 06:20  Phos  3.6     06-15  Mg     2.0     06-15    TPro  7.2  /  Alb  3.8  /  TBili  1.9  /  DBili  x   /  AST  19  /  ALT  10  /  AlkPhos  80  06-14    Urinalysis Basic - ( 15 Yazan 2025 06:20 )    Color: x / Appearance: x / SG: x / pH: x  Gluc: 118 mg/dL / Ketone: x  / Bili: x / Urobili: x   Blood: x / Protein: x / Nitrite: x   Leuk Esterase: x / RBC: x / WBC x   Sq Epi: x / Non Sq Epi: x / Bacteria: x    RADIOLOGY IMAGIN Lead ECG (25 @ 21:26) [Results Available]

## 2025-06-15 NOTE — CONSULT NOTE ADULT - ASSESSMENT
This is a 80 yr old M with PMHx GERD, HTN, BPH, CAD on baby aspirin, s/p CABG 2012, Afib on Xarelto s/p ablation, presented to [facility] today with confusion and new onset vision loss. Per patient's wife, Pt returned from a doctor's appointment this afternoon in his usual state of health, LKN 12pm. A few hours after around 4pm, Pt started experiencing dizziness, memory loss, forgetfulness, and vision loss. Pt was walking in the wrong rooms and placing items in incorrect places. CTH revealed an acute left parieto-occipital ICH. 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 Pt was given andexxa for xarelto reversal. SBP also noted to be in 200s, started on cardene drip. Pt denies headaches, N/V/D, CP, SOB, neck pain, recent injury to head/neck. NIHSS 5. ICH score 1. MRS 0. Pt was transferred to [facility] for further management.

## 2025-06-15 NOTE — PROGRESS NOTE ADULT - ASSESSMENT
INCOMPLETE - VISIT PENDING     ASSESSMENT: 81yo M with PMHx GERD, HTN, BPH, CAD on baby aspirin, s/p CABG 2012, afib on xarelto s/p ablation, presented to [facility]  with confusion and new onset vision loss. Per patient's wife, Pt returned from a doctor's appointment afternoon in his usual state of health, last known well 12pm 6/11/25. A few hours after around 4pm, Pt started experiencing dizziness, memory loss, forgetfulness, and vision loss. Pt was walking in the wrong rooms and placing items in incorrect places.   CT head with left parietal-occipital IPH, CT angiogram head/neck with multifocal intracranial atherosclerotic disease. MR brain demonstrated stable non enhancing moderate left occipital evangelist IPH with adjacent infarctions.     Impression: Left posterior-occipital IPH.  Post hemorrhagic acute adjacent infarcts. Etiology concerning for hypertension with underlying coagulopathy vs CAA. Follow up to resolution to exclude hemorrhagic transformation of ischemic infarction.     NEURO:   -Neurologically without acute change   -Continue close monitoring for neurologic deterioration  in the setting of cerebral edema with mass effect and brain compression.   - Stroke neuro checks q 2  hour  , VS q 2 hours   - Normotension as tolerated avoiding rapid fluctuations and hypotension, overall < 160/90mmHg    -ANTITHROMBOTIC THERAPY: on hold in setting of ICH, further timeline for initiation based on clinical course and serial imaging in coordination with nsx team   -home statin regimen if applicable   -MRI Brain w/ as noted , repeat in 4-6 weeks upon resolution for further evaluation. Unclear if true stroke vs related to underlying ich  -Dysphagia screen: passed, advance diet per protocol   -Physical therapy/OT/Speech eval/treatment.     -DVT ppx: Heparin s.c [] LMWH [] SCD[x]    -maintain adequate hydration    -Na Goal: 135-145   -monitor for si/sx of infection   - LDL/A1C as noted   -Stroke education     -CARDIOVASCULAR: - TTE as noted, cardiac monitoring w/ telemetry for now, further evaluation pending findings of noted workup  , rate control , cardiology consulted for further guidance                                 -HEMATOLOGY: H/H without anemia , Platelets 200, patient should have all age and risk appropriate malignancy screenings with PCP or sooner if clinically suspected   -LE duplex negative for DVT      DVT ppx: Heparin s.c [] LMWH [] SCD[x]     PULMONARY:  protecting airway, saturating well     RENAL: BUN/Cr within limits , monitor urine output, maintain adequate hydration       Na Goal:  135-145     Love: N    ID: febrile, no leukocytosis, monitor for si/sx of infection   -UA negative  -RVP negative  -LE doppler negative  -Pending CXR read   -On IV vanco-trend trough  - ID consult     OTHER:  condition and plan of care d/w patient and wife at bedside. questions and concerns addressed.   -discussed with patient and wife importance of complying with xarelto once restarted. Instructed to take medications as prescribed once a day at the same time at night w/ evening meal. Verbalized understanding with teach back.     DISPOSITION: Acute Rehab once stable and workup is complete        CORE MEASURES     Admission NIHSS:     Tenecteplase : [] YES [x] NO     LDL/HDL/A1C: as noted      Depression Screen- if depression hx and/or present     Statin Therapy: as noted      Dysphagia Screen: [x] PASS [] FAIL     Smoking in the past 12 months [] YES [x] NO       Afib [x] YES [] NO     Stroke Education [x] YES [] NO     Diabetes [] YES [x] NO       Obtain screening lower extremity venous ultrasound in patients who meet 1 or more of the following criteria as patient is high risk for DVT/PE on admission:   [] History of DVT/PE  []Hypercoagulable states (Factor V Leiden, Cancer, OCP, etc. )  []Prolonged immobility (hemiplegia/hemiparesis/post operative or any other extended immobilization)  [] Transferred from outside facility (Rehab or Long term care)  [] Age </= to 50  [x] Stroke    INCOMPLETE - VISIT PENDING     ASSESSMENT: 81yo M with PMHx GERD, HTN, BPH, CAD on baby aspirin, s/p CABG 2012, afib on xarelto s/p ablation, [place] [facility] [place]  with confusion and new onset vision loss. Per patient's wife, Pt returned from a doctor's appointment afternoon in his usual state of health, last known well 12pm 6/11/25. A few hours after around 4pm, Pt started experiencing dizziness, memory loss, forgetfulness, and vision loss. Pt was walking in the wrong rooms and placing items in incorrect places.   CT head with left parietal-occipital IPH, CT angiogram head/neck with multifocal intracranial atherosclerotic disease. MR brain demonstrated stable non enhancing moderate left occipital evangelist IPH with adjacent infarctions.     Impression: Left posterior-occipital IPH.  Post hemorrhagic acute adjacent infarcts. Etiology concerning for hypertension with underlying coagulopathy vs CAA. Follow up to resolution to exclude hemorrhagic transformation of ischemic infarction.     NEURO:   -Neurologically without acute change   -Continue close monitoring for neurologic deterioration  in the setting of cerebral edema with mass effect and brain compression.   - Stroke neuro checks q 2  hour  , VS q 2 hours   - Normotension as tolerated avoiding rapid fluctuations and hypotension, overall < 160/90mmHg    -ANTITHROMBOTIC THERAPY: on hold in setting of ICH, further timeline for initiation based on clinical course and serial imaging in coordination with nsx team   -home statin regimen if applicable   -MRI Brain w/ as noted , repeat in 4-6 weeks upon resolution for further evaluation. Unclear if true stroke vs related to underlying ich  -Dysphagia screen: passed, advance diet per protocol   -Physical therapy/OT/Speech eval/treatment.     -DVT ppx: Heparin s.c [] LMWH [] SCD[x]    -maintain adequate hydration    -Na Goal: 135-145   -monitor for si/sx of infection   - LDL/A1C as noted   -Stroke education     -CARDIOVASCULAR: - TTE as noted, cardiac monitoring w/ telemetry for now, noted 11 bt asx v tach overnights, electrolytes within range, further evaluation pending findings of noted workup  , rate control , cardiology consulted for further guidance                                 -HEMATOLOGY: H/H without anemia , Platelets 200, patient should have all age and risk appropriate malignancy screenings with PCP or sooner if clinically suspected   -LE duplex negative for DVT      DVT ppx: Heparin s.c [] LMWH [] SCD[x]     PULMONARY:  protecting airway, saturating well     RENAL: BUN/Cr within limits , monitor urine output, maintain adequate hydration       Na Goal:  135-145     Love: N    ID: febrile, no leukocytosis, monitor for si/sx of infection   -UA negative  -RVP negative  -LE doppler negative  -Pending CXR read   -On IV vanco-trend trough  - ID consult     OTHER:  condition and plan of care d/w patient and wife at bedside. questions and concerns addressed.   -discussed with patient and wife importance of complying with xarelto once restarted. Instructed to take medications as prescribed once a day at the same time at night w/ evening meal. Verbalized understanding with teach back.     DISPOSITION: Acute Rehab once stable and workup is complete        CORE MEASURES     Admission NIHSS:     Tenecteplase : [] YES [x] NO     LDL/HDL/A1C: as noted      Depression Screen- if depression hx and/or present     Statin Therapy: as noted      Dysphagia Screen: [x] PASS [] FAIL     Smoking in the past 12 months [] YES [x] NO       Afib [x] YES [] NO     Stroke Education [x] YES [] NO     Diabetes [] YES [x] NO       Obtain screening lower extremity venous ultrasound in patients who meet 1 or more of the following criteria as patient is high risk for DVT/PE on admission:   [] History of DVT/PE  []Hypercoagulable states (Factor V Leiden, Cancer, OCP, etc. )  []Prolonged immobility (hemiplegia/hemiparesis/post operative or any other extended immobilization)  [] Transferred from outside facility (Rehab or Long term care)  [] Age </= to 50  [x] Stroke    INCOMPLETE - VISIT PENDING     ASSESSMENT: 81yo M with PMHx GERD, HTN, BPH, CAD on baby aspirin, s/p CABG 2012, afib on xarelto s/p ablation, [facility]  with confusion and new onset vision loss. Per patient's wife, Pt returned from a doctor's appointment afternoon in his usual state of health, last known well 12pm 6/11/25. A few hours after around 4pm, Pt started experiencing dizziness, memory loss, forgetfulness, and vision loss. Pt was walking in the wrong rooms and placing items in incorrect places.   CT head with left parietal-occipital IPH, CT angiogram head/neck with multifocal intracranial atherosclerotic disease. MR brain demonstrated stable non enhancing moderate left occipital evangelist IPH with adjacent infarctions.     Impression: Left posterior-occipital IPH.  Post hemorrhagic acute adjacent infarcts. Etiology concerning for hypertension with underlying coagulopathy vs CAA. Follow up to resolution to exclude hemorrhagic transformation of ischemic infarction.     NEURO:   -Neurologically without acute change   -CT head pending given headache to ensure stability   -Continue close monitoring for neurologic deterioration  in the setting of cerebral edema with mass effect and brain compression.   - Stroke neuro checks q 2  hour  , VS q 2 hours   - Normotension as tolerated avoiding rapid fluctuations and hypotension, overall < 160/90mmHg    -ANTITHROMBOTIC THERAPY: on hold in setting of ICH, further timeline for initiation based on clinical course and serial imaging in coordination with nsx team   -home statin regimen if applicable   -MRI Brain w/ as noted , repeat in 4-6 weeks upon resolution for further evaluation. Unclear if true stroke vs related to underlying ich  -Dysphagia screen: passed, advance diet per protocol   -Physical therapy/OT/Speech eval/treatment.     -DVT ppx: Heparin s.c [] LMWH [] SCD[x]    -maintain adequate hydration    -Na Goal: 135-145   -monitor for si/sx of infection   - LDL/A1C as noted   -Stroke education     -CARDIOVASCULAR: - TTE as noted, cardiac monitoring w/ telemetry for now, noted 11 bt asx v tach overnights, electrolytes within range, further evaluation pending findings of noted workup  , rate control , cardiology consulted for further guidance                                 -HEMATOLOGY: H/H without anemia , Platelets 200, patient should have all age and risk appropriate malignancy screenings with PCP or sooner if clinically suspected   -LE duplex negative for DVT      DVT ppx: Heparin s.c [] LMWH [] SCD[x]     PULMONARY:  protecting airway, saturating well     RENAL: BUN/Cr within limits , monitor urine output, maintain adequate hydration       Na Goal:  135-145     Love: N    ID: febrile, no leukocytosis, monitor for si/sx of infection   -UA negative  -RVP negative  -LE doppler negative  -Pending CXR read   -On IV vanco-trend trough  - ID consult     OTHER:  condition and plan of care d/w patient and wife at bedside. questions and concerns addressed.   -discussed with patient and wife importance of complying with xarelto once restarted. Instructed to take medications as prescribed once a day at the same time at night w/ evening meal. Verbalized understanding with teach back.     DISPOSITION: Acute Rehab once stable and workup is complete        CORE MEASURES     Admission NIHSS:     Tenecteplase : [] YES [x] NO     LDL/HDL/A1C: as noted      Depression Screen- if depression hx and/or present     Statin Therapy: as noted      Dysphagia Screen: [x] PASS [] FAIL     Smoking in the past 12 months [] YES [x] NO       Afib [x] YES [] NO     Stroke Education [x] YES [] NO     Diabetes [] YES [x] NO       Obtain screening lower extremity venous ultrasound in patients who meet 1 or more of the following criteria as patient is high risk for DVT/PE on admission:   [] History of DVT/PE  []Hypercoagulable states (Factor V Leiden, Cancer, OCP, etc. )  []Prolonged immobility (hemiplegia/hemiparesis/post operative or any other extended immobilization)  [] Transferred from outside facility (Rehab or Long term care)  [] Age </= to 50  [x] Stroke      ASSESSMENT: 79yo M with PMHx GERD, HTN, BPH, CAD on baby aspirin, s/p CABG 2012, afib on xarelto s/p ablation, presented to [facility] [place]  with confusion and new onset vision loss. Per patient's wife, Pt returned from a doctor's appointment afternoon in his usual state of health, last known well 12pm 6/11/25. A few hours after around 4pm, Pt started experiencing dizziness, memory loss, forgetfulness, and vision loss. Pt was walking in the wrong rooms and placing items in incorrect places.   CT head with left parietal-occipital IPH, CT angiogram head/neck with multifocal intracranial atherosclerotic disease. MR brain demonstrated stable non enhancing moderate left occipital evangelist IPH with adjacent infarctions.     Impression: Left posterior-occipital IPH.  Post hemorrhagic acute adjacent infarcts. Etiology concerning for hypertension with underlying coagulopathy vs CAA. Follow up to resolution to exclude hemorrhagic transformation of ischemic infarction.     NEURO:   -Neurologically without acute change   -CT head pending given headache to ensure stability   -Continue close monitoring for neurologic deterioration  in the setting of cerebral edema with mass effect and brain compression.   - Stroke neuro checks q 2  hour  , VS q 2 hours   - Normotension as tolerated avoiding rapid fluctuations and hypotension, overall < 160/90mmHg    -ANTITHROMBOTIC THERAPY: on hold in setting of ICH, further timeline for initiation based on clinical course and serial imaging in coordination with nsx team   -home statin regimen if applicable   -MRI Brain w/ as noted , repeat in 4-6 weeks upon resolution for further evaluation. Unclear if true stroke vs related to underlying ich  -Dysphagia screen: passed, advance diet per protocol   -Physical therapy/OT/Speech eval/treatment.     -DVT ppx: Heparin s.c [] LMWH [] SCD[x]    -maintain adequate hydration    -Na Goal: 135-145   -monitor for si/sx of infection   - LDL/A1C as noted   -Stroke education     -CARDIOVASCULAR: - TTE as noted, cardiac monitoring w/ telemetry for now, noted 11 bt asx v tach overnights, electrolytes within range, further evaluation pending findings of noted workup  , rate control , cardiology consulted for further guidance                                 -HEMATOLOGY: H/H without anemia , Platelets 200, patient should have all age and risk appropriate malignancy screenings with PCP or sooner if clinically suspected   -LE duplex negative for DVT      DVT ppx: Heparin s.c [] LMWH [] SCD[x]     PULMONARY:  protecting airway, saturating well     RENAL: BUN/Cr within limits , monitor urine output, maintain adequate hydration       Na Goal:  135-145     Love: N    ID: currently afebrile, no leukocytosis, monitor for si/sx of infection   -UA negative nitrite, trace LE, many bacteria, not obtained via straight cath   -RVP negative  -LE doppler negative  -CXR read with concern for pneumonia , resume abx   -Blood cx NGTD x 2   - ID consult for further guidance re: optimal regimen.     OTHER:  condition and plan of care d/w patient and wife at bedside. questions and concerns addressed.   -discussed with patient and wife importance of complying with xarelto once restarted. Instructed to take medications as prescribed once a day at the same time at night w/ evening meal. Verbalized understanding with teach back.     DISPOSITION: Acute Rehab once stable and workup is complete        CORE MEASURES     Admission NIHSS:     Tenecteplase : [] YES [x] NO     LDL/HDL/A1C: as noted      Depression Screen- if depression hx and/or present     Statin Therapy: as noted      Dysphagia Screen: [x] PASS [] FAIL     Smoking in the past 12 months [] YES [x] NO       Afib [x] YES [] NO     Stroke Education [x] YES [] NO     Diabetes [] YES [x] NO       Obtain screening lower extremity venous ultrasound in patients who meet 1 or more of the following criteria as patient is high risk for DVT/PE on admission:   [] History of DVT/PE  []Hypercoagulable states (Factor V Leiden, Cancer, OCP, etc. )  []Prolonged immobility (hemiplegia/hemiparesis/post operative or any other extended immobilization)  [] Transferred from outside facility (Rehab or Long term care)  [] Age </= to 50  [x] Stroke      ASSESSMENT: 79yo M with PMHx GERD, HTN, BPH, CAD on baby aspirin, s/p CABG 2012, afib on xarelto s/p ablation, [place] [facility] [place] new [place] vision [place]. Per patient's wife, Pt returned from a doctor's appointment afternoon in his usual state of health, last known well 12pm 6/11/25. A few hours after around 4pm, Pt started experiencing dizziness, memory loss, forgetfulness, and vision loss. Pt was walking in the wrong rooms and placing items in incorrect places.  CT head with left parietal-occipital IPH, CT angiogram head/neck with multifocal intracranial atherosclerotic disease. MR brain demonstrated stable non enhancing moderate left occipital evangelist IPH with adjacent infarctions.     Impression: Left posterior-occipital IPH. Post hemorrhagic acute adjacent infarcts. Etiology concerning for hypertension with underlying coagulopathy vs CAA. Follow up to resolution to exclude hemorrhagic transformation of ischemic infarction.     NEURO:   -Neurologically without acute change, improvement in right hemiparesis noted today   -CT head stable 6/15  -Continue close monitoring for neurologic deterioration  in the setting of cerebral edema with mass effect and brain compression.   -Stroke neuro checks q 2  hour, VS q 2 hours   -Normotension as tolerated avoiding rapid fluctuations and hypotension, overall < 160/90mmHg    -ANTITHROMBOTIC THERAPY: AC on hold in setting of ICH, further timeline for initiation based on clinical course and serial imaging in coordination with nsx team   -home statin regimen if applicable   -MRI Brain w/ as noted, repeat in 4-6 weeks upon resolution for further evaluation. Unclear if true stroke vs related to underlying ICH  -Dysphagia screen: passed, advance diet per protocol   -Physical therapy/OT/Speech eval/treatment.    -DVT ppx: Heparin s.c [] LMWH [x] SCD[x]    -maintain adequate hydration    -Na Goal: 135-145   -monitor for si/sx of infection   - LDL/A1C as noted   -Stroke education     -CARDIOVASCULAR: - TTE as noted, cardiac monitoring w/ telemetry for now, noted 11 bt asx v tach overnights, electrolytes within range, further evaluation pending findings of noted workup  , rate control , cardiology consulted for further guidance                                 -HEMATOLOGY: H/H without anemia , Platelets 200, patient should have all age and risk appropriate malignancy screenings with PCP or sooner if clinically suspected   -LE duplex negative for DVT      DVT ppx: Heparin s.c [x] LMWH [] SCD[x]     PULMONARY:  protecting airway, saturating well     RENAL: BUN/Cr within limits , monitor urine output, maintain adequate hydration       Na Goal:  135-145     Love: N    ID: currently afebrile, no leukocytosis, monitor for si/sx of infection   -UA negative nitrite, trace LE, many bacteria, not obtained via straight cath   -RVP negative  -LE doppler negative  -CXR read with concern for pneumonia, resume abx   -Blood cx NGTD x 2   -ID consult for further guidance re: optimal regimen.     OTHER:  condition and plan of care d/w patient and wife at bedside. questions and concerns addressed.   -discussed with patient and wife importance of complying with xarelto once restarted. Instructed to take medications as prescribed once a day at the same time at night w/ evening meal. Verbalized understanding with teach back.     DISPOSITION: Acute Rehab once stable and workup is complete        CORE MEASURES     Admission NIHSS: 7     Tenecteplase : [] YES [x] NO     LDL/HDL/A1C: as noted      Depression Screen- if depression hx and/or present     Statin Therapy: as noted      Dysphagia Screen: [x] PASS [] FAIL     Smoking in the past 12 months [] YES [x] NO       Afib [x] YES [] NO     Stroke Education [x] YES [] NO     Diabetes [] YES [x] NO       Obtain screening lower extremity venous ultrasound in patients who meet 1 or more of the following criteria as patient is high risk for DVT/PE on admission:   [] History of DVT/PE  []Hypercoagulable states (Factor V Leiden, Cancer, OCP, etc. )  []Prolonged immobility (hemiplegia/hemiparesis/post operative or any other extended immobilization)  [] Transferred from outside facility (Rehab or Long term care)  [] Age </= to 50  [x] Stroke

## 2025-06-15 NOTE — SBIRT NOTE ADULT - NSSBIRTBRIEFINTDET_GEN_A_CORE
Patient reports he has 1 drink daily with dinner. Patient reports no issues/concerns with intake at this time.

## 2025-06-15 NOTE — CONSULT NOTE ADULT - ASSESSMENT
Patient seen and examined     FULL CONSULT TO FOLLOW     ID consulted for PNA     Patient was febrile on 6/13 - 6/14   He reports family members with a cold/fever    He is asymptomatic from a respiratory perspective  WBC is normal   CXR with possible left opacity   RVP on 6/13 was negative, but this does not rule out the possibility of a viral illness     Doubt bacterial PNA   Would monitor off antibiotics     Please note that patient DENIES allergy to Keflex (removed from chart) and "allergy" to Biaxin is just the most common side effect with this drug: GI upset.     d/w Stroke PA and RN  d/w patient and wife      80M former smoker with Afib, CAD s/p CAGB, HTN, BPH was [place]11 [place] [facility] [place] parieto-occipital ICH.     ID consulted for PNA     Patient was febrile on 6/13 - 6/14   He reports family members with a cold/fever    He is asymptomatic from a respiratory perspective  WBC is normal   CXR with possible left opacity   RVP on 6/13 was negative, but this does not rule out the possibility of a viral illness   6/14 BCx ngtd  MRSA nasal swab negative     Doubt bacterial PNA   Would monitor off antibiotics     If fever recurs, repeat RVP and get CT Chest     Please note that patient DENIES allergy to Keflex (removed from chart) and "allergy" to Biaxin is just the most common side effect with this drug: GI upset.     d/w Stroke PA and RN  d/w patient and wife

## 2025-06-15 NOTE — CONSULT NOTE ADULT - NS ATTEND AMEND GEN_ALL_CORE FT
80 yr old M with PMHx GERD, HTN, BPH, CAD on baby aspirin, s/p CABG 2012, Afib on Xarelto s/p ablation, [place] [facility] [place] and new onset vision loss. Per patient's wife, Pt returned from a doctor's appointment this afternoon in his usual state of health, LKN 12pm. A few hours after around 4pm, Pt started experiencing dizziness, memory loss, forgetfulness, and vision loss. Pt was walking in the wrong rooms and placing items in incorrect places. CTH revealed an acute left parieto-occipital ICH. 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 Pt was given andexxa for xarelto reversal. SBP also noted to be in 200s, started on cardene drip. Pt denies headaches, N/V/D, CP, SOB, neck pain, recent injury to head/neck. NIHSS 5. ICH score 1. MRS 0. Pt was transferred to [facility] for further management.       increase entresto dose  consider adjustment of toprol xl to coreg 6.25mg BID if further titration necessary  no need for repeat limited echo    we will follow peripherally

## 2025-06-16 ENCOUNTER — TRANSCRIPTION ENCOUNTER (OUTPATIENT)
Age: 80
End: 2025-06-16

## 2025-06-16 VITALS
HEART RATE: 100 BPM | SYSTOLIC BLOOD PRESSURE: 137 MMHG | DIASTOLIC BLOOD PRESSURE: 89 MMHG | OXYGEN SATURATION: 100 % | RESPIRATION RATE: 18 BRPM

## 2025-06-16 PROCEDURE — 87040 BLOOD CULTURE FOR BACTERIA: CPT

## 2025-06-16 PROCEDURE — 83735 ASSAY OF MAGNESIUM: CPT

## 2025-06-16 PROCEDURE — 93970 EXTREMITY STUDY: CPT

## 2025-06-16 PROCEDURE — 95819 EEG AWAKE AND ASLEEP: CPT

## 2025-06-16 PROCEDURE — 80061 LIPID PANEL: CPT

## 2025-06-16 PROCEDURE — 99232 SBSQ HOSP IP/OBS MODERATE 35: CPT

## 2025-06-16 PROCEDURE — 36415 COLL VENOUS BLD VENIPUNCTURE: CPT

## 2025-06-16 PROCEDURE — 70553 MRI BRAIN STEM W/O & W/DYE: CPT

## 2025-06-16 PROCEDURE — 82962 GLUCOSE BLOOD TEST: CPT

## 2025-06-16 PROCEDURE — 75572 CT HRT W/3D IMAGE: CPT

## 2025-06-16 PROCEDURE — 87640 STAPH A DNA AMP PROBE: CPT

## 2025-06-16 PROCEDURE — 71045 X-RAY EXAM CHEST 1 VIEW: CPT

## 2025-06-16 PROCEDURE — 99285 EMERGENCY DEPT VISIT HI MDM: CPT

## 2025-06-16 PROCEDURE — 0225U NFCT DS DNA&RNA 21 SARSCOV2: CPT

## 2025-06-16 PROCEDURE — 85610 PROTHROMBIN TIME: CPT

## 2025-06-16 PROCEDURE — 87641 MR-STAPH DNA AMP PROBE: CPT

## 2025-06-16 PROCEDURE — 81001 URINALYSIS AUTO W/SCOPE: CPT

## 2025-06-16 PROCEDURE — 75572 CT HRT W/3D IMAGE: CPT | Mod: 26

## 2025-06-16 PROCEDURE — 85027 COMPLETE CBC AUTOMATED: CPT

## 2025-06-16 PROCEDURE — C8929: CPT

## 2025-06-16 PROCEDURE — 93005 ELECTROCARDIOGRAM TRACING: CPT

## 2025-06-16 PROCEDURE — 97163 PT EVAL HIGH COMPLEX 45 MIN: CPT

## 2025-06-16 PROCEDURE — 83036 HEMOGLOBIN GLYCOSYLATED A1C: CPT

## 2025-06-16 PROCEDURE — 84100 ASSAY OF PHOSPHORUS: CPT

## 2025-06-16 PROCEDURE — 99223 1ST HOSP IP/OBS HIGH 75: CPT

## 2025-06-16 PROCEDURE — 96374 THER/PROPH/DIAG INJ IV PUSH: CPT

## 2025-06-16 PROCEDURE — 70450 CT HEAD/BRAIN W/O DYE: CPT

## 2025-06-16 PROCEDURE — 97167 OT EVAL HIGH COMPLEX 60 MIN: CPT

## 2025-06-16 PROCEDURE — 80053 COMPREHEN METABOLIC PANEL: CPT

## 2025-06-16 PROCEDURE — 80048 BASIC METABOLIC PNL TOTAL CA: CPT

## 2025-06-16 PROCEDURE — 85730 THROMBOPLASTIN TIME PARTIAL: CPT

## 2025-06-16 RX ORDER — MELATONIN 5 MG
1 TABLET ORAL
Qty: 0 | Refills: 0 | DISCHARGE
Start: 2025-06-16

## 2025-06-16 RX ORDER — HYDROCODONE/HOMATROPINE
5 SYRUP ORAL
Refills: 0 | DISCHARGE

## 2025-06-16 RX ORDER — CARVEDILOL 3.12 MG/1
1 TABLET, FILM COATED ORAL
Qty: 0 | Refills: 0 | DISCHARGE
Start: 2025-06-16

## 2025-06-16 RX ORDER — SENNA 187 MG
2 TABLET ORAL
Qty: 0 | Refills: 0 | DISCHARGE
Start: 2025-06-16

## 2025-06-16 RX ORDER — METOPROLOL SUCCINATE 50 MG/1
1 TABLET, EXTENDED RELEASE ORAL
Refills: 0 | DISCHARGE

## 2025-06-16 RX ORDER — ASPIRIN 325 MG
1 TABLET ORAL
Refills: 0 | DISCHARGE

## 2025-06-16 RX ORDER — ENOXAPARIN SODIUM 100 MG/ML
40 INJECTION SUBCUTANEOUS
Qty: 0 | Refills: 0 | DISCHARGE
Start: 2025-06-16

## 2025-06-16 RX ORDER — POLYETHYLENE GLYCOL 3350 17 G/17G
17 POWDER, FOR SOLUTION ORAL
Qty: 0 | Refills: 0 | DISCHARGE
Start: 2025-06-16

## 2025-06-16 RX ORDER — HYDROCODONE BITARTRATE AND ACETAMINOPHEN 5; 325 MG/1; MG/1
0 TABLET ORAL
Refills: 0 | DISCHARGE

## 2025-06-16 RX ORDER — MELATONIN 5 MG
5 TABLET ORAL AT BEDTIME
Refills: 0 | Status: DISCONTINUED | OUTPATIENT
Start: 2025-06-16 | End: 2025-06-16

## 2025-06-16 RX ADMIN — ENOXAPARIN SODIUM 40 MILLIGRAM(S): 100 INJECTION SUBCUTANEOUS at 17:11

## 2025-06-16 RX ADMIN — Medication 1 APPLICATION(S): at 13:59

## 2025-06-16 RX ADMIN — Medication 40 MILLIGRAM(S): at 05:16

## 2025-06-16 RX ADMIN — CARVEDILOL 6.25 MILLIGRAM(S): 3.12 TABLET, FILM COATED ORAL at 17:11

## 2025-06-16 RX ADMIN — SACUBITRIL AND VALSARTAN 1 TABLET(S): 49; 51 TABLET, FILM COATED ORAL at 05:15

## 2025-06-16 RX ADMIN — CARVEDILOL 6.25 MILLIGRAM(S): 3.12 TABLET, FILM COATED ORAL at 05:15

## 2025-06-16 RX ADMIN — SACUBITRIL AND VALSARTAN 1 TABLET(S): 49; 51 TABLET, FILM COATED ORAL at 17:11

## 2025-06-16 RX ADMIN — POLYETHYLENE GLYCOL 3350 17 GRAM(S): 17 POWDER, FOR SOLUTION ORAL at 13:58

## 2025-06-16 RX ADMIN — Medication 50 MILLILITER(S): at 05:16

## 2025-06-16 RX ADMIN — OXYBUTYNIN CHLORIDE 15 MILLIGRAM(S): 5 TABLET, FILM COATED, EXTENDED RELEASE ORAL at 13:59

## 2025-06-16 NOTE — CONSULT NOTE ADULT - CONSULT REQUESTED DATE/TIME
11-Jun-2025 09:30
15-Yazan-2025 14:19
16-Jun-2025 07:17
15-Yazan-2025 12:26
16-Jun-2025 11:26
12-Jun-2025 15:23

## 2025-06-16 NOTE — DISCHARGE NOTE PROVIDER - NSDCCPCAREPLAN_GEN_ALL_CORE_FT
PRINCIPAL DISCHARGE DIAGNOSIS  Diagnosis: ICH (intracerebral hemorrhage)  Assessment and Plan of Treatment: You were admitted to the hospital because you had an ischemic stroke/intracranial hemorrhage.   You have these risks factors of strokes: (delete what does not apply)  -atrial fibrillation  -high blood pressure (Hypertension)  For secondary stroke prevention please take your medications as prescribed. If you run low on your medication, please contact your doctor before you run out.  You will follow up outpatient with the vascular neurology team, the office will call you within 3 days of discharge to schedule an appointment.  If you do not hear from the office please call the phone number provided.    Please see all regularly scheduled providers as prior to your admission. Please see your primary care doctor within one week of discharge.  In addition discuss with your primary care provider regarding candidacy for routine malignancy screenings.   Adjustments to your regular tasks may be difficult after you've had a stroke, but you can utilize  new ways/assistance as needed to manage your daily activities based on the recommendations of your physical, occupational, speech and language team if applicable.    Call 911 if you or someone you know experiences the following symptoms of stroke (can be remembered by BE FAST):  •Balance: Dizziness, loss of balance, or a sense of falling  •Eyes: Sudden double vision, loss of vision, or blurred vision  •Face: drooping of one side of the face  •Arm: arm weakness or drifting  •Speech:  Sudden trouble talking or slurred speech, trouble understanding others  •Time: Time to call for an ambulance fast!         SECONDARY DISCHARGE DIAGNOSES  Diagnosis: Chronic atrial fibrillation  Assessment and Plan of Treatment: Please take your medications as prescribed- do not skip doses and follow with your cardiologist.    Diagnosis: Hypertension  Assessment and Plan of Treatment: Maintain a journal record of your blood pressure twice a day to show your primary care/cardiologist .    Diagnosis: CAD (coronary artery disease)  Assessment and Plan of Treatment:

## 2025-06-16 NOTE — CONSULT NOTE ADULT - SUBJECTIVE AND OBJECTIVE BOX
80yM [place] [facility] [place].  CTH showed an acute left parieto-occipital ICH. Patient was given andexxa for xarelto reversal. SBP was in 200s, started on cardene drip.     Imaging Reviewed Today:  CT HEAD 6/11 -  Left posterior parietal-occipital paracentral intraparenchymal hematoma measuring 3.4 x 3.8 x 3.5 cm with surrounding edema and mass effect on the splenium of the corpus callosum and atrium of the left lateral ventricle. Consider further evaluation via pre and postcontrast MR imaging of the brain, provided the patient has no contraindications.    CTA NECK  6/11 - 1. No evidence of significant stenosis or occlusion. 2. Right lobe of the thyroid gland is prominent appearance measuring 8.4 x 4.8 x 5.1 cm in diameter. The thyroid isthmus is also prominent appearance measuring up to 3 cm in diameter. Several nodules are appreciated within the bilateral lobes of the thyroid gland. Findings include a 1.7 x 1.6 cm nodule with peripheral calcification within the region of the thyroid isthmus. Consider follow-up dedicated thyroid ultrasound assessment on a nonemergent basis for further characterization, as clinically indicated and provided the patient has no contraindications.    CTA HEAD  6/11 - 1. No large vessel occlusion. 2. Mild stenosis in association with the distal intracranial bilateral vertebral arteries..3. Hypoplastic left P1 segment with a fetal origin of the left posterior cerebral artery.4. Left A1 segment is hypoplastic. Bilateral A2 segments appear to fill via the right A1 segment.5.No aneurysm identified. Tiny aneurysms can be beyond the resolution of CTA technique. No discrete AVM appreciated.    CT Head 6/12 - Stable size of left parietal lobe hematoma and unchanged local mass effect comparing to 6/11/2025.    MR Head 6/12 - Stable nonenhancing moderate size left occipital lobe hematoma with small adjacent acute anterior infarcts.  ----------------------------  Patient sleeping in chair, wife at bedside.  Does not sleep at baseline, let alone in different places.  Reports no pain.  Reports he walked in the hallway with nursing staff.  Patient and wife would like to go home.       VITALS  T(C): 36.8 (06-16-25 @ 07:08), Max: 37.3 (06-15-25 @ 19:22)  HR: 87 (06-16-25 @ 06:00) (79 - 101)  BP: 132/82 (06-16-25 @ 06:00) (118/86 - 171/106)  RR: 16 (06-16-25 @ 06:00) (15 - 19)  SpO2: 99% (06-16-25 @ 06:00) (94% - 100%)  Wt(kg): --    PAST MEDICAL & SURGICAL HISTORY  CAD in native artery    Afib    HTN (hypertension)    HLD (hyperlipidemia)    BPH with urinary obstruction    Obesity    Urolithiasis    S/P CABG x 4         RECENT LABS REVIEWED    CBC Full  -  ( 15 Yazan 2025 06:20 )  WBC Count : 7.11 K/uL  RBC Count : 5.88 M/uL  Hemoglobin : 14.8 g/dL  Hematocrit : 45.8 %  Platelet Count - Automated : 200 K/uL  Mean Cell Volume : 77.9 fl  Mean Cell Hemoglobin : 25.2 pg  Mean Cell Hemoglobin Concentration : 32.3 g/dL  Auto Neutrophil # : x  Auto Lymphocyte # : x  Auto Monocyte # : x  Auto Eosinophil # : x  Auto Basophil # : x  Auto Neutrophil % : x  Auto Lymphocyte % : x  Auto Monocyte % : x  Auto Eosinophil % : x  Auto Basophil % : x    06-15    137  |  103  |  19.2  ----------------------------<  118[H]  4.1   |  19.0[L]  |  0.99    Ca    8.6      15 Yazan 2025 06:20  Phos  3.6     06-15  Mg     2.0     06-15      Urinalysis Basic - ( 15 Yazan 2025 06:20 )    Color: x / Appearance: x / SG: x / pH: x  Gluc: 118 mg/dL / Ketone: x  / Bili: x / Urobili: x   Blood: x / Protein: x / Nitrite: x   Leuk Esterase: x / RBC: x / WBC x   Sq Epi: x / Non Sq Epi: x / Bacteria: x        ALLERGIES  Biaxin (Rash; Urticaria; Nausea)      MEDICATIONS   acetaminophen     Tablet .. 650 milliGRAM(s) Oral every 6 hours PRN  atorvastatin 40 milliGRAM(s) Oral at bedtime  carvedilol 6.25 milliGRAM(s) Oral every 12 hours  chlorhexidine 2% Cloths 1 Application(s) Topical daily  dextrose 50% Injectable 25 Gram(s) IV Push once  enoxaparin Injectable 40 milliGRAM(s) SubCutaneous every 24 hours  hydrALAZINE Injectable 10 milliGRAM(s) IV Push every 2 hours PRN  labetalol Injectable 10 milliGRAM(s) IV Push every 2 hours PRN  melatonin 5 milliGRAM(s) Oral at bedtime  oxybutynin XL 15 milliGRAM(s) Oral daily  pantoprazole    Tablet 40 milliGRAM(s) Oral before breakfast  polyethylene glycol 3350 17 Gram(s) Oral daily  sacubitril 24 mG/valsartan 26 mG 1 Tablet(s) Oral two times a day  senna 2 Tablet(s) Oral at bedtime  sodium chloride 0.9%. 1000 milliLiter(s) IV Continuous <Continuous>      ----------------------------------------------------------------------------------------  FUNCTIONAL HISTORY  Lives with family, 5 DAGMAR  Independent    FUNCTIONAL STATUS/PROGRESS  6/13 PT  Bed Mobility: Rolling/Turning:     · Level of [place]	minimum assist (75% patients effort)  · Physical Assist/Nonphysical Assist	1 person assist    Bed Mobility: Sit to Supine:     · Level of Jeff [place]	minimum assist (75% patients effort)  · Physical Assist/Nonphysical Assist	1 person assist    Bed Mobility: Supine to Sit:     · Level of Jeff [place]	minimum assist (75% patients effort)  · Physical Assist/Nonphysical Assist	1 person assist    Bed Mobility Analysis:     · Bed Mobility Limitations	decreased ability to use legs for bridging/pushing; decreased ability to use arms for pushing/pulling; impaired ability to control trunk for mobility  · Impairments Contributing to Impaired Bed Mobility	impaired balance; impaired coordination; decreased strength; impaired postural control; pain; impaired motor control; cognition    Transfer: Sit to Stand:     · Level of Jeff [place]	minimum assist (75% patients effort)  · Physical Assist/Nonphysical Assist	1 person + 1 person to manage equipment  · Weight-Bearing Restrictions	weight-bearing as tolerated  · Assistive Device	hand held assist    Transfer: Stand to Sit:     · Level of Jeff Davis	minimum assist (75% patients effort)  · Physical Assist/Nonphysical Assist	1 person + 1 person to manage equipment  · Weight-Bearing Restrictions	weight-bearing as tolerated  · Assistive Device	hand held assist    Sit/Stand Transfer Safety Analysis:     · Transfer Safety Concerns Noted	decreased safety awareness; losing balance; decreased sequencing ability; decreased weight-shifting ability  · Impairments Contributing to Impaired Transfers	impaired balance; cognition; impaired coordination; impaired motor control; impaired postural control; decreased strength; pain    Gait Skills:     · Level of Jeff Davis	moderate assist (50% patients effort)  · Physical Assist/Nonphysical Assist	2 person assist  · Weight-Bearing Restrictions	weight-bearing as tolerated  · Assistive Device	bilateral hand held assist  · Gait Distance	10 feet    6/13 OT  Bathing Training:     · Level of Jeff Davis	maximum assist (25% patients effort)    Upper Body Dressing Training:     · Level of Jeff Davis	maximum assist (25% patients effort)    Lower Body Dressing Training:     · Level of Jeff Davis	maximum assist (25% patients effort)    Toilet Hygiene Training:     · Level of Jeff Davis	maximum assist (25% patients effort)    Grooming Training:     · Level of Jeff Davis	maximum assist (25% patients effort)    ----------------------------------------------------------------------------------------  PHYSICAL EXAM  Constitutional - NAD, Appears Comfortable  HEENT - NCAT, EOMI  Neck - Supple, No limited ROM  Chest - Breathing comfortably, No wheezing  Cardiovascular - S1S2   Abdomen - Soft   Extremities - No C/C/E, No calf tenderness   Neurologic Exam -                    Cognitive - AAO to self, place, date, year, situation     Communication - Mild delayed processing deficits, Fluent     Cranial Nerves - Left head turn preference     FUNCTIONAL MOTOR EXAM - Slight left hemiparesis      Sensory - Intact to LT   Psychiatric - Mood stable, Fatigued  ----------------------------------------------------------------------------------------  ASSESSMENT/PLAN  80yMale with functional deficits after an acute CVA  Left parietal/occipital IPH/HTN - Coreg, Hydralazine/Labetalol IV  Pain - Tylenol  DVT PPX - SCDs, Lovenox  Rehab/Impaired mobility and function - Patient continues to require hospitalization for the above diagnoses and ongoing active management of comorbid complications that are substantially posing a threat to bodily function, functional ability and quality of life.     RECOMMEND - OOB daily      When medically optimized, based on the patient's diagnosis, current functional status and potential for progress, expect patient to achieve functional goals for DC HOME.

## 2025-06-16 NOTE — PROGRESS NOTE ADULT - ASSESSMENT
INCOMPLETE_ VISIT PENDING   ASSESSMENT: 79yo M with PMHx GERD, HTN, BPH, CAD on baby aspirin, s/p CABG 2012, afib on xarelto s/p ablation, presented to [facility] [place]  with confusion and new onset vision loss. Per patient's wife, Pt returned from a doctor's appointment afternoon in his usual state of health, last known well 12pm 6/11/25. A few hours after around 4pm, Pt started experiencing dizziness, memory loss, forgetfulness, and vision loss. Pt was walking in the wrong rooms and placing items in incorrect places.  CT head with left parietal-occipital IPH, CT angiogram head/neck with multifocal intracranial atherosclerotic disease. MR brain demonstrated stable non enhancing moderate left occipital evangelist IPH with adjacent infarctions.     Impression: Left posterior-occipital IPH. Post hemorrhagic acute adjacent infarcts possibly sequela of post hemorrhagic event, though can not entirely exclude embolism. Etiology concerning for hypertension with underlying coagulopathy vs CAA. Follow up to resolution to exclude hemorrhagic transformation of ischemic infarction in the setting of hypertension.     NEURO:   -Neurologically without acute change, improvement in right hemiparesis noted today   -CT head stable 6/15  -Continue close monitoring for neurologic deterioration  in the setting of cerebral edema with mass effect and brain compression.   -Stroke neuro checks q 2  hour, VS q 2 hours   -Normotension as tolerated avoiding rapid fluctuations and hypotension, overall < 160/90mmHg    -ANTITHROMBOTIC THERAPY: AC on hold in setting of ICH, further timeline for initiation based on clinical course and serial imaging in coordination with nsx team   -home statin regimen if applicable   -MRI Brain w/ as noted, repeat in 4-6 weeks upon resolution for further evaluation. Unclear if true stroke vs related to underlying ICH  -Dysphagia screen: passed, advance diet per protocol   -Physical therapy/OT/Speech eval/treatment.    -DVT ppx: Heparin s.c [] LMWH [x] SCD[x]    -maintain adequate hydration    -Na Goal: 135-145   -monitor for si/sx of infection   - LDL/A1C as noted   -Stroke education     -CARDIOVASCULAR: - TTE as noted, cardiac monitoring w/ telemetry for now, noted 11 bt asx v tach overnights, electrolytes within range, further evaluation pending findings of noted workup  , rate control , cardiology consulted for further guidance                                 -HEMATOLOGY: H/H without anemia , Platelets 200, patient should have all age and risk appropriate malignancy screenings with PCP or sooner if clinically suspected   -LE duplex negative for DVT      DVT ppx: Heparin s.c [x] LMWH [] SCD[x]     PULMONARY:  protecting airway, saturating well     RENAL: BUN/Cr within limits , monitor urine output, maintain adequate hydration       Na Goal:  135-145     Love: N    ID: currently afebrile, no leukocytosis, monitor for si/sx of infection   -UA negative nitrite, trace LE, many bacteria, not obtained via straight cath   -RVP negative  -LE doppler negative  -CXR read with concern for pneumonia, resume abx   -Blood cx NGTD x 2   -ID consult for further guidance re: optimal regimen.     OTHER:  condition and plan of care d/w patient and wife at bedside. questions and concerns addressed.   -discussed with patient and wife importance of complying with xarelto once restarted. Instructed to take medications as prescribed once a day at the same time at night w/ evening meal. Verbalized understanding with teach back.     DISPOSITION: Acute Rehab once stable and workup is complete        CORE MEASURES     Admission NIHSS: 7     Tenecteplase : [] YES [x] NO     LDL/HDL/A1C: as noted      Depression Screen- if depression hx and/or present     Statin Therapy: as noted      Dysphagia Screen: [x] PASS [] FAIL     Smoking in the past 12 months [] YES [x] NO       Afib [x] YES [] NO     Stroke Education [x] YES [] NO     Diabetes [] YES [x] NO       Obtain screening lower extremity venous ultrasound in patients who meet 1 or more of the following criteria as patient is high risk for DVT/PE on admission:   [] History of DVT/PE  []Hypercoagulable states (Factor V Leiden, Cancer, OCP, etc. )  []Prolonged immobility (hemiplegia/hemiparesis/post operative or any other extended immobilization)  [] Transferred from outside facility (Rehab or Long term care)  [] Age </= to 50  [x] Stroke       ASSESSMENT: 79yo M with PMHx GERD, HTN, BPH, CAD on baby aspirin, s/p CABG 2012, afib on xarelto s/p ablation, presented to [facility] [place]  with confusion and new onset vision loss. Per patient's wife, Pt returned from a doctor's appointment afternoon in his usual state of health, last known well 12pm 6/11/25. A few hours after around 4pm, Pt started experiencing dizziness, memory loss, forgetfulness, and vision loss. Pt was walking in the wrong rooms and placing items in incorrect places.  CT head with left parietal-occipital IPH, CT angiogram head/neck with multifocal intracranial atherosclerotic disease. MR brain demonstrated stable non enhancing moderate left occipital evangelist IPH with adjacent infarctions.     Impression: Left posterior-occipital IPH. Post hemorrhagic acute adjacent infarcts possibly sequela of post hemorrhagic event, though can not entirely exclude embolism. Etiology concerning for hypertension with underlying coagulopathy vs CAA. Follow up to resolution to exclude hemorrhagic transformation of ischemic infarction in the setting of hypertension.     NEURO:   -Neurologically without acute change, improvement in right hemiparesis noted today   -CT head stable 6/15/25  -stable  cerebral edema with mass effect and brain compression. Avoid hyponatremia.  -Stroke neuro checks q 2  hour, VS q 2 hours , can transition to q 4 hours    -Normotension as tolerated avoiding rapid fluctuations and hypotension, overall < 160/90mmHg    -ANTITHROMBOTIC THERAPY: AC on hold in setting of ICH and increased risk of bleeding, further timeline for initiation based on clinical course and serial imaging in coordination with nsx team.  At this time suggest CT head 6/23/25 for consideration in ASA initiation.  Further anticoagulation regimen based on 4 week follow up unless otherwise acutely indicated then would further discuss overall benefit:risk.  Control of noted labile htn would need to be accounted for, EP consult for consideration in watchman if pt determined not to be anticoagulation candidate or if overall benefit.   -home statin regimen if applicable   -MRI Brain w/ as noted, repeat in 4-6 weeks upon resolution for further evaluation. Unclear if true stroke vs related to underlying ICH  -Dysphagia screen: passed, advance diet per protocol   -Physical therapy/OT/Speech eval/treatment.    - LDL/A1C as noted   -Stroke education     -CARDIOVASCULAR: - TTE as noted, cardiac monitoring w/ telemetry for now, noted 6 bt asx v tach overnights, electrolytes within range, further evaluation pending findings of noted workup  , rate control , cardiology and EP following for further guidance                                 -HEMATOLOGY: H/H without anemia , Platelets 200, patient should have all age and risk appropriate malignancy screenings with PCP or sooner if clinically suspected   -LE duplex negative for DVT      DVT ppx: Heparin s.c [x] LMWH [] SCD[x]     PULMONARY:  protecting airway, saturating well     RENAL: BUN/Cr within limits , monitor urine output, maintain adequate hydration       Na Goal:  135-145     Love: N    ID: currently afebrile, no leukocytosis, monitor for si/sx of infection   -UA negative nitrite, trace LE, many bacteria, not obtained via straight cath   -RVP negative  -LE doppler negative  -Blood cx NGTD x 2   -ID consult appreciated: suggested to monitor off abx     OTHER:  condition and plan of care d/w patient and wife at bedside. questions and concerns addressed.   -All incidental findings including but not limited to noted thyroid gland findings should be followed up with PCP/Endocrine at the soonest time possible.   -discussed with patient and wife importance of complying with xarelto once restarted. Instructed to take medications as prescribed once a day at the same time at night w/ evening meal. Verbalized understanding with teach back.     DISPOSITION: Acute Rehab once stable and workup is complete        CORE MEASURES     Admission NIHSS: 7     Tenecteplase : [] YES [x] NO     LDL/HDL/A1C: as noted      Depression Screen- if depression hx and/or present     Statin Therapy: as noted      Dysphagia Screen: [x] PASS [] FAIL     Smoking in the past 12 months [] YES [x] NO       Afib [x] YES [] NO     Stroke Education [x] YES [] NO     Diabetes [] YES [x] NO       Obtain screening lower extremity venous ultrasound in patients who meet 1 or more of the following criteria as patient is high risk for DVT/PE on admission:   [] History of DVT/PE  []Hypercoagulable states (Factor V Leiden, Cancer, OCP, etc. )  []Prolonged immobility (hemiplegia/hemiparesis/post operative or any other extended immobilization)  [] Transferred from outside facility (Rehab or Long term care)  [] Age </= to 50  [x] Stroke       ASSESSMENT: 79yo M with PMHx GERD, HTN, BPH, CAD on baby aspirin, s/p CABG 2012, afib on xarelto s/p ablation, presented to [facility] [place]  with confusion and new onset vision loss. Per patient's wife, Pt returned from a doctor's appointment afternoon in his usual state of health, last known well 12pm 6/11/25. A few hours after around 4pm, Pt started experiencing dizziness, memory loss, forgetfulness, and vision loss. Pt was walking in the wrong rooms and placing items in incorrect places.  CT head with left parietal-occipital IPH, CT angiogram head/neck with multifocal intracranial atherosclerotic disease. MR brain demonstrated stable non enhancing moderate left occipital evangelist IPH with adjacent infarctions.     Impression: Left posterior-occipital IPH. Post hemorrhagic acute adjacent infarcts possibly sequela of post hemorrhagic event, though can not entirely exclude embolism. Etiology concerning for hemorrhagic transformation of ischemic stroke vs hypertension with underlying coagulopathy. Follow up to resolution to exclude hemorrhagic transformation of ischemic infarction in the setting of hypertension.     NEURO:   -Neurologically without acute change, improvement in right hemiparesis noted today   -CT head stable 6/15/25  -stable  cerebral edema with mass effect and brain compression. Avoid hyponatremia.  -Stroke neuro checks q 2  hour, VS q 2 hours , can transition to q 4 hours    -Normotension as tolerated avoiding rapid fluctuations and hypotension, overall < 160/90mmHg    -ANTITHROMBOTIC THERAPY: AC on hold in setting of ICH and increased risk of bleeding, further timeline for initiation based on clinical course and serial imaging in coordination with nsx team.  At this time suggestion is 2 weeks with CT head 6/23/25 for consideration in ASA initiation. Discussed with Dr. Sherman from EP--patient is a good Watchman candidate and he recommends holding off on restarting Xarelto as he will schedule Watchman in 2 months.  -home statin regimen if applicable   -MRI Brain w/ as noted, repeat in 4-6 weeks upon resolution for further evaluation. Unclear if true stroke vs related to underlying ICH  -Dysphagia screen: passed, advance diet per protocol   -Physical therapy/OT/Speech eval/treatment.    - LDL/A1C as noted   -Stroke education     -CARDIOVASCULAR: - TTE as noted, cardiac monitoring w/ telemetry for now, noted 6 bt asx v tach overnights, electrolytes within range, further evaluation pending findings of noted workup  , rate control , cardiology and EP following for further guidance                                 -HEMATOLOGY: H/H without anemia , Platelets 200, patient should have all age and risk appropriate malignancy screenings with PCP or sooner if clinically suspected   -LE duplex negative for DVT      DVT ppx: Heparin s.c [x] LMWH [] SCD[x]     PULMONARY:  protecting airway, saturating well     RENAL: BUN/Cr within limits , monitor urine output, maintain adequate hydration       Na Goal:  135-145     Love: N    ID: currently afebrile, no leukocytosis, monitor for si/sx of infection   -UA negative nitrite, trace LE, many bacteria, not obtained via straight cath   -RVP negative  -LE doppler negative  -Blood cx NGTD x 2   -ID consult appreciated: suggested to monitor off abx     OTHER:  condition and plan of care d/w patient and wife at bedside. questions and concerns addressed.   -All incidental findings including but not limited to noted thyroid gland findings should be followed up with PCP/Endocrine at the soonest time possible.   -discussed with patient and wife importance of complying with xarelto once restarted. Instructed to take medications as prescribed once a day at the same time at night w/ evening meal. Verbalized understanding with teach back.     DISPOSITION: Acute Rehab once stable and workup is complete        CORE MEASURES     Admission NIHSS: 7     Tenecteplase : [] YES [x] NO     LDL/HDL/A1C: as noted      Depression Screen- if depression hx and/or present     Statin Therapy: as noted      Dysphagia Screen: [x] PASS [] FAIL     Smoking in the past 12 months [] YES [x] NO       Afib [x] YES [] NO     Stroke Education [x] YES [] NO     Diabetes [] YES [x] NO       Obtain screening lower extremity venous ultrasound in patients who meet 1 or more of the following criteria as patient is high risk for DVT/PE on admission:   [] History of DVT/PE  []Hypercoagulable states (Factor V Leiden, Cancer, OCP, etc. )  []Prolonged immobility (hemiplegia/hemiparesis/post operative or any other extended immobilization)  [] Transferred from outside facility (Rehab or Long term care)  [] Age </= to 50  [x] Stroke

## 2025-06-16 NOTE — DISCHARGE NOTE PROVIDER - PROVIDER TOKENS
PROVIDER:[TOKEN:[[id number]],FOLLOWUP:[1 month]],PROVIDER:[TOKEN:[[id number]],FOLLOWUP:[1 month]]

## 2025-06-16 NOTE — DISCHARGE NOTE PROVIDER - NSDCMRMEDTOKEN_GEN_ALL_CORE_FT
aspirin 81 mg oral capsule: 1 cap(s) orally once a day  atorvastatin 40 mg oral tablet: 1 tab(s) orally once a day  dexlansoprazole 60 mg oral delayed release capsule: 1 cap(s) orally once a day  hydrocodone-acetaminophen 10 mg-325 mg oral tablet: Take 1 to 2 tabs by mouth every 4 to 6 hrs if needed for pain (MDD 12)  hydrocodone-homatropine 5 mg-1.5 mg/5 mL oral syrup: 5 milliliter(s) orally 4 times a day  metoprolol succinate 50 mg oral tablet, extended release: 1 tab(s) orally once a day (at bedtime)  oxybutynin 15 mg/24 hr oral tablet, extended release: 1 tab(s) orally once a day  sacubitril-valsartan 24 mg-26 mg oral tablet: 1 tab(s) orally 2 times a day  Xarelto 20 mg oral tablet: 1 tab(s) orally once a day (in the evening)  zolpidem 5 mg oral tablet: 1 tab(s) orally once a day (at bedtime) as needed for  insomnia   atorvastatin 40 mg oral tablet: 1 tab(s) orally once a day  carvedilol 6.25 mg oral tablet: 1 tab(s) orally every 12 hours  enoxaparin: 40 milligram(s) subcutaneous once a day  melatonin 5 mg oral tablet: 1 tab(s) orally once a day (at bedtime)  oxybutynin 15 mg/24 hr oral tablet, extended release: 1 tab(s) orally once a day  pantoprazole 40 mg oral delayed release tablet: 1 tab(s) orally once a day (before a meal)  polyethylene glycol 3350 oral powder for reconstitution: 17 gram(s) orally once a day  sacubitril-valsartan 24 mg-26 mg oral tablet: 1 tab(s) orally 2 times a day  senna leaf extract oral tablet: 2 tab(s) orally once a day (at bedtime)

## 2025-06-16 NOTE — CONSULT NOTE ADULT - ASSESSMENT
Incomplete note, patient not seen  81yo male with PMH of obesity, BPH, Anemia s/p prior transfusions, HTN, HLD, CAD s/p CABG x 4 in 2012 (LIMA to LAD, SVG to OM, Diag, RPDA), persistent AF (on xarelto) s/p RF ablation (PVI, PW, CTI 1/2023 w/ Dr. Sherman), and known conduction disease (RBBB+ LAFB) who presented to [facility] [place] with confusion and new onset vision loss found to have an ICH. Pt was treated with Andexxa for Xarelto reversal. SBP also noted to be in 200s. EP consulted for Watchman evaluation.      Incomplete note, patient not seen  79yo male with PMH of obesity, BPH, Anemia s/p prior transfusions, HTN, HLD, CAD s/p CABG x 4 in 2012 (LIMA to LAD, SVG to OM, Diag, RPDA), persistent AF (on Xarelto s/p RF ablation (PVI, PW, CTI 1/2023 w/ Dr. Sherman), and known conduction disease (RBBB+ LAFB) who presented to [facility] [place] with confusion and new onset vision loss found to have an ICH. Pt was treated with Andexxa for Xarelto reversal. SBP also noted to be in 200s. EP consulted for Watchman evaluation.    Recommendations:  - Patient with known persistent Atrial fibrillation and underwent a RFA 9PVI, PW, CTI in 1/2023). His [id number] = 4 (age, HTN, CAD and so he has been on Xarelto for stroke prevention. Now s/p ICH while on Xarelto. Xarelto now stopped.  In lieu of his currently ICH, a LAAO procedure w/ Watchman device would be a good option for him to prevent future thromboembolic strokes while keeping him off long term anticoagulation.  Pt is agreeable and we could plan for this in approximately 2 months to allow for his recovery. He has been doing well since his ablation and has not had any reoccurrence of AF since. Recommend remaining of AC for now.   - Recommend obtaining CT of the [place] to assess anatomy. Can try to perform will inpatient, otherwise can be done in the outpatient setting.  - Outpatient follow up with Dr. Sherman  - Pt evaluated w/ Dr. Sherman, agrees w/ above      79yo male with PMH of obesity, BPH, Anemia s/p prior transfusions, HTN, HLD, CAD s/p CABG x 4 in 2012 (LIMA to LAD, SVG to OM, Diag, RPDA), persistent AF (on Xarelto s/p RF ablation (PVI, PW, CTI 1/2023 w/ Dr. Sherman), and known conduction disease (RBBB+ LAFB) who presented to [facility] [place] with confusion and new onset vision loss found to have an ICH. Pt was treated with Andexxa for Xarelto reversal. SBP also noted to be in 200s. EP consulted for Watchman evaluation.    Recommendations:  - Patient with known persistent atrial fibrillation and underwent a RFA (PVI, PW, CTI in 1/2023). His [id number] = 4 (age, HTN, CAD and so he has been on Xarelto for stroke prevention. Now s/p ICH while on Xarelto. Xarelto now stopped.  In lieu of his currently ICH, a LAAO procedure w/ Watchman device would be a good option for him to prevent future thromboembolic strokes while keeping him off long term anticoagulation.  Pt is agreeable and we could plan for this in approximately 2 months to allow for his recovery. He has been doing well since his ablation and has not had any reoccurrence of AF since. Recommend remaining of AC for now.   - Recommend obtaining CT of the [place] to assess anatomy. Can try to perform will inpatient, otherwise can be done in the outpatient setting.  - Outpatient follow up with Dr. Sherman  - Pt evaluated w/ Dr. Sherman, agrees w/ above      79yo male with PMH of obesity, BPH, Anemia s/p prior transfusions, HTN, HLD, CAD s/p CABG x 4 in 2012 (LIMA to LAD, SVG to OM, Diag, RPDA), persistent AF (on Xarelto s/p RF ablation (PVI, PW, CTI 1/2023 w/ Dr. Sherman), and known conduction disease (RBBB+ LAFB) who presented to [facility] [place] with confusion and new onset vision loss found to have an ICH. Pt was treated with Andexxa for Xarelto reversal. SBP also noted to be in 200s. EP consulted for Watchman evaluation.    Recommendations:  - Patient with known persistent atrial fibrillation and underwent a RFA (PVI, PW, CTI in 1/2023). His [id number] = 4 (age, HTN, CAD) and so he has been on Xarelto for stroke prevention. Now s/p ICH while on Xarelto. Xarelto now stopped.  In lieu of his currently ICH, a LAAO procedure w/ Watchman device would be a good option for him to prevent future thromboembolic strokes while keeping him off long term anticoagulation.  Pt is agreeable and we could plan for this in approximately 2 months to allow for his recovery. He has been doing well since his ablation and has not had any reoccurrence of AF since. Recommend remaining of AC for now.   - Recommend obtaining CT of the [place] to assess anatomy. Can try to perform will inpatient, otherwise can be done in the outpatient setting.  - Outpatient follow up with Dr. Sherman  - Pt evaluated w/ Dr. Sherman, agrees w/ above

## 2025-06-16 NOTE — PROGRESS NOTE ADULT - SUBJECTIVE AND OBJECTIVE BOX
Preliminary note, official recommendations pending attending review/signature   Seen and examined by Stroke team attending/team, assessment/ plan as discussed with stroke team attending/team as noted.     [facility] Stroke Team  Progress Note     HPI:  79yo M with PMHx GERD, HTN, BPH, CAD on baby aspirin, s/p CABG 2012, afib on xarelto s/p ablation, [place] [facility] [place] and new onset vision loss. Per patient's wife, Pt returned from a doctor's appointment afternoon in his usual state of health, last known well 12pm 6/11/25. A few hours after around 4pm, Pt started experiencing dizziness, memory loss, forgetfulness, and vision loss. Pt was walking in the wrong rooms and placing items in incorrect places.  Pt was transferred to [facility] for further management after being found to have ICH.     SUBJECTIVE: No events overnight.  No new neurologic complaints.  ROS reported negative unless otherwise noted.    acetaminophen     Tablet .. 650 milliGRAM(s) Oral every 6 hours PRN  atorvastatin 40 milliGRAM(s) Oral at bedtime  carvedilol 6.25 milliGRAM(s) Oral every 12 hours  chlorhexidine 2% Cloths 1 Application(s) Topical daily  dextrose 50% Injectable 25 Gram(s) IV Push once  enoxaparin Injectable 40 milliGRAM(s) SubCutaneous every 24 hours  hydrALAZINE Injectable 10 milliGRAM(s) IV Push every 2 hours PRN  labetalol Injectable 10 milliGRAM(s) IV Push every 2 hours PRN  melatonin 5 milliGRAM(s) Oral at bedtime  oxybutynin XL 15 milliGRAM(s) Oral daily  pantoprazole    Tablet 40 milliGRAM(s) Oral before breakfast  polyethylene glycol 3350 17 Gram(s) Oral daily  sacubitril 24 mG/valsartan 26 mG 1 Tablet(s) Oral two times a day  senna 2 Tablet(s) Oral at bedtime  sodium chloride 0.9%. 1000 milliLiter(s) IV Continuous <Continuous>      PHYSICAL EXAM:   Vital Signs Last 24 Hrs  T(C): 36.8 (16 Jun 2025 07:08), Max: 37.3 (15 Yazan 2025 19:22)  T(F): 98.3 (16 Jun 2025 07:08), Max: 99.2 (15 Yazan 2025 19:22)  HR: 78 (16 Jun 2025 08:00) (78 - 101)  BP: 159/67 (16 Jun 2025 08:00) (118/86 - 171/106)  BP(mean): 91 (16 Jun 2025 08:00) (91 - 126)  RR: 17 (16 Jun 2025 08:00) (15 - 19)  SpO2: 96% (16 Jun 2025 08:00) (94% - 100%)    Parameters below as of 16 Jun 2025 08:00  Patient On (Oxygen Delivery Method): room air      EXAM PENDING     General: No acute distress.   HEENT: Head normocephalic, atraumatic. Conjunctivae clear w/o exudates or hemorrhage.       Respiratory: Lung sounds clear in all fields. Chest wall symmetric, nontender.   Abdominal: Soft, nondistended, nontender. Bowel sounds normoactive x 4 quadrants.    Skin: Skin is warm, dry, no lesions, erythema, swelling, drainage   Extremities: No edema     Mental status: Awake, alert, fully participating in exam. The patient is oriented to person, place, disoriented to year (wife states since stroke he has had difficulty). Mild aphasia - unable to perform crossed complex commands. Repetition is intact.    Cranial nerves: PERR, Right homonomous hemianopia. Extraocular motion is full with no nystagmus. There is no ptosis. No facial palsy appreciated.      Motor: There is normal bulk and tone. There is no tremor.   - Improving right hemiparesis: no drift in RUE/RLE today   - Strength is 5/5 in the left arm and leg.    Sensation: RLE sensory neglect    Cerebellar: There is no dysmetria on finger to nose testing.    Gait : deferred      LABS:                        14.8   7.11  )-----------( 200      ( 15 Yazan 2025 06:20 )             45.8    06-15    137  |  103  |  19.2  ----------------------------<  118[H]  4.1   |  19.0[L]  |  0.99    Ca    8.6      15 Yazan 2025 06:20  Phos  3.6     06-15  Mg     2.0     06-15            06-12 Chol 142 LDL -65 - HDL 64 Trig 66    A1C: 5.7        RADIOLOGY & ADDITIONAL STUDIES (independently reviewed unless otherwise noted):  Neuro-Imaging     CT Head No Cont (06.15.25 @ 12:22)   IMPRESSION: Stable moderate to large acute left parietal lobe hemorrhage   moderate adjacent vasogenic edema..    CT Head No Cont (06.12.25 @ 21:06)   Stable size of left parietal lobe hematoma and unchanged local mass   effect comparing to 6/11/2025.    MR Head w/wo IV Cont (06.12.25 @ 13:05)   IMPRESSION: Stable nonenhancing moderate size left occipital lobe   hematoma with small adjacent acute anterior infarcts.    CT Head No Cont (06.12.25 @ 21:06)   Stable size of left parietal lobe hematoma and unchanged local mass   effect comparing to 6/11/2025.    CT Head No Cont (06.11.25 @ 22:47)   Stable left posterior parietal-occipital paracentral intraparenchymal   hematoma    6/11/25  CT HEAD:  Left posterior parietal-occipital paracentral intraparenchymal hematoma measuring 3.4 x 3.8 x 3.5 cm with surrounding edema and mass effect on the splenium of the corpus callosum and atrium of the left lateral ventricle. Consider further evaluation via pre and postcontrast MR imaging of the brain, provided the patient has no contraindications.    CTA NECK:  1. No evidence of significant stenosis or occlusion.  2. Right lobe of the thyroid gland is prominent appearance measuring 8.4 x 4.8 x 5.1 cm in diameter. The thyroid isthmus is also prominent appearance measuring up to 3 cm in diameter. Several nodules are appreciated within the bilateral lobes of the thyroid gland. Findings include a 1.7 x 1.6 cm nodule with peripheral calcification within the region of the thyroid isthmus. Consider follow-up dedicated thyroid ultrasound assessment on a nonemergent basis for further characterization, as clinically indicated and provided the patient has no contraindications.    CTA HEAD:  1. No large vessel occlusion.  2. Mild stenosis in association with the distal intracranial bilateral vertebral arteries..  3. Hypoplastic left P1 segment with a fetal origin of the left posterior cerebral artery.  4. Left A1 segment is hypoplastic. Bilateral A2 segments appear to fill via the right A1 segment.  5.No aneurysm identified. Tiny aneurysms can be beyond the resolution of CTA technique. No discrete AVM appreciated.  --  Cardiology   TTE W or WO Ultrasound Enhancing Agent (06.13.25 @ 17:14)   CONCLUSIONS:      1. Left ventricular systolic function is normal with an ejection fraction visually estimated at 55 to 60 %. There are no regional wall motion abnormalities seen.   2. There is normal LV mass and normal geometry.   3. The left ventricular diastolic function is indeterminate. Analysis of left ventricular diastolic function and filling pressure is made challenging by the presence of frequent ectopic beats.   4. Normal right ventricular cavity size and borderline reduced right ventricular systolic function.   5. Left atrium is moderately dilated.   6. Trileaflet aortic valve with reduced systolic excursion. There is moderate calcification of the aortic valve leaflets. Moderate aortic stenosis.   7. The peak transaortic velocity is 2.51 m/s, peak transaortic gradient is 25.2 mmHg and mean transaortic gradient is 14.0 mmHg with an LVOT/aortic valve VTI ratio of 0.34. The effective orifice area is estimated at 1.37 cm² by the continuity equation and indexed at 0.59 cm²/m².   8. There is mild posterior calcification of the mitral valve annulus.  --  Ultrasound   US Duplex Venous Lower Ext Complete, Bilateral (06.14.25 @ 12:04)   IMPRESSION:  No evidence of deep venous thrombosis in either lower extremity.                 Preliminary note, official recommendations pending attending review/signature   Seen and examined by Stroke team attending/team, assessment/ plan as discussed with stroke team attending/team as noted.     [facility] Stroke Team  Progress Note     HPI:  79yo M with PMHx GERD, HTN, BPH, CAD on baby aspirin, s/p CABG 2012, afib on xarelto s/p ablation, [place] [facility] [place] and new onset vision loss. Per patient's wife, Pt returned from a doctor's appointment afternoon in his usual state of health, last known well 12pm 6/11/25. A few hours after around 4pm, Pt started experiencing dizziness, memory loss, forgetfulness, and vision loss. Pt was walking in the wrong rooms and placing items in incorrect places.  Pt was transferred to [facility] for further management after being found to have ICH.     SUBJECTIVE: 6bt vtach overnight- asympotomatic.  No new neurologic complaints.  ROS reported negative unless otherwise noted.    acetaminophen     Tablet .. 650 milliGRAM(s) Oral every 6 hours PRN  atorvastatin 40 milliGRAM(s) Oral at bedtime  carvedilol 6.25 milliGRAM(s) Oral every 12 hours  chlorhexidine 2% Cloths 1 Application(s) Topical daily  dextrose 50% Injectable 25 Gram(s) IV Push once  enoxaparin Injectable 40 milliGRAM(s) SubCutaneous every 24 hours  hydrALAZINE Injectable 10 milliGRAM(s) IV Push every 2 hours PRN  labetalol Injectable 10 milliGRAM(s) IV Push every 2 hours PRN  melatonin 5 milliGRAM(s) Oral at bedtime  oxybutynin XL 15 milliGRAM(s) Oral daily  pantoprazole    Tablet 40 milliGRAM(s) Oral before breakfast  polyethylene glycol 3350 17 Gram(s) Oral daily  sacubitril 24 mG/valsartan 26 mG 1 Tablet(s) Oral two times a day  senna 2 Tablet(s) Oral at bedtime  sodium chloride 0.9%. 1000 milliLiter(s) IV Continuous <Continuous>      PHYSICAL EXAM:   Vital Signs Last 24 Hrs  T(C): 36.8 (16 Jun 2025 07:08), Max: 37.3 (15 Yazan 2025 19:22)  T(F): 98.3 (16 Jun 2025 07:08), Max: 99.2 (15 Yazan 2025 19:22)  HR: 78 (16 Jun 2025 08:00) (78 - 101)  BP: 159/67 (16 Jun 2025 08:00) (118/86 - 171/106)  BP(mean): 91 (16 Jun 2025 08:00) (91 - 126)  RR: 17 (16 Jun 2025 08:00) (15 - 19)  SpO2: 96% (16 Jun 2025 08:00) (94% - 100%)    Parameters below as of 16 Jun 2025 08:00  Patient On (Oxygen Delivery Method): room air           General: No acute distress.   HEENT: Head normocephalic, atraumatic. Conjunctivae clear w/o exudates or hemorrhage.       Respiratory: Lung sounds clear in all fields. Chest wall symmetric, nontender.   Abdominal: Soft, nondistended, nontender. Bowel sounds normoactive x 4 quadrants.    Skin: Skin is warm, dry, no lesions, erythema, swelling, drainage   Extremities: No edema     Mental status: Awake, alert, fully participating in exam. The patient is oriented to person, place, disoriented to year (wife states since stroke he has had difficulty).  unable to perform crossed complex commands. Repetition is intact. Mildly impaired recall.    Cranial nerves: pupils equal, Right homonomous hemianopia. Extraocular motion is full with no nystagmus. There is no ptosis. No facial palsy appreciated.      Motor: There is normal bulk and tone. There is no tremor.   - Improving right hemiparesis: no drift in RUE/RLE  and full strength appreciated   - Strength is 5/5 in the left arm and leg.    Sensation: RLE sensory neglect    Cerebellar: There is no dysmetria on finger to nose testing.    Gait : deferred      LABS:                        14.8   7.11  )-----------( 200      ( 15 Yazan 2025 06:20 )             45.8    06-15    137  |  103  |  19.2  ----------------------------<  118[H]  4.1   |  19.0[L]  |  0.99    Ca    8.6      15 Yazan 2025 06:20  Phos  3.6     06-15  Mg     2.0     06-15            06-12 Chol 142 LDL -65 - HDL 64 Trig 66    A1C: 5.7        RADIOLOGY & ADDITIONAL STUDIES (independently reviewed unless otherwise noted):  Neuro-Imaging     CT Head No Cont (06.15.25 @ 12:22)   IMPRESSION: Stable moderate to large acute left parietal lobe hemorrhage   moderate adjacent vasogenic edema..    CT Head No Cont (06.12.25 @ 21:06)   Stable size of left parietal lobe hematoma and unchanged local mass   effect comparing to 6/11/2025.    MR Head w/wo IV Cont (06.12.25 @ 13:05)   IMPRESSION: Stable nonenhancing moderate size left occipital lobe   hematoma with small adjacent acute anterior infarcts.    CT Head No Cont (06.12.25 @ 21:06)   Stable size of left parietal lobe hematoma and unchanged local mass   effect comparing to 6/11/2025.    CT Head No Cont (06.11.25 @ 22:47)   Stable left posterior parietal-occipital paracentral intraparenchymal   hematoma    6/11/25  CT HEAD:  Left posterior parietal-occipital paracentral intraparenchymal hematoma measuring 3.4 x 3.8 x 3.5 cm with surrounding edema and mass effect on the splenium of the corpus callosum and atrium of the left lateral ventricle. Consider further evaluation via pre and postcontrast MR imaging of the brain, provided the patient has no contraindications.    CTA NECK:  1. No evidence of significant stenosis or occlusion.  2. Right lobe of the thyroid gland is prominent appearance measuring 8.4 x 4.8 x 5.1 cm in diameter. The thyroid isthmus is also prominent appearance measuring up to 3 cm in diameter. Several nodules are appreciated within the bilateral lobes of the thyroid gland. Findings include a 1.7 x 1.6 cm nodule with peripheral calcification within the region of the thyroid isthmus. Consider follow-up dedicated thyroid ultrasound assessment on a nonemergent basis for further characterization, as clinically indicated and provided the patient has no contraindications.    CTA HEAD:  1. No large vessel occlusion.  2. Mild stenosis in association with the distal intracranial bilateral vertebral arteries..  3. Hypoplastic left P1 segment with a fetal origin of the left posterior cerebral artery.  4. Left A1 segment is hypoplastic. Bilateral A2 segments appear to fill via the right A1 segment.  5.No aneurysm identified. Tiny aneurysms can be beyond the resolution of CTA technique. No discrete AVM appreciated.  --  Cardiology   TTE W or WO Ultrasound Enhancing Agent (06.13.25 @ 17:14)   CONCLUSIONS:      1. Left ventricular systolic function is normal with an ejection fraction visually estimated at 55 to 60 %. There are no regional wall motion abnormalities seen.   2. There is normal LV mass and normal geometry.   3. The left ventricular diastolic function is indeterminate. Analysis of left ventricular diastolic function and filling pressure is made challenging by the presence of frequent ectopic beats.   4. Normal right ventricular cavity size and borderline reduced right ventricular systolic function.   5. Left atrium is moderately dilated.   6. Trileaflet aortic valve with reduced systolic excursion. There is moderate calcification of the aortic valve leaflets. Moderate aortic stenosis.   7. The peak transaortic velocity is 2.51 m/s, peak transaortic gradient is 25.2 mmHg and mean transaortic gradient is 14.0 mmHg with an LVOT/aortic valve VTI ratio of 0.34. The effective orifice area is estimated at 1.37 cm² by the continuity equation and indexed at 0.59 cm²/m².   8. There is mild posterior calcification of the mitral valve annulus.  --  Ultrasound   US Duplex Venous Lower Ext Complete, Bilateral (06.14.25 @ 12:04)   IMPRESSION:  No evidence of deep venous thrombosis in either lower extremity.                     [facility] Stroke Team  Progress Note     HPI:  79yo M with PMHx GERD, HTN, BPH, CAD on baby aspirin, s/p CABG 2012, afib on xarelto s/p ablation, [place] [facility] [place] vision [place]. Per patient's wife, Pt returned from a doctor's appointment afternoon in his usual state of health, last known well 12pm 6/11/25. A few hours after around 4pm, Pt started experiencing dizziness, memory loss, forgetfulness, and vision loss. Pt was walking in the wrong rooms and placing items in incorrect places.  Pt was transferred to [facility] for further management after being found to have ICH.     SUBJECTIVE: 6bt vtach overnight- asympotomatic.  No new neurologic complaints.  ROS reported negative unless otherwise noted.    acetaminophen     Tablet .. 650 milliGRAM(s) Oral every 6 hours PRN  atorvastatin 40 milliGRAM(s) Oral at bedtime  carvedilol 6.25 milliGRAM(s) Oral every 12 hours  chlorhexidine 2% Cloths 1 Application(s) Topical daily  dextrose 50% Injectable 25 Gram(s) IV Push once  enoxaparin Injectable 40 milliGRAM(s) SubCutaneous every 24 hours  hydrALAZINE Injectable 10 milliGRAM(s) IV Push every 2 hours PRN  labetalol Injectable 10 milliGRAM(s) IV Push every 2 hours PRN  melatonin 5 milliGRAM(s) Oral at bedtime  oxybutynin XL 15 milliGRAM(s) Oral daily  pantoprazole    Tablet 40 milliGRAM(s) Oral before breakfast  polyethylene glycol 3350 17 Gram(s) Oral daily  sacubitril 24 mG/valsartan 26 mG 1 Tablet(s) Oral two times a day  senna 2 Tablet(s) Oral at bedtime  sodium chloride 0.9%. 1000 milliLiter(s) IV Continuous <Continuous>      PHYSICAL EXAM:   Vital Signs Last 24 Hrs  T(C): 36.8 (16 Jun 2025 07:08), Max: 37.3 (15 Yazan 2025 19:22)  T(F): 98.3 (16 Jun 2025 07:08), Max: 99.2 (15 Yazan 2025 19:22)  HR: 78 (16 Jun 2025 08:00) (78 - 101)  BP: 159/67 (16 Jun 2025 08:00) (118/86 - 171/106)  BP(mean): 91 (16 Jun 2025 08:00) (91 - 126)  RR: 17 (16 Jun 2025 08:00) (15 - 19)  SpO2: 96% (16 Jun 2025 08:00) (94% - 100%)    Parameters below as of 16 Jun 2025 08:00  Patient On (Oxygen Delivery Method): room air           General: No acute distress.   HEENT: Head normocephalic, atraumatic. Conjunctivae clear w/o exudates or hemorrhage.       Respiratory: Lung sounds clear in all fields. Chest wall symmetric, nontender.   Abdominal: Soft, nondistended, nontender. Bowel sounds normoactive x 4 quadrants.    Skin: Skin is warm, dry, no lesions, erythema, swelling, drainage   Extremities: No edema     Mental status: Awake, alert, fully participating in exam. The patient is oriented to person, place, disoriented to year (wife states since stroke he has had difficulty).  unable to perform crossed complex commands. Repetition is intact. Mildly impaired recall.    Cranial nerves: pupils equal, Right homonomous hemianopia. Extraocular motion is full with no nystagmus. There is no ptosis. No facial palsy appreciated.      Motor: There is normal bulk and tone. There is no tremor.   - Improving right hemiparesis: no drift in RUE/RLE  and full strength appreciated   - Strength is 5/5 in the left arm and leg.    Sensation: RLE sensory neglect    Cerebellar: There is no dysmetria on finger to nose testing.    Gait : deferred      LABS:                        14.8   7.11  )-----------( 200      ( 15 Yazan 2025 06:20 )             45.8    06-15    137  |  103  |  19.2  ----------------------------<  118[H]  4.1   |  19.0[L]  |  0.99    Ca    8.6      15 Yazan 2025 06:20  Phos  3.6     06-15  Mg     2.0     06-15            06-12 Chol 142 LDL -65 - HDL 64 Trig 66    A1C: 5.7        RADIOLOGY & ADDITIONAL STUDIES (independently reviewed unless otherwise noted):  Neuro-Imaging     CT Head No Cont (06.15.25 @ 12:22)   IMPRESSION: Stable moderate to large acute left parietal lobe hemorrhage   moderate adjacent vasogenic edema..    CT Head No Cont (06.12.25 @ 21:06)   Stable size of left parietal lobe hematoma and unchanged local mass   effect comparing to 6/11/2025.    MR Head w/wo IV Cont (06.12.25 @ 13:05)   IMPRESSION: Stable nonenhancing moderate size left occipital lobe   hematoma with small adjacent acute anterior infarcts.    CT Head No Cont (06.12.25 @ 21:06)   Stable size of left parietal lobe hematoma and unchanged local mass   effect comparing to 6/11/2025.    CT Head No Cont (06.11.25 @ 22:47)   Stable left posterior parietal-occipital paracentral intraparenchymal   hematoma    6/11/25  CT HEAD:  Left posterior parietal-occipital paracentral intraparenchymal hematoma measuring 3.4 x 3.8 x 3.5 cm with surrounding edema and mass effect on the splenium of the corpus callosum and atrium of the left lateral ventricle. Consider further evaluation via pre and postcontrast MR imaging of the brain, provided the patient has no contraindications.    CTA NECK:  1. No evidence of significant stenosis or occlusion.  2. Right lobe of the thyroid gland is prominent appearance measuring 8.4 x 4.8 x 5.1 cm in diameter. The thyroid isthmus is also prominent appearance measuring up to 3 cm in diameter. Several nodules are appreciated within the bilateral lobes of the thyroid gland. Findings include a 1.7 x 1.6 cm nodule with peripheral calcification within the region of the thyroid isthmus. Consider follow-up dedicated thyroid ultrasound assessment on a nonemergent basis for further characterization, as clinically indicated and provided the patient has no contraindications.    CTA HEAD:  1. No large vessel occlusion.  2. Mild stenosis in association with the distal intracranial bilateral vertebral arteries..  3. Hypoplastic left P1 segment with a fetal origin of the left posterior cerebral artery.  4. Left A1 segment is hypoplastic. Bilateral A2 segments appear to fill via the right A1 segment.  5.No aneurysm identified. Tiny aneurysms can be beyond the resolution of CTA technique. No discrete AVM appreciated.  --  Cardiology   TTE W or WO Ultrasound Enhancing Agent (06.13.25 @ 17:14)   CONCLUSIONS:      1. Left ventricular systolic function is normal with an ejection fraction visually estimated at 55 to 60 %. There are no regional wall motion abnormalities seen.   2. There is normal LV mass and normal geometry.   3. The left ventricular diastolic function is indeterminate. Analysis of left ventricular diastolic function and filling pressure is made challenging by the presence of frequent ectopic beats.   4. Normal right ventricular cavity size and borderline reduced right ventricular systolic function.   5. Left atrium is moderately dilated.   6. Trileaflet aortic valve with reduced systolic excursion. There is moderate calcification of the aortic valve leaflets. Moderate aortic stenosis.   7. The peak transaortic velocity is 2.51 m/s, peak transaortic gradient is 25.2 mmHg and mean transaortic gradient is 14.0 mmHg with an LVOT/aortic valve VTI ratio of 0.34. The effective orifice area is estimated at 1.37 cm² by the continuity equation and indexed at 0.59 cm²/m².   8. There is mild posterior calcification of the mitral valve annulus.  --  Ultrasound   US Duplex Venous Lower Ext Complete, Bilateral (06.14.25 @ 12:04)   IMPRESSION:  No evidence of deep venous thrombosis in either lower extremity.

## 2025-06-16 NOTE — CONSULT NOTE ADULT - SUBJECTIVE AND OBJECTIVE BOX
[place] [facility]  [facility]   Office: [address] [place] [address]  Telephone: [phone] Fax:[phone]      HPI: 79yo male with PMH of obesity, BPH, HTN, HLD, CAD s/p CABG (; LIMA to LAD, SVG to OM, Diag, RPDA), AF (on xarelto) s/p prior cardiac ablation, and known conduction disease (RBBB+ LAFB) who presented to [facility] with confusion and new onset vision loss found to have an ICH. Pt was treated with Andexxa for xarelto reversal. SBP also noted to be in 200s. EP consulted for Watchman evaluation    EKG:  TELE:      Cardiologist: Dr. Mireles    Cardiology Summary  EC25: Sinus rhythm, RBBB, LAFB, PAC    Echo: 2021: Ejection fraction 45 to 50%, mild MR, mild aortic stenosis, moderately dilated left atrium, mild global LV systolic dysfunction, mild concentric LVH, mild right atrial enlargement, minimal TR, borderline elevated PASP at 37 mmHg.    Cardiac Cath/PCI: 3/26/2019: LIMA to LAD patent, SVG to RPDA and SVG to OM 2 patent. SVG to D1 likely occluded  2022: LIMA to LAD patent, SVG to RPDA and SVG to OM 2 patent. SVG to D1 likely occluded        PAST MEDICAL & SURGICAL HISTORY:  Afib   HTN (hypertension)  HLD (hyperlipidemia)  BPH with urinary obstruction  Obesity  Urolithiasis  S/P CABG x 4      REVIEW OF SYSTEMS:    CONSTITUTIONAL: No fever, weight loss, or fatigue  EYES: No visual disturbances  NECK: No pain or stiffness  RESPIRATORY: No cough, wheezing, chills or hemoptysis; No shortness of breath  CARDIOVASCULAR: see HPI  GASTROINTESTINAL: No abdominal or epigastric pain. No nausea, vomiting, or hematemesis; No diarrhea or constipation. No melena or hematochezia.  NEUROLOGICAL: No headaches, memory loss, loss of strength, numbness, or tremors  SKIN: No itching, burning, rashes, or lesions   LYMPH NODES: No enlarged glands  ENDOCRINE: No heat or cold intolerance; No hair loss  PSYCHIATRIC: No depression, anxiety, mood swings, or difficulty sleeping  HEME/LYMPH: No easy bruising, or bleeding gums      MEDICATIONS  (STANDING):  atorvastatin 40 milliGRAM(s) Oral at bedtime  carvedilol 6.25 milliGRAM(s) Oral every 12 hours  chlorhexidine 2% Cloths 1 Application(s) Topical daily  dextrose 50% Injectable 25 Gram(s) IV Push once  enoxaparin Injectable 40 milliGRAM(s) SubCutaneous every 24 hours  melatonin 5 milliGRAM(s) Oral at bedtime  oxybutynin XL 15 milliGRAM(s) Oral daily  pantoprazole    Tablet 40 milliGRAM(s) Oral before breakfast  polyethylene glycol 3350 17 Gram(s) Oral daily  sacubitril 24 mG/valsartan 26 mG 1 Tablet(s) Oral two times a day  senna 2 Tablet(s) Oral at bedtime  sodium chloride 0.9%. 1000 milliLiter(s) (50 mL/Hr) IV Continuous <Continuous>    MEDICATIONS  (PRN):  acetaminophen     Tablet .. 650 milliGRAM(s) Oral every 6 hours PRN Temp greater or equal to 38C (100.4F), Mild Pain (1 - 3)  hydrALAZINE Injectable 10 milliGRAM(s) IV Push every 2 hours PRN SBP>160  labetalol Injectable 10 milliGRAM(s) IV Push every 2 hours PRN SBP>160      Allergies  Biaxin (Rash; Urticaria; Nausea)      SOCIAL HISTORY:    FAMILY HISTORY:  FH: CVA (cerebrovascular accident) (Father)        Vital Signs Last 24 Hrs  T(C): 36.8 (2025 07:08), Max: 37.3 (15 Yazan 2025 19:22)  T(F): 98.3 (2025 07:08), Max: 99.2 (15 Yazan 2025 19:22)  HR: 88 (2025 10:00) (78 - 101)  BP: 130/89 (2025 10:00) (118/86 - 171/106)  BP(mean): 104 (2025 10:00) (91 - 126)  RR: 18 (2025 10:00) (15 - 19)  SpO2: 99% (2025 10:00) (94% - 100%)    Parameters below as of 2025 10:00  Patient On (Oxygen Delivery Method): room air        Physical Exam:  Constitutional: AAOx3, NAD  Neck: supple, No JVD  Cardiovascular: +S1S2 RRR, no murmurs, rubs, gallops   Pulmonary: CTA b/l, unlabored, no wheezes, rales. rhonci  Abdomen: +BS, soft NTND  Extremities: no edema b/l, +distal pulses b/l  Neuro: non focal, speech clear, MOREL x 4    LABS:                        14.8   7.11  )-----------( 200      ( 15 Yazan 2025 06:20 )             45.8   06-15    137  |  103  |  19.2  ----------------------------<  118[H]  4.1   |  19.0[L]  |  0.99    Ca    8.6      15 Yazan 2025 06:20  Phos  3.6     06-15  Mg     2.0     -15          Urinalysis Basic - ( 15 Yazan 2025 06:20 )    Color: x / Appearance: x / SG: x / pH: x  Gluc: 118 mg/dL / Ketone: x  / Bili: x / Urobili: x   Blood: x / Protein: x / Nitrite: x   Leuk Esterase: x / RBC: x / WBC x   Sq Epi: x / Non Sq Epi: x / Bacteria: x        RADIOLOGY & ADDITIONAL STUDIES:  TTE 2025  CONCLUSIONS:      1. Left ventricular systolic function is normal with an ejection fraction visually estimated at 55 to 60 %. There are no regional wall motion abnormalities seen.   2. There is normal LV mass and normal geometry.   3. The left ventricular diastolic function is indeterminate. Analysis of left ventricular diastolic function and filling pressure is made challenging by the presence of frequent ectopic beats.   4. Normal right ventricular cavity size and borderline reduced right ventricular systolic function.   5. Left atrium is moderately dilated.   6. Trileaflet aortic valve with reduced systolic excursion. There is moderate calcification of the aortic valve leaflets. Moderate aortic stenosis.   7. The peak transaortic velocity is 2.51 m/s, peak transaortic gradient is 25.2 mmHg and mean transaortic gradient is 14.0 mmHg with an LVOT/aortic valve VTI ratio of 0.34. The effective orifice area is estimated at 1.37 cm² by the continuity equation and indexed at 0.59 cm²/m².   8. There is mild posterior calcification of the mitral valve annulus.     [place] [facility]  [facility]   Office: [address] [place] [address]  Telephone: [phone] Fax:[phone]      HPI: 81yo male with PMH of obesity, BPH, HTN, HLD, CAD s/p CABG (; LIMA to LAD, SVG to OM, Diag, RPDA), AF (on xarelto) s/p prior cardiac ablation, and known conduction disease (RBBB+ LAFB) who presented to [facility] with confusion and new onset vision loss found to have an ICH. Pt was treated with Andexxa for xarelto reversal. SBP also noted to be in 200s. EP consulted for Watchman evaluation    EKG:  TELE:      Cardiologist: Dr. Mireles    Cardiology Summary  EC25: Sinus rhythm, RBBB, LAFB, PAC    Echo: 2021: Ejection fraction 45 to 50%, mild MR, mild aortic stenosis, moderately dilated left atrium, mild global LV systolic dysfunction, mild concentric LVH, mild right atrial enlargement, minimal TR, borderline elevated PASP at 37 mmHg.    Cardiac Cath/PCI: 3/26/2019: LIMA to LAD patent, SVG to RPDA and SVG to OM 2 patent. SVG to D1 likely occluded  2022: LIMA to LAD patent, SVG to RPDA and SVG to OM 2 patent. SVG to D1 likely occluded        PAST MEDICAL & SURGICAL HISTORY:  Afib   HTN (hypertension)  HLD (hyperlipidemia)  BPH with urinary obstruction  Obesity  Urolithiasis  S/P CABG x 4      REVIEW OF SYSTEMS:    CONSTITUTIONAL: No fever, weight loss, or fatigue  EYES: No visual disturbances  NECK: No pain or stiffness  RESPIRATORY: No cough, wheezing, chills or hemoptysis; No shortness of breath  CARDIOVASCULAR: see HPI  GASTROINTESTINAL: No abdominal or epigastric pain. No nausea, vomiting, or hematemesis; No diarrhea or constipation. No melena or hematochezia.  NEUROLOGICAL: No headaches, memory loss, loss of strength, numbness, or tremors  SKIN: No itching, burning, rashes, or lesions   LYMPH NODES: No enlarged glands  ENDOCRINE: No heat or cold intolerance; No hair loss  PSYCHIATRIC: No depression, anxiety, mood swings, or difficulty sleeping  HEME/LYMPH: No easy bruising, or bleeding gums      MEDICATIONS  (STANDING):  atorvastatin 40 milliGRAM(s) Oral at bedtime  carvedilol 6.25 milliGRAM(s) Oral every 12 hours  chlorhexidine 2% Cloths 1 Application(s) Topical daily  dextrose 50% Injectable 25 Gram(s) IV Push once  enoxaparin Injectable 40 milliGRAM(s) SubCutaneous every 24 hours  melatonin 5 milliGRAM(s) Oral at bedtime  oxybutynin XL 15 milliGRAM(s) Oral daily  pantoprazole    Tablet 40 milliGRAM(s) Oral before breakfast  polyethylene glycol 3350 17 Gram(s) Oral daily  sacubitril 24 mG/valsartan 26 mG 1 Tablet(s) Oral two times a day  senna 2 Tablet(s) Oral at bedtime  sodium chloride 0.9%. 1000 milliLiter(s) (50 mL/Hr) IV Continuous <Continuous>    MEDICATIONS  (PRN):  acetaminophen     Tablet .. 650 milliGRAM(s) Oral every 6 hours PRN Temp greater or equal to 38C (100.4F), Mild Pain (1 - 3)  hydrALAZINE Injectable 10 milliGRAM(s) IV Push every 2 hours PRN SBP>160  labetalol Injectable 10 milliGRAM(s) IV Push every 2 hours PRN SBP>160      Allergies  Biaxin (Rash; Urticaria; Nausea)      SOCIAL HISTORY:    FAMILY HISTORY:  FH: CVA (cerebrovascular accident) (Father)        Vital Signs Last 24 Hrs  T(C): 36.8 (2025 07:08), Max: 37.3 (15 Yazan 2025 19:22)  T(F): 98.3 (2025 07:08), Max: 99.2 (15 Yazan 2025 19:22)  HR: 88 (2025 10:00) (78 - 101)  BP: 130/89 (2025 10:00) (118/86 - 171/106)  BP(mean): 104 (2025 10:00) (91 - 126)  RR: 18 (2025 10:00) (15 - 19)  SpO2: 99% (2025 10:00) (94% - 100%)    Parameters below as of 2025 10:00  Patient On (Oxygen Delivery Method): room air        Physical Exam:  Constitutional: AAOx3, NAD  Neck: supple, No JVD  Cardiovascular: +S1S2 RRR, no murmurs, rubs, gallops   Pulmonary: CTA b/l, unlabored, no wheezes, rales. rhonci  Abdomen: +BS, soft NTND  Extremities: no edema b/l, +distal pulses b/l  Neuro: non focal, speech clear, MOREL x 4    LABS:                        14.8   7.11  )-----------( 200      ( 15 Yazan 2025 06:20 )             45.8   06-15    137  |  103  |  19.2  ----------------------------<  118[H]  4.1   |  19.0[L]  |  0.99    Ca    8.6      15 Yazan 2025 06:20  Phos  3.6     06-15  Mg     2.0     -15          Urinalysis Basic - ( 15 Yazan 2025 06:20 )    Color: x / Appearance: x / SG: x / pH: x  Gluc: 118 mg/dL / Ketone: x  / Bili: x / Urobili: x   Blood: x / Protein: x / Nitrite: x   Leuk Esterase: x / RBC: x / WBC x   Sq Epi: x / Non Sq Epi: x / Bacteria: x        RADIOLOGY & ADDITIONAL STUDIES:  TTE 2025  CONCLUSIONS:      1. Left ventricular systolic function is normal with an ejection fraction visually estimated at 55 to 60 %. There are no regional wall motion abnormalities seen.   2. There is normal LV mass and normal geometry.   3. The left ventricular diastolic function is indeterminate. Analysis of left ventricular diastolic function and filling pressure is made challenging by the presence of frequent ectopic beats.   4. Normal right ventricular cavity size and borderline reduced right ventricular systolic function.   5. Left atrium is moderately dilated.   6. Trileaflet aortic valve with reduced systolic excursion. There is moderate calcification of the aortic valve leaflets. Moderate aortic stenosis.   7. The peak transaortic velocity is 2.51 m/s, peak transaortic gradient is 25.2 mmHg and mean transaortic gradient is 14.0 mmHg with an LVOT/aortic valve VTI ratio of 0.34. The effective orifice area is estimated at 1.37 cm² by the continuity equation and indexed at 0.59 cm²/m².   8. There is mild posterior calcification of the mitral valve annulus.    CT Head No Cont (.15.25 @ 12:22)   IMPRESSION: Stable moderate to large acute left parietal lobe hemorrhage   moderate adjacent vasogenic edema..    CT Head No Cont (06.12.25 @ 21:06)   Stable size of left parietal lobe hematoma and unchanged local mass   effect comparing to 2025.    MR Head w/wo IV Cont (06.12.25 @ 13:05)   IMPRESSION: Stable nonenhancing moderate size left occipital lobe   hematoma with small adjacent acute anterior infarcts.    CT Head No Cont (06.12.25 @ 21:06)   Stable size of left parietal lobe hematoma and unchanged local mass   effect comparing to 2025.    CT Head No Cont (06.11.25 @ 22:47)   Stable left posterior parietal-occipital paracentral intraparenchymal   hematoma    25  CT HEAD:  Left posterior parietal-occipital paracentral intraparenchymal hematoma measuring 3.4 x 3.8 x 3.5 cm with surrounding edema and mass effect on the splenium of the corpus callosum and atrium of the left lateral ventricle. Consider further evaluation via pre and postcontrast MR imaging of the brain, provided the patient has no contraindications.    CTA NECK:  1. No evidence of significant stenosis or occlusion.  2. Right lobe of the thyroid gland is prominent appearance measuring 8.4 x 4.8 x 5.1 cm in diameter. The thyroid isthmus is also prominent appearance measuring up to 3 cm in diameter. Several nodules are appreciated within the bilateral lobes of the thyroid gland. Findings include a 1.7 x 1.6 cm nodule with peripheral calcification within the region of the thyroid isthmus. Consider follow-up dedicated thyroid ultrasound assessment on a nonemergent basis for further characterization, as clinically indicated and provided the patient has no contraindications.    CTA HEAD:  1. No large vessel occlusion.  2. Mild stenosis in association with the distal intracranial bilateral vertebral arteries..  3. Hypoplastic left P1 segment with a fetal origin of the left posterior cerebral artery.  4. Left A1 segment is hypoplastic. Bilateral A2 segments appear to fill via the right A1 segment.  5.No aneurysm identified. Tiny aneurysms can be beyond the resolution of CTA technique. No discrete AVM appreciated.       [place] [facility]  [facility]   Office: [address] [place] [address]  Telephone: [phone] Fax:[phone]      HPI: 81yo male with PMH of obesity, BPH, Anemia s/p prior transfusions, HTN, HLD, CAD s/p CABG (; LIMA to LAD, SVG to OM, Diag, RPDA), persistent AF (on xarelto) s/p RF ablation (PVI, PW, CTI 2023 w/ Dr. Sherman), and known conduction disease (RBBB+ LAFB) who [place] [facility] [place] and new onset vision loss found to have an ICH. Pt was treated with Andexxa for Xarelto reversal. SBP also noted to be in 200s. EP consulted for Watchman evaluation.      EKG:  TELE:      Cardiologist: Dr. Mireles  EP: Dr. Sherman    Cardiology Summary  EC25: Sinus rhythm, RBBB, LAFB, PAC    Echo: 2021: Ejection fraction 45 to 50%, mild MR, mild aortic stenosis, moderately dilated left atrium, mild global LV systolic dysfunction, mild concentric LVH, mild right atrial enlargement, minimal TR, borderline elevated PASP at 37 mmHg.    Cardiac Cath/PCI: 3/26/2019: LIMA to LAD patent, SVG to RPDA and SVG to OM 2 patent. SVG to D1 likely occluded  2022: LIMA to LAD patent, SVG to RPDA and SVG to OM 2 patent. SVG to D1 likely occluded        PAST MEDICAL & SURGICAL HISTORY:  Afib   HTN (hypertension)  HLD (hyperlipidemia)  BPH with urinary obstruction  Obesity  Urolithiasis  S/P CABG x 4      REVIEW OF SYSTEMS:    CONSTITUTIONAL: No fever, weight loss, or fatigue  EYES: No visual disturbances  NECK: No pain or stiffness  RESPIRATORY: No cough, wheezing, chills or hemoptysis; No shortness of breath  CARDIOVASCULAR: see HPI  GASTROINTESTINAL: No abdominal or epigastric pain. No nausea, vomiting, or hematemesis; No diarrhea or constipation. No melena or hematochezia.  NEUROLOGICAL: No headaches, memory loss, loss of strength, numbness, or tremors  SKIN: No itching, burning, rashes, or lesions   LYMPH NODES: No enlarged glands  ENDOCRINE: No heat or cold intolerance; No hair loss  PSYCHIATRIC: No depression, anxiety, mood swings, or difficulty sleeping  HEME/LYMPH: No easy bruising, or bleeding gums      MEDICATIONS  (STANDING):  atorvastatin 40 milliGRAM(s) Oral at bedtime  carvedilol 6.25 milliGRAM(s) Oral every 12 hours  chlorhexidine 2% Cloths 1 Application(s) Topical daily  dextrose 50% Injectable 25 Gram(s) IV Push once  enoxaparin Injectable 40 milliGRAM(s) SubCutaneous every 24 hours  melatonin 5 milliGRAM(s) Oral at bedtime  oxybutynin XL 15 milliGRAM(s) Oral daily  pantoprazole    Tablet 40 milliGRAM(s) Oral before breakfast  polyethylene glycol 3350 17 Gram(s) Oral daily  sacubitril 24 mG/valsartan 26 mG 1 Tablet(s) Oral two times a day  senna 2 Tablet(s) Oral at bedtime  sodium chloride 0.9%. 1000 milliLiter(s) (50 mL/Hr) IV Continuous <Continuous>    MEDICATIONS  (PRN):  acetaminophen     Tablet .. 650 milliGRAM(s) Oral every 6 hours PRN Temp greater or equal to 38C (100.4F), Mild Pain (1 - 3)  hydrALAZINE Injectable 10 milliGRAM(s) IV Push every 2 hours PRN SBP>160  labetalol Injectable 10 milliGRAM(s) IV Push every 2 hours PRN SBP>160      Allergies  Biaxin (Rash; Urticaria; Nausea)      SOCIAL HISTORY:    FAMILY HISTORY:  FH: CVA (cerebrovascular accident) (Father)        Vital Signs Last 24 Hrs  T(C): 36.8 (2025 07:08), Max: 37.3 (15 Yazan 2025 19:22)  T(F): 98.3 (2025 07:08), Max: 99.2 (15 Yazan 2025 19:22)  HR: 88 (2025 10:00) (78 - 101)  BP: 130/89 (2025 10:00) (118/86 - 171/106)  BP(mean): 104 (2025 10:00) (91 - 126)  RR: 18 (2025 10:00) (15 - 19)  SpO2: 99% (2025 10:00) (94% - 100%)    Parameters below as of 2025 10:00  Patient On (Oxygen Delivery Method): room air        Physical Exam:  Constitutional: AAOx3, NAD  Neck: supple, No JVD  Cardiovascular: +S1S2 RRR, no murmurs, rubs, gallops   Pulmonary: CTA b/l, unlabored, no wheezes, rales. rhonci  Abdomen: +BS, soft NTND  Extremities: no edema b/l, +distal pulses b/l  Neuro: non focal, speech clear, MOREL x 4    LABS:                        14.8   7.11  )-----------( 200      ( 15 Yazan 2025 06:20 )             45.8   06-15    137  |  103  |  19.2  ----------------------------<  118[H]  4.1   |  19.0[L]  |  0.99    Ca    8.6      15 Yazan 2025 06:20  Phos  3.6     06-15  Mg     2.0     06-15          Urinalysis Basic - ( 15 Yazan 2025 06:20 )    Color: x / Appearance: x / SG: x / pH: x  Gluc: 118 mg/dL / Ketone: x  / Bili: x / Urobili: x   Blood: x / Protein: x / Nitrite: x   Leuk Esterase: x / RBC: x / WBC x   Sq Epi: x / Non Sq Epi: x / Bacteria: x        RADIOLOGY & ADDITIONAL STUDIES:  TTE 2025  CONCLUSIONS:      1. Left ventricular systolic function is normal with an ejection fraction visually estimated at 55 to 60 %. There are no regional wall motion abnormalities seen.   2. There is normal LV mass and normal geometry.   3. The left ventricular diastolic function is indeterminate. Analysis of left ventricular diastolic function and filling pressure is made challenging by the presence of frequent ectopic beats.   4. Normal right ventricular cavity size and borderline reduced right ventricular systolic function.   5. Left atrium is moderately dilated.   6. Trileaflet aortic valve with reduced systolic excursion. There is moderate calcification of the aortic valve leaflets. Moderate aortic stenosis.   7. The peak transaortic velocity is 2.51 m/s, peak transaortic gradient is 25.2 mmHg and mean transaortic gradient is 14.0 mmHg with an LVOT/aortic valve VTI ratio of 0.34. The effective orifice area is estimated at 1.37 cm² by the continuity equation and indexed at 0.59 cm²/m².   8. There is mild posterior calcification of the mitral valve annulus.    CT Head No Cont (15. @ 12:22)   IMPRESSION: Stable moderate to large acute left parietal lobe hemorrhage   moderate adjacent vasogenic edema..    CT Head No Cont (.. @ 21:06)   Stable size of left parietal lobe hematoma and unchanged local mass   effect comparing to 2025.    MR Head w/wo IV Cont (.. @ 13:05)   IMPRESSION: Stable nonenhancing moderate size left occipital lobe   hematoma with small adjacent acute anterior infarcts.    CT Head No Cont (..25 @ 21:06)   Stable size of left parietal lobe hematoma and unchanged local mass   effect comparing to 2025.    CT Head No Cont (25 @ 22:47)   Stable left posterior parietal-occipital paracentral intraparenchymal   hematoma    25  CT HEAD:  Left posterior parietal-occipital paracentral intraparenchymal hematoma measuring 3.4 x 3.8 x 3.5 cm with surrounding edema and mass effect on the splenium of the corpus callosum and atrium of the left lateral ventricle. Consider further evaluation via pre and postcontrast MR imaging of the brain, provided the patient has no contraindications.    CTA NECK:  1. No evidence of significant stenosis or occlusion.  2. Right lobe of the thyroid gland is prominent appearance measuring 8.4 x 4.8 x 5.1 cm in diameter. The thyroid isthmus is also prominent appearance measuring up to 3 cm in diameter. Several nodules are appreciated within the bilateral lobes of the thyroid gland. Findings include a 1.7 x 1.6 cm nodule with peripheral calcification within the region of the thyroid isthmus. Consider follow-up dedicated thyroid ultrasound assessment on a nonemergent basis for further characterization, as clinically indicated and provided the patient has no contraindications.    CTA HEAD:  1. No large vessel occlusion.  2. Mild stenosis in association with the distal intracranial bilateral vertebral arteries..  3. Hypoplastic left P1 segment with a fetal origin of the left posterior cerebral artery.  4. Left A1 segment is hypoplastic. Bilateral A2 segments appear to fill via the right A1 segment.  5.No aneurysm identified. Tiny aneurysms can be beyond the resolution of CTA technique. No discrete AVM appreciated.       [place] [facility]  [facility]   Office: [address] [place] [address]  Telephone: [phone] Fax:[phone]      HPI: 79yo male with PMH of obesity, BPH, Anemia s/p prior transfusions, HTN, HLD, CAD s/p CABG (; LIMA to LAD, SVG to OM, Diag, RPDA), persistent AF (on xarelto) s/p RF ablation (PVI, PW, CTI 2023 w/ Dr. Sherman), and known conduction disease (RBBB+ LAFB) who presented to [facility] with confusion and new onset vision loss found to have an ICH. Pt was treated with Andexxa for Xarelto reversal. SBP also noted to be in 200s. EP consulted for Watchman evaluation.    Pt seen with family at bedside. Pt with known AF and prior AF ablation with Dr. Sherman in 2023. He has been following with Dr. Mireles and has been doing well since his ablation without any reoccurrence of AF.       EKG: SR w/ APCs, bifascicular block (RBBB + LAFB)  TELE: SR, APCs, PVCs, NSVT and salvos of AT      Cardiologist: Dr. Mireles  EP: Dr. Sherman    Cardiology Summary  EC25: Sinus rhythm, RBBB, LAFB, PAC    Echo: 2021: Ejection fraction 45 to 50%, mild MR, mild aortic stenosis, moderately dilated left atrium, mild global LV systolic dysfunction, mild concentric LVH, mild right atrial enlargement, minimal TR, borderline elevated PASP at 37 mmHg.    Cardiac Cath/PCI: 3/26/2019: LIMA to LAD patent, SVG to RPDA and SVG to OM 2 patent. SVG to D1 likely occluded  2022: LIMA to LAD patent, SVG to RPDA and SVG to OM 2 patent. SVG to D1 likely occluded        PAST MEDICAL & SURGICAL HISTORY:  Afib   HTN (hypertension)  HLD (hyperlipidemia)  BPH with urinary obstruction  Obesity  Urolithiasis  S/P CABG x 4      REVIEW OF SYSTEMS:    CONSTITUTIONAL: No fever, weight loss, or fatigue  EYES: No visual disturbances  NECK: No pain or stiffness  RESPIRATORY: No cough, wheezing, chills or hemoptysis; No shortness of breath  CARDIOVASCULAR: see HPI  GASTROINTESTINAL: No abdominal or epigastric pain. No nausea, vomiting, or hematemesis; No diarrhea or constipation. No melena or hematochezia.  NEUROLOGICAL: No headaches, memory loss, loss of strength, numbness, or tremors  SKIN: No itching, burning, rashes, or lesions   LYMPH NODES: No enlarged glands  ENDOCRINE: No heat or cold intolerance; No hair loss  PSYCHIATRIC: No depression, anxiety, mood swings, or difficulty sleeping  HEME/LYMPH: No easy bruising, or bleeding gums      MEDICATIONS  (STANDING):  atorvastatin 40 milliGRAM(s) Oral at bedtime  carvedilol 6.25 milliGRAM(s) Oral every 12 hours  chlorhexidine 2% Cloths 1 Application(s) Topical daily  dextrose 50% Injectable 25 Gram(s) IV Push once  enoxaparin Injectable 40 milliGRAM(s) SubCutaneous every 24 hours  melatonin 5 milliGRAM(s) Oral at bedtime  oxybutynin XL 15 milliGRAM(s) Oral daily  pantoprazole    Tablet 40 milliGRAM(s) Oral before breakfast  polyethylene glycol 3350 17 Gram(s) Oral daily  sacubitril 24 mG/valsartan 26 mG 1 Tablet(s) Oral two times a day  senna 2 Tablet(s) Oral at bedtime  sodium chloride 0.9%. 1000 milliLiter(s) (50 mL/Hr) IV Continuous <Continuous>    MEDICATIONS  (PRN):  acetaminophen     Tablet .. 650 milliGRAM(s) Oral every 6 hours PRN Temp greater or equal to 38C (100.4F), Mild Pain (1 - 3)  hydrALAZINE Injectable 10 milliGRAM(s) IV Push every 2 hours PRN SBP>160  labetalol Injectable 10 milliGRAM(s) IV Push every 2 hours PRN SBP>160      Allergies  Biaxin (Rash; Urticaria; Nausea)      SOCIAL HISTORY:    FAMILY HISTORY:  FH: CVA (cerebrovascular accident) (Father)        Vital Signs Last 24 Hrs  T(C): 36.8 (2025 07:08), Max: 37.3 (15 Yazan 2025 19:22)  T(F): 98.3 (2025 07:08), Max: 99.2 (15 Yazan 2025 19:22)  HR: 88 (2025 10:00) (78 - 101)  BP: 130/89 (2025 10:00) (118/86 - 171/106)  BP(mean): 104 (2025 10:00) (91 - 126)  RR: 18 (2025 10:00) (15 - 19)  SpO2: 99% (2025 10:00) (94% - 100%)    Parameters below as of 2025 10:00  Patient On (Oxygen Delivery Method): room air        Physical Exam:  Constitutional: AAOx3, NAD  Neck: supple, No JVD  Cardiovascular: +S1S2 RRR, no murmurs, rubs, gallops   Pulmonary: CTA b/l, unlabored, no wheezes, rales. rhonci  Abdomen: +BS, soft NTND  Extremities: no edema b/l, +distal pulses b/l  Neuro: non focal, speech clear, MOREL x 4    LABS:                        14.8   7.11  )-----------( 200      ( 15 Yazan 2025 06:20 )             45.8   06-15    137  |  103  |  19.2  ----------------------------<  118[H]  4.1   |  19.0[L]  |  0.99    Ca    8.6      15 Yazan 2025 06:20  Phos  3.6     06-15  Mg     2.0     06-15          Urinalysis Basic - ( 15 Yazan 2025 06:20 )    Color: x / Appearance: x / SG: x / pH: x  Gluc: 118 mg/dL / Ketone: x  / Bili: x / Urobili: x   Blood: x / Protein: x / Nitrite: x   Leuk Esterase: x / RBC: x / WBC x   Sq Epi: x / Non Sq Epi: x / Bacteria: x        RADIOLOGY & ADDITIONAL STUDIES:  TTE 2025  CONCLUSIONS:      1. Left ventricular systolic function is normal with an ejection fraction visually estimated at 55 to 60 %. There are no regional wall motion abnormalities seen.   2. There is normal LV mass and normal geometry.   3. The left ventricular diastolic function is indeterminate. Analysis of left ventricular diastolic function and filling pressure is made challenging by the presence of frequent ectopic beats.   4. Normal right ventricular cavity size and borderline reduced right ventricular systolic function.   5. Left atrium is moderately dilated.   6. Trileaflet aortic valve with reduced systolic excursion. There is moderate calcification of the aortic valve leaflets. Moderate aortic stenosis.   7. The peak transaortic velocity is 2.51 m/s, peak transaortic gradient is 25.2 mmHg and mean transaortic gradient is 14.0 mmHg with an LVOT/aortic valve VTI ratio of 0.34. The effective orifice area is estimated at 1.37 cm² by the continuity equation and indexed at 0.59 cm²/m².   8. There is mild posterior calcification of the mitral valve annulus.    CT Head No Cont (06.15.25 @ 12:22)   IMPRESSION: Stable moderate to large acute left parietal lobe hemorrhage   moderate adjacent vasogenic edema..    CT Head No Cont (06.12.25 @ 21:06)   Stable size of left parietal lobe hematoma and unchanged local mass   effect comparing to 2025.    MR Head w/wo IV Cont (.12.25 @ 13:05)   IMPRESSION: Stable nonenhancing moderate size left occipital lobe   hematoma with small adjacent acute anterior infarcts.    CT Head No Cont (06.12.25 @ 21:06)   Stable size of left parietal lobe hematoma and unchanged local mass   effect comparing to 2025.    CT Head No Cont (06.11.25 @ 22:47)   Stable left posterior parietal-occipital paracentral intraparenchymal   hematoma    25  CT HEAD:  Left posterior parietal-occipital paracentral intraparenchymal hematoma measuring 3.4 x 3.8 x 3.5 cm with surrounding edema and mass effect on the splenium of the corpus callosum and atrium of the left lateral ventricle. Consider further evaluation via pre and postcontrast MR imaging of the brain, provided the patient has no contraindications.    CTA NECK:  1. No evidence of significant stenosis or occlusion.  2. Right lobe of the thyroid gland is prominent appearance measuring 8.4 x 4.8 x 5.1 cm in diameter. The thyroid isthmus is also prominent appearance measuring up to 3 cm in diameter. Several nodules are appreciated within the bilateral lobes of the thyroid gland. Findings include a 1.7 x 1.6 cm nodule with peripheral calcification within the region of the thyroid isthmus. Consider follow-up dedicated thyroid ultrasound assessment on a nonemergent basis for further characterization, as clinically indicated and provided the patient has no contraindications.    CTA HEAD:  1. No large vessel occlusion.  2. Mild stenosis in association with the distal intracranial bilateral vertebral arteries..  3. Hypoplastic left P1 segment with a fetal origin of the left posterior cerebral artery.  4. Left A1 segment is hypoplastic. Bilateral A2 segments appear to fill via the right A1 segment.  5.No aneurysm identified. Tiny aneurysms can be beyond the resolution of CTA technique. No discrete AVM appreciated.

## 2025-06-16 NOTE — CONSULT NOTE ADULT - NS ATTEND AMEND GEN_ALL_CORE FT
pt presenting with acute intracranial bleed, and anticoagulation stopped/reversed.   He had a history of persistent AF and CMP, and underwent AF Ablation in 2013, and has remained in sinus rhythm on followup and had recovery of LV function. Given elevated risk profile he had remained on anticoagulation with Xarelto. He reports he had not been taking ASA.     Now remains in sinus rhythm on telemetry, and recovering from acute ICH. He is now high risk for recurrent bleeding complications exacerbated by anticoagulation.   He is a good candidate for left atrial appendage occlusion to reduce risk of recurrent stroke/thromboembolism and avoid long-term anticoagulation- the risks and benefits of this procedure, including procedure related risks were reviewed with the patient and his wife.  He does want to proceed. However, currently not a good candidate for intra-procedural anticoagulation and post procedure regimen (which would likely be DAPT x 3 months).   After discussion with neurology, will need to wait at least 30 days for this, and they will repeat HCT at that time prior to clearing pt to proceed with LAAO.   Will anticipate LAAO procedure in about 2-3 months from now. After this, will likely do DAPT x3 mo, then ASA alone.   Given lack of recurrent AF and high bleeding risks, would be reasonable to remain off anticoagulation for the next couple of months, until LAAO can be performed. would restart ASA if cleared to do so by neurology/nrsg. pt presenting with acute intracranial bleed, and anticoagulation stopped/reversed.   He had a history of persistent AF and CMP, and underwent AF Ablation in 2013, and has remained in sinus rhythm on followup and had recovery of LV function. Given elevated risk profile he had remained on anticoagulation with Xarelto. He reports he had not been taking ASA.     Now remains in sinus rhythm on telemetry, and recovering from acute ICH. He is now high risk for recurrent bleeding complications exacerbated by anticoagulation.   He is a good candidate for left atrial appendage occlusion to reduce risk of recurrent stroke/thromboembolism and avoid long-term anticoagulation- the risks and benefits of this procedure, including procedure related risks were reviewed with the patient and his wife.  He does want to proceed. However, currently not a good candidate for intra-procedural anticoagulation and post procedure regimen (which would likely be DAPT x 3 months).   After discussion with neurology, will need to wait at least 30 days for this, and they will repeat HCT at that time prior to clearing pt to proceed with LAAO.   Will anticipate LAAO procedure in about 2-3 months from now. After this, will likely do DAPT x3 mo, then ASA alone.   Given lack of recurrent AF and high bleeding risks, would be reasonable to remain off anticoagulation for the next couple of months, until LAAO can be performed. would restart ASA if cleared to do so by neurology/nrsg.  Will get cardiac CT prior to LAAO procedure- can be done as inpatient if possible before discharge

## 2025-06-16 NOTE — SPEECH LANGUAGE PATHOLOGY EVALUATION - COMMENTS
81yo M with PMHx GERD, HTN, BPH, CAD on baby aspirin, s/p CABG 2012, afib on xarelto s/p ablation, [place] [facility] [place] and [place] onset vision loss. Per patient's wife, Pt returned from a doctor's appointment afternoon in his usual state of health, last known well 12pm 6/11/25. A few hours after around 4pm, Pt started experiencing dizziness, memory loss, forgetfulness, and vision loss. Pt was walking in the wrong rooms and placing items in incorrect places.  CT head with left parietal-occipital IPH, CT angiogram head/neck with multifocal intracranial atherosclerotic disease. MR brain demonstrated stable non enhancing moderate left occipital evangelist IPH with adjacent infarctions.     Impression: Left posterior-occipital IPH. Post hemorrhagic acute adjacent infarcts possibly sequela of post hemorrhagic event, though can not entirely exclude embolism. Etiology concerning for hypertension with underlying coagulopathy vs CAA. Follow up to resolution to exclude hemorrhagic transformation of ischemic infarction in the setting of hypertension. reduced insight into deficits Pt w/difficulty on memory tasks with report he feels he can currently return to work, would consider as safety risk. Pt would benefit from supervision following d/c

## 2025-06-16 NOTE — SPEECH LANGUAGE PATHOLOGY EVALUATION - SLP GENERAL OBSERVATIONS
Recd awake/upright OOB to chair, A&A Ox2, reduced cognition, 0/10 pain pre/post, tolerating RA NAD, wife present for encounter

## 2025-06-16 NOTE — DISCHARGE NOTE PROVIDER - HOSPITAL COURSE
81yo M with PMHx GERD, HTN, BPH, CAD on baby aspirin, s/p CABG 2012, afib on xarelto s/p ablation, [place] [facility] [place] with confusion and new onset vision loss. Per patient's wife, he returned from a doctor's appointment afternoon in his usual state of health, last known well 12pm 6/11/25. A few hours after around 4pm, he started experiencing dizziness, forgetfulness, and vision loss. Pt was walking in the wrong rooms and placing items in incorrect places. CT head with left parietal-occipital IPH, CT angiogram head/neck with multifocal intracranial atherosclerotic disease. MR brain demonstrated stable non enhancing moderate left occipital evangelist IPH with adjacent infarctions.     Impression: Left posterior-occipital IPH. Post hemorrhagic acute adjacent infarcts possibly sequela of post hemorrhagic event, though can not entirely exclude embolism. Etiology concerning for hypertension with underlying coagulopathy vs CAA. Follow up to resolution to exclude hemorrhagic transformation of ischemic infarction in the setting of hypertension.     Workup:    NEURO:   -Blood pressure goal: normotension as tolerated avoiding rapid fluctuations and hypotension  -ANTITHROMBOTIC THERAPY: AC/antithrombotic on hold in setting of ICH, further timeline for initiation based on clinical course and serial imaging in coordination with nsx team. Patient seen by EP cardiology. He will have outpatient evaluation with Dr. Sherman for consideration of Watchman device. Recommend CT heart/CORBIN. Will repeat CTH in two weeks at which point we will consider adding aspirin 81 mg daily.   -LDL 65 - continues on atorvastatin 40 mg daily  -Hga1c 5.7  -Dysphagia screen: passed  -DVT ppx: Heparin s.c [] LMWH [x] SCD[x]      -CARDIOVASCULAR: - TTE as noted, cardiac monitoring w/ telemetry for now, noted 11 beats asx v tach overnights, electrolytes within range. Cardiology consulted. Patient continues on entresto 24 mg/26 mg and carvedilol 6.25 mg BID. Toprol discontinued.                  -HEMATOLOGY: H/H without anemia , Platelets 200, patient should have all age and risk appropriate malignancy screenings with PCP or sooner if clinically suspected   -LE duplex negative for DVT      DVT ppx: Heparin s.c [x] LMWH [] SCD[x]     PULMONARY:  protecting airway, saturating well     RENAL: BUN/Cr within limits , monitor urine output, maintain adequate hydration       Na Goal:  135-145     Love: N    ID: Febrile 6/13-6/14. No leukocytosis. CXR with possible left opacity.  -ID consulted. Appreciate recs. He is asymptomatic from a respiratory perspective  -RVP on 6/13 was negative  -6/14 BCx ngtd, MRSA nasal swab negative   -Monitored off antibiotics     OTHER:  condition and plan of care d/w patient and wife at bedside. questions and concerns addressed.     DISPOSITION: Acute Rehab once stable and workup is complete      CORE MEASURES     Admission NIHSS: 7     Tenecteplase : [] YES [x] NO     LDL/A1C: as noted      Depression Screen- if depression hx and/or present     Statin Therapy: as noted      Dysphagia Screen: [x] PASS [] FAIL     Smoking in the past 12 months [] YES [x] NO       Afib [x] YES [] NO     Stroke Education [x] YES [] NO     Diabetes [] YES [x] NO 81yo M with PMHx GERD, HTN, BPH, CAD on baby aspirin, s/p CABG 2012, afib on xarelto s/p ablation, [place] [facility] [place] with confusion and new onset vision loss. Per patient's wife, he returned from a doctor's appointment afternoon in his usual state of health, last known well 12pm 6/11/25. A few hours after around 4pm, he started experiencing dizziness, forgetfulness, and vision loss. Pt was walking in the wrong rooms and placing items in incorrect places. CT head with left parietal-occipital IPH, CT angiogram head/neck with multifocal intracranial atherosclerotic disease. MR brain demonstrated stable non enhancing moderate left occipital evangelist IPH with adjacent infarctions.     Impression: Left posterior-occipital IPH. Post hemorrhagic acute adjacent infarcts possibly sequela of post hemorrhagic event, though can not entirely exclude embolism. Etiology concerning for hypertension with underlying coagulopathy vs CAA. Follow up to resolution to exclude hemorrhagic transformation of ischemic infarction in the setting of hypertension.     Workup:  CT Head No Cont (06.11.25 @ 22:47) IMPRESSION: Stable left posterior parietal-occipital paracentral intraparenchymal hematoma  MR Head w/wo IV Cont (06.12.25 @ 13:05) IMPRESSION: Stable nonenhancing moderate size left occipital lobe hematoma with small adjacent acute anterior infarcts.  CT Head No Cont (06.12.25 @ 21:06) IMPRESSION: Stable size of left parietal lobe hematoma and unchanged local mass effect comparing to 6/11/2025.  US Duplex Venous Lower Ext Complete, Bilateral (06.14.25 @ 12:04) IMPRESSION: No evidence of deep venous thrombosis in either lower extremity.  CT Head No Cont (06.15.25 @ 12:22) IMPRESSION: Stable moderate to large acute left parietal lobe hemorrhage moderate adjacent vasogenic edema.  TTE W or WO Ultrasound Enhancing Agent (06.13.25 @ 17:14) 1. Left ventricular systolic function is normal with an ejection fraction visually estimated at 55 to 60 %. There are no regional wall motion abnormalities seen. 2. There is normal LV mass and normal geometry. 3. The left ventricular diastolic function is indeterminate. Analysis of left ventricular diastolic function and filling pressure is made challenging by the presence of frequent ectopic beats. 4. Normal right ventricular cavity size and borderline reduced right ventricular systolic function. 5. Left atrium is moderately dilated. 6. Trileaflet aortic valve with reduced systolic excursion. There is moderate calcification of the aortic valve leaflets. Moderate aortic stenosis. 7. The peak transaortic velocity is 2.51 m/s, peak transaortic gradient is 25.2 mmHg and mean transaortic gradient is 14.0 mmHg with an LVOT/aortic valve VTI ratio of 0.34. The effective orifice area is estimated at 1.37 cm² by the continuity equation and indexed at 0.59 cm²/m². 8. There is mild posterior calcification of the mitral valve annulus.  CTA NECK: 1. No evidence of significant stenosis or occlusion. 2. Right lobe of the thyroid gland is prominent appearance measuring 8.4 x 4.8 x 5.1 cm in diameter. The thyroid isthmus is also prominent appearance measuring up to 3 cm in diameter. Several nodules are appreciated within the bilateral lobes of the thyroid gland. Findings include a 1.7 x 1.6 cm nodule with peripheral calcification within the region of the thyroid isthmus. Consider follow-up dedicated thyroid ultrasound assessment on a nonemergent basis for further characterization, as clinically indicated and provided the patient has no contraindications.  CTA HEAD: 1. No large vessel occlusion. 2. Mild stenosis in association with the distal intracranial bilateral vertebral arteries. 3. Hypoplastic left P1 segment with a fetal origin of the left posterior cerebral artery. 4. Left A1 segment is hypoplastic. Bilateral A2 segments appear to fill via the right A1 segment. 5.No aneurysm identified. Tiny aneurysms can be beyond the resolution of CTA technique. No discrete AVM appreciated.    NEURO:   -Blood pressure goal: normotension as tolerated avoiding rapid fluctuations and hypotension  -ANTITHROMBOTIC THERAPY: AC/antithrombotic on hold in setting of ICH, further timeline for initiation based on clinical course and serial imaging in coordination with nsx team. Patient seen by EP cardiology. He will have outpatient evaluation with Dr. Sherman for consideration of Watchman device. Recommend CT heart/CORBIN. Will repeat CTH in two weeks at which point we will consider adding aspirin 81 mg daily. Discussed plan with cardiology. Pt is currently not in active afib and can hold off on adding AC for now.  -LDL 65 - continues on atorvastatin 40 mg daily  -Hga1c 5.7  -Dysphagia screen: passed  -DVT ppx: Heparin s.c [] LMWH [x] SCD[x]      -CARDIOVASCULAR: - TTE as noted, cardiac monitoring w/ telemetry for now, noted 11 beats asx v tach overnights, electrolytes within range. Cardiology consulted. Patient continues on entresto 24 mg/26 mg and carvedilol 6.25 mg BID. Toprol discontinued.                  -HEMATOLOGY: H/H without anemia , Platelets 200, patient should have all age and risk appropriate malignancy screenings with PCP or sooner if clinically suspected   -LE duplex negative for DVT      DVT ppx: Heparin s.c [x] LMWH [] SCD[x]     PULMONARY:  protecting airway, saturating well     RENAL: BUN/Cr within limits , monitor urine output, maintain adequate hydration       Na Goal:  135-145     Love: N    ID: Febrile 6/13-6/14. No leukocytosis. CXR with possible left opacity.  -ID consulted. Appreciate recs. He is asymptomatic from a respiratory perspective  -RVP on 6/13 was negative  -6/14 BCx ngtd, MRSA nasal swab negative   -Monitored off antibiotics     OTHER: Follow up outpatient with PCP for incidentally seen thryoid nodules and consideration of dedicated ultrasound. Condition and plan of care d/w patient and wife at bedside. questions and concerns addressed.     DISPOSITION: Acute Rehab     CORE MEASURES     Admission NIHSS: 7     Tenecteplase : [] YES [x] NO     LDL/A1C: as noted      Depression Screen- if depression hx and/or present     Statin Therapy: as noted      Dysphagia Screen: [x] PASS [] FAIL     Smoking in the past 12 months [] YES [x] NO       Afib [x] YES [] NO     Stroke Education [x] YES [] NO     Diabetes [] YES [x] NO

## 2025-06-16 NOTE — DISCHARGE NOTE PROVIDER - CARE PROVIDERS DIRECT ADDRESSES
[email or web address]

## 2025-06-16 NOTE — DISCHARGE NOTE PROVIDER - CARE PROVIDER_API CALL
Peggy Cervantes  Neurology  [address] [place] [address]  Phone: [phone]  Fax: [phone]  Follow Up Time: 1 month    Grant Sherman  Cardiac Electrophysiology  [address], [address]  [place] [address]  Phone: [phone]  Fax: [phone]  Follow Up Time: 1 month

## 2025-06-16 NOTE — SPEECH LANGUAGE PATHOLOGY EVALUATION - SLP DIAGNOSIS
Primary language functional for general information/tasks. At least mild cognitive dysfunction with reduced immediate & short term recall, decreased sequencing & higher level temporal manipulation. Motor speech intact.

## 2025-06-19 LAB
CULTURE RESULTS: SIGNIFICANT CHANGE UP
CULTURE RESULTS: SIGNIFICANT CHANGE UP
SPECIMEN SOURCE: SIGNIFICANT CHANGE UP
SPECIMEN SOURCE: SIGNIFICANT CHANGE UP

## 2025-06-30 ENCOUNTER — APPOINTMENT (OUTPATIENT)
Dept: CARDIOLOGY | Facility: CLINIC | Age: 80
End: 2025-06-30
Payer: MEDICARE

## 2025-06-30 ENCOUNTER — NON-APPOINTMENT (OUTPATIENT)
Age: 80
End: 2025-06-30

## 2025-06-30 PROCEDURE — 99213 OFFICE O/P EST LOW 20 MIN: CPT | Mod: 93

## 2025-06-30 RX ORDER — OXYBUTYNIN CHLORIDE 5 MG/1
5 TABLET ORAL 3 TIMES DAILY
Refills: 0 | Status: ACTIVE | COMMUNITY

## 2025-07-01 PROBLEM — Z01.818 PRE-OP TESTING: Status: RESOLVED | Noted: 2020-05-29 | Resolved: 2025-07-01

## 2025-07-01 RX ORDER — CARVEDILOL 12.5 MG/1
12.5 TABLET, FILM COATED ORAL TWICE DAILY
Qty: 180 | Refills: 3 | Status: ACTIVE | COMMUNITY
Start: 1900-01-01 | End: 1900-01-01

## 2025-07-02 ENCOUNTER — APPOINTMENT (OUTPATIENT)
Dept: CARDIOLOGY | Facility: CLINIC | Age: 80
End: 2025-07-02

## 2025-07-07 ENCOUNTER — NON-APPOINTMENT (OUTPATIENT)
Age: 80
End: 2025-07-07

## 2025-07-15 PROBLEM — I61.9 CEREBRAL HEMORRHAGE: Status: ACTIVE | Noted: 2025-07-15

## 2025-07-18 ENCOUNTER — APPOINTMENT (OUTPATIENT)
Dept: CT IMAGING | Facility: CLINIC | Age: 80
End: 2025-07-18
Payer: MEDICARE

## 2025-07-18 PROCEDURE — 70450 CT HEAD/BRAIN W/O DYE: CPT

## 2025-07-21 ENCOUNTER — NON-APPOINTMENT (OUTPATIENT)
Age: 80
End: 2025-07-21

## 2025-07-22 ENCOUNTER — TRANSCRIPTION ENCOUNTER (OUTPATIENT)
Age: 80
End: 2025-07-22

## 2025-07-22 ENCOUNTER — NON-APPOINTMENT (OUTPATIENT)
Age: 80
End: 2025-07-22

## 2025-07-28 ENCOUNTER — NON-APPOINTMENT (OUTPATIENT)
Age: 80
End: 2025-07-28

## 2025-07-28 ENCOUNTER — APPOINTMENT (OUTPATIENT)
Dept: NEUROLOGY | Facility: CLINIC | Age: 80
End: 2025-07-28
Payer: MEDICARE

## 2025-07-28 VITALS
BODY MASS INDEX: 28.63 KG/M2 | HEIGHT: 73 IN | SYSTOLIC BLOOD PRESSURE: 118 MMHG | HEART RATE: 80 BPM | DIASTOLIC BLOOD PRESSURE: 78 MMHG | WEIGHT: 216 LBS | OXYGEN SATURATION: 97 %

## 2025-07-28 DIAGNOSIS — I61.1 NONTRAUMATIC INTRACEREBRAL HEMORRHAGE IN HEMISPHERE, CORTICAL: ICD-10-CM

## 2025-07-28 PROCEDURE — 99215 OFFICE O/P EST HI 40 MIN: CPT

## 2025-07-28 PROCEDURE — G2211 COMPLEX E/M VISIT ADD ON: CPT

## 2025-08-04 ENCOUNTER — APPOINTMENT (OUTPATIENT)
Dept: CARDIOLOGY | Facility: CLINIC | Age: 80
End: 2025-08-04
Payer: MEDICARE

## 2025-08-04 VITALS
DIASTOLIC BLOOD PRESSURE: 82 MMHG | BODY MASS INDEX: 28.49 KG/M2 | OXYGEN SATURATION: 99 % | SYSTOLIC BLOOD PRESSURE: 144 MMHG | WEIGHT: 215 LBS | HEIGHT: 73 IN | HEART RATE: 67 BPM

## 2025-08-04 DIAGNOSIS — I25.10 ATHEROSCLEROTIC HEART DISEASE OF NATIVE CORONARY ARTERY W/OUT ANGINA PECTORIS: ICD-10-CM

## 2025-08-04 DIAGNOSIS — I48.19 OTHER PERSISTENT ATRIAL FIBRILLATION: ICD-10-CM

## 2025-08-04 DIAGNOSIS — I10 ESSENTIAL (PRIMARY) HYPERTENSION: ICD-10-CM

## 2025-08-04 DIAGNOSIS — R93.1 ABNORMAL FINDINGS ON DIAGNOSTIC IMAGING OF HEART AND CORONARY CIRCULATION: ICD-10-CM

## 2025-08-04 DIAGNOSIS — E78.00 PURE HYPERCHOLESTEROLEMIA, UNSPECIFIED: ICD-10-CM

## 2025-08-04 DIAGNOSIS — I50.22 CHRONIC SYSTOLIC (CONGESTIVE) HEART FAILURE: ICD-10-CM

## 2025-08-04 PROCEDURE — 99214 OFFICE O/P EST MOD 30 MIN: CPT

## 2025-08-04 PROCEDURE — G2211 COMPLEX E/M VISIT ADD ON: CPT

## 2025-08-08 ENCOUNTER — APPOINTMENT (OUTPATIENT)
Dept: MRI IMAGING | Facility: CLINIC | Age: 80
End: 2025-08-08

## 2025-08-08 PROCEDURE — A9585: CPT

## 2025-08-08 PROCEDURE — 70544 MR ANGIOGRAPHY HEAD W/O DYE: CPT | Mod: XU

## 2025-08-08 PROCEDURE — 70553 MRI BRAIN STEM W/O & W/DYE: CPT

## 2025-08-15 ENCOUNTER — NON-APPOINTMENT (OUTPATIENT)
Age: 80
End: 2025-08-15

## 2025-08-19 RX ORDER — SACUBITRIL AND VALSARTAN 97; 103 MG/1; MG/1
97-103 TABLET, FILM COATED ORAL TWICE DAILY
Qty: 180 | Refills: 1 | Status: ACTIVE | COMMUNITY
Start: 2025-08-19 | End: 1900-01-01

## 2025-08-29 DIAGNOSIS — E04.1 NONTOXIC SINGLE THYROID NODULE: ICD-10-CM

## 2025-09-09 ENCOUNTER — APPOINTMENT (OUTPATIENT)
Dept: ULTRASOUND IMAGING | Facility: CLINIC | Age: 80
End: 2025-09-09
Payer: MEDICARE

## 2025-09-09 PROCEDURE — 76536 US EXAM OF HEAD AND NECK: CPT

## 2025-09-10 ENCOUNTER — APPOINTMENT (OUTPATIENT)
Dept: NEUROLOGY | Facility: CLINIC | Age: 80
End: 2025-09-10

## 2025-09-10 VITALS
WEIGHT: 215 LBS | HEIGHT: 73 IN | HEART RATE: 68 BPM | SYSTOLIC BLOOD PRESSURE: 162 MMHG | DIASTOLIC BLOOD PRESSURE: 102 MMHG | OXYGEN SATURATION: 95 % | BODY MASS INDEX: 28.49 KG/M2

## 2025-09-10 DIAGNOSIS — S06.9XAS OTHER SYMPTOMS AND SIGNS INVOLVING COGNITIVE FUNCTIONS AND AWARENESS: ICD-10-CM

## 2025-09-10 DIAGNOSIS — R41.89 OTHER SYMPTOMS AND SIGNS INVOLVING COGNITIVE FUNCTIONS AND AWARENESS: ICD-10-CM

## 2025-09-10 DIAGNOSIS — I69.919 UNSPECIFIED SYMPTOMS AND SIGNS INVOLVING COGNITIVE FUNCTIONS FOLLOWING UNSPECIFIED CEREBROVASCULAR DISEASE: ICD-10-CM

## 2025-09-10 DIAGNOSIS — R26.9 UNSPECIFIED ABNORMALITIES OF GAIT AND MOBILITY: ICD-10-CM

## 2025-09-10 RX ORDER — SERTRALINE HYDROCHLORIDE 100 MG/1
100 TABLET, FILM COATED ORAL
Refills: 0 | Status: ACTIVE | COMMUNITY

## 2025-09-10 RX ORDER — TAMSULOSIN HYDROCHLORIDE 0.4 MG/1
0.4 CAPSULE ORAL
Refills: 0 | Status: ACTIVE | COMMUNITY

## 2025-09-16 RX ORDER — METOPROLOL SUCCINATE 50 MG/1
50 TABLET, EXTENDED RELEASE ORAL
Qty: 180 | Refills: 3 | Status: ACTIVE | COMMUNITY
Start: 1900-01-01 | End: 1900-01-01